# Patient Record
Sex: FEMALE | Race: WHITE | NOT HISPANIC OR LATINO | Employment: OTHER | URBAN - METROPOLITAN AREA
[De-identification: names, ages, dates, MRNs, and addresses within clinical notes are randomized per-mention and may not be internally consistent; named-entity substitution may affect disease eponyms.]

---

## 2017-04-20 ENCOUNTER — HISTORICAL (OUTPATIENT)
Dept: RADIOLOGY | Facility: HOSPITAL | Age: 82
End: 2017-04-20

## 2017-08-10 LAB
ABS NEUT (OLG): 2.67 X10(3)/MCL (ref 2.1–9.2)
ALBUMIN SERPL-MCNC: 4.3 GM/DL (ref 3.4–5)
ALBUMIN/GLOB SERPL: 1.5 {RATIO}
ALP SERPL-CCNC: 92 UNIT/L (ref 38–126)
ALT SERPL-CCNC: 21 UNIT/L (ref 12–78)
AST SERPL-CCNC: 16 UNIT/L (ref 15–37)
BASOPHILS # BLD AUTO: 0 X10(3)/MCL (ref 0–0.2)
BASOPHILS NFR BLD AUTO: 0 %
BILIRUB SERPL-MCNC: 0.5 MG/DL (ref 0.2–1)
BILIRUBIN DIRECT+TOT PNL SERPL-MCNC: 0.1 MG/DL (ref 0–0.2)
BILIRUBIN DIRECT+TOT PNL SERPL-MCNC: 0.4 MG/DL (ref 0–0.8)
BUN SERPL-MCNC: 24 MG/DL (ref 7–18)
CALCIUM SERPL-MCNC: 9.1 MG/DL (ref 8.5–10.1)
CHLORIDE SERPL-SCNC: 103 MMOL/L (ref 98–107)
CHOLEST SERPL-MCNC: 173 MG/DL (ref 0–200)
CHOLEST/HDLC SERPL: 5.4 {RATIO} (ref 0–4)
CO2 SERPL-SCNC: 29 MMOL/L (ref 21–32)
CREAT SERPL-MCNC: 1.02 MG/DL (ref 0.55–1.02)
DEPRECATED CALCIDIOL+CALCIFEROL SERPL-MC: 28.86 NG/ML (ref 30–80)
EOSINOPHIL # BLD AUTO: 0.2 X10(3)/MCL (ref 0–0.9)
EOSINOPHIL NFR BLD AUTO: 3 %
ERYTHROCYTE [DISTWIDTH] IN BLOOD BY AUTOMATED COUNT: 14.3 % (ref 11.5–17)
GLOBULIN SER-MCNC: 2.9 GM/DL (ref 2.4–3.5)
GLUCOSE SERPL-MCNC: 89 MG/DL (ref 74–106)
HCT VFR BLD AUTO: 41 % (ref 37–47)
HDLC SERPL-MCNC: 32 MG/DL (ref 35–60)
HGB BLD-MCNC: 12.8 GM/DL (ref 12–16)
LDLC SERPL CALC-MCNC: 20 MG/DL (ref 0–129)
LYMPHOCYTES # BLD AUTO: 2.6 X10(3)/MCL (ref 0.6–4.6)
LYMPHOCYTES NFR BLD AUTO: 44 %
MCH RBC QN AUTO: 31.4 PG (ref 27–31)
MCHC RBC AUTO-ENTMCNC: 31.2 GM/DL (ref 33–36)
MCV RBC AUTO: 100.5 FL (ref 80–94)
MONOCYTES # BLD AUTO: 0.5 X10(3)/MCL (ref 0.1–1.3)
MONOCYTES NFR BLD AUTO: 8 %
NEUTROPHILS # BLD AUTO: 2.67 X10(3)/MCL (ref 2.1–9.2)
NEUTROPHILS NFR BLD AUTO: 45 %
PLATELET # BLD AUTO: 182 X10(3)/MCL (ref 130–400)
PMV BLD AUTO: 12.8 FL (ref 9.4–12.4)
POTASSIUM SERPL-SCNC: 4.7 MMOL/L (ref 3.5–5.1)
PROT SERPL-MCNC: 7.2 GM/DL (ref 6.4–8.2)
RBC # BLD AUTO: 4.08 X10(6)/MCL (ref 4.2–5.4)
SODIUM SERPL-SCNC: 141 MMOL/L (ref 136–145)
TRIGL SERPL-MCNC: 607 MG/DL (ref 30–150)
TSH SERPL-ACNC: 0.88 MIU/ML (ref 0.36–3.74)
VLDLC SERPL CALC-MCNC: 121 MG/DL
WBC # SPEC AUTO: 6 X10(3)/MCL (ref 4.5–11.5)

## 2017-08-11 ENCOUNTER — HISTORICAL (OUTPATIENT)
Dept: LAB | Facility: HOSPITAL | Age: 82
End: 2017-08-11

## 2017-10-06 ENCOUNTER — HOSPITAL ENCOUNTER (OUTPATIENT)
Dept: ADMINISTRATIVE | Facility: HOSPITAL | Age: 82
End: 2017-10-07
Attending: INTERNAL MEDICINE | Admitting: INTERNAL MEDICINE

## 2017-10-06 LAB
ABS NEUT (OLG): 3.03 X10(3)/MCL (ref 2.1–9.2)
ALBUMIN SERPL-MCNC: 4.1 GM/DL (ref 3.4–5)
ALBUMIN/GLOB SERPL: 1 {RATIO}
ALP SERPL-CCNC: 89 UNIT/L (ref 45–117)
ALT SERPL-CCNC: 17 UNIT/L (ref 13–56)
APTT PPP: 21 SECOND(S) (ref 21–30)
AST SERPL-CCNC: 15 UNIT/L (ref 15–37)
BASOPHILS # BLD AUTO: 0.01 X10(3)/MCL (ref 0–0.2)
BASOPHILS NFR BLD AUTO: 0.2 % (ref 0–1)
BILIRUB SERPL-MCNC: 0.6 MG/DL (ref 0.2–1)
BILIRUBIN DIRECT+TOT PNL SERPL-MCNC: 0.2 MG/DL (ref 0–0.2)
BILIRUBIN DIRECT+TOT PNL SERPL-MCNC: 0.4 MG/DL (ref 0–1)
BNP BLD-MCNC: 45 PG/ML (ref 0–125)
BUN SERPL-MCNC: 26 MG/DL (ref 7–18)
CALCIUM SERPL-MCNC: 8.8 MG/DL (ref 8.5–10.1)
CHLORIDE SERPL-SCNC: 107 MMOL/L (ref 98–107)
CO2 SERPL-SCNC: 28 MMOL/L (ref 21–32)
CREAT SERPL-MCNC: 0.96 MG/DL (ref 0.55–1.02)
EOSINOPHIL # BLD AUTO: 0.15 X10(3)/MCL (ref 0–0.9)
EOSINOPHIL NFR BLD AUTO: 2.3 % (ref 0–6.4)
ERYTHROCYTE [DISTWIDTH] IN BLOOD BY AUTOMATED COUNT: 13.4 % (ref 11.5–17)
GLOBULIN SER-MCNC: 3 GM/DL (ref 2–4)
GLUCOSE SERPL-MCNC: 132 MG/DL (ref 74–106)
HCT VFR BLD AUTO: 34.5 % (ref 37–47)
HGB BLD-MCNC: 11.6 GM/DL (ref 12–16)
IMM GRANULOCYTES # BLD AUTO: 0.02 10*3/UL (ref 0–0.02)
IMM GRANULOCYTES NFR BLD AUTO: 0.3 % (ref 0–0.43)
INR PPP: 0.9
LYMPHOCYTES # BLD AUTO: 2.88 X10(3)/MCL (ref 0.6–4.6)
LYMPHOCYTES NFR BLD AUTO: 43.4 % (ref 16–44)
MCH RBC QN AUTO: 31.9 PG (ref 27–31)
MCHC RBC AUTO-ENTMCNC: 33.6 GM/DL (ref 33–36)
MCV RBC AUTO: 94.8 FL (ref 80–94)
MONOCYTES # BLD AUTO: 0.54 X10(3)/MCL (ref 0.1–1.3)
MONOCYTES NFR BLD AUTO: 8.1 % (ref 4–12.1)
NEUTROPHILS # BLD AUTO: 3.03 X10(3)/MCL (ref 2.1–9.2)
NEUTROPHILS NFR BLD AUTO: 45.7 % (ref 43–73)
NRBC BLD AUTO-RTO: 0 % (ref 0–0.2)
PLATELET # BLD AUTO: 192 X10(3)/MCL (ref 130–400)
PMV BLD AUTO: 12.1 FL (ref 7.4–10.4)
POTASSIUM SERPL-SCNC: 4 MMOL/L (ref 3.5–5.1)
PROT SERPL-MCNC: 7.4 GM/DL (ref 6.4–8.2)
PROTHROMBIN TIME: 10.3 SECOND(S) (ref 9.8–12)
RBC # BLD AUTO: 3.64 X10(6)/MCL (ref 4.2–5.4)
SODIUM SERPL-SCNC: 143 MMOL/L (ref 136–145)
TROPONIN I SERPL-MCNC: <0.015 NG/ML (ref 0–0.04)
TSH SERPL-ACNC: 1.29 MIU/ML (ref 0.36–3.74)
WBC # SPEC AUTO: 6.6 X10(3)/MCL (ref 4.5–11.5)

## 2017-10-07 LAB — TROPONIN I SERPL-MCNC: <0.015 NG/ML (ref 0–0.04)

## 2017-11-28 ENCOUNTER — HISTORICAL (OUTPATIENT)
Dept: ADMINISTRATIVE | Facility: HOSPITAL | Age: 82
End: 2017-11-28

## 2017-11-29 LAB
ABS NEUT (OLG): 3.39 X10(3)/MCL (ref 2.1–9.2)
ALBUMIN SERPL-MCNC: 3.1 GM/DL (ref 3.4–5)
ALBUMIN/GLOB SERPL: 1 {RATIO}
ALP SERPL-CCNC: 74 UNIT/L (ref 45–117)
ALT SERPL-CCNC: 22 UNIT/L (ref 13–56)
AST SERPL-CCNC: 28 UNIT/L (ref 15–37)
BASOPHILS # BLD AUTO: 0.01 X10(3)/MCL (ref 0–0.2)
BASOPHILS NFR BLD AUTO: 0.2 % (ref 0–1)
BILIRUB SERPL-MCNC: 0.4 MG/DL (ref 0.2–1)
BILIRUBIN DIRECT+TOT PNL SERPL-MCNC: 0.1 MG/DL (ref 0–0.2)
BILIRUBIN DIRECT+TOT PNL SERPL-MCNC: 0.3 MG/DL (ref 0–1)
BUN SERPL-MCNC: 17 MG/DL (ref 7–18)
CALCIUM SERPL-MCNC: 7.9 MG/DL (ref 8.5–10.1)
CHLORIDE SERPL-SCNC: 108 MMOL/L (ref 98–107)
CO2 SERPL-SCNC: 26 MMOL/L (ref 21–32)
CREAT SERPL-MCNC: 0.88 MG/DL (ref 0.55–1.02)
EOSINOPHIL # BLD AUTO: 0.14 X10(3)/MCL (ref 0–0.9)
EOSINOPHIL NFR BLD AUTO: 2.5 % (ref 0–6.4)
ERYTHROCYTE [DISTWIDTH] IN BLOOD BY AUTOMATED COUNT: 13.8 % (ref 11.5–17)
GLOBULIN SER-MCNC: 3 GM/DL (ref 2–4)
GLUCOSE SERPL-MCNC: 85 MG/DL (ref 74–106)
HCT VFR BLD AUTO: 28.7 % (ref 37–47)
HGB BLD-MCNC: 9.7 GM/DL (ref 12–16)
IMM GRANULOCYTES # BLD AUTO: 0.02 10*3/UL (ref 0–0.02)
IMM GRANULOCYTES NFR BLD AUTO: 0.4 % (ref 0–0.43)
LYMPHOCYTES # BLD AUTO: 1.57 X10(3)/MCL (ref 0.6–4.6)
LYMPHOCYTES NFR BLD AUTO: 27.5 % (ref 16–44)
MCH RBC QN AUTO: 32.8 PG (ref 27–31)
MCHC RBC AUTO-ENTMCNC: 33.8 GM/DL (ref 33–36)
MCV RBC AUTO: 97 FL (ref 80–94)
MONOCYTES # BLD AUTO: 0.57 X10(3)/MCL (ref 0.1–1.3)
MONOCYTES NFR BLD AUTO: 10 % (ref 4–12.1)
NEUTROPHILS # BLD AUTO: 3.39 X10(3)/MCL (ref 2.1–9.2)
NEUTROPHILS NFR BLD AUTO: 59.4 % (ref 43–73)
NRBC BLD AUTO-RTO: 0 % (ref 0–0.2)
PLATELET # BLD AUTO: 155 X10(3)/MCL (ref 130–400)
PMV BLD AUTO: 12.5 FL (ref 7.4–10.4)
POTASSIUM SERPL-SCNC: 3.9 MMOL/L (ref 3.5–5.1)
PROT SERPL-MCNC: 6.2 GM/DL (ref 6.4–8.2)
RBC # BLD AUTO: 2.96 X10(6)/MCL (ref 4.2–5.4)
SODIUM SERPL-SCNC: 143 MMOL/L (ref 136–145)
WBC # SPEC AUTO: 5.7 X10(3)/MCL (ref 4.5–11.5)

## 2018-06-18 ENCOUNTER — HISTORICAL (OUTPATIENT)
Dept: LAB | Facility: HOSPITAL | Age: 83
End: 2018-06-18

## 2018-06-18 LAB
ABS NEUT (OLG): 2.25 X10(3)/MCL (ref 2.1–9.2)
ALBUMIN SERPL-MCNC: 3.7 GM/DL (ref 3.4–5)
ALBUMIN/GLOB SERPL: 1.3 {RATIO}
ALP SERPL-CCNC: 91 UNIT/L (ref 38–126)
ALT SERPL-CCNC: 22 UNIT/L (ref 12–78)
AST SERPL-CCNC: 10 UNIT/L (ref 15–37)
BASOPHILS # BLD AUTO: 0 X10(3)/MCL (ref 0–0.2)
BASOPHILS NFR BLD AUTO: 0 %
BILIRUB SERPL-MCNC: 0.5 MG/DL (ref 0.2–1)
BILIRUBIN DIRECT+TOT PNL SERPL-MCNC: 0.1 MG/DL (ref 0–0.2)
BILIRUBIN DIRECT+TOT PNL SERPL-MCNC: 0.4 MG/DL (ref 0–0.8)
BUN SERPL-MCNC: 20 MG/DL (ref 7–18)
CALCIUM SERPL-MCNC: 8.4 MG/DL (ref 8.5–10.1)
CHLORIDE SERPL-SCNC: 109 MMOL/L (ref 98–107)
CHOLEST SERPL-MCNC: 148 MG/DL (ref 0–200)
CHOLEST/HDLC SERPL: 3.3 {RATIO} (ref 0–4)
CO2 SERPL-SCNC: 26 MMOL/L (ref 21–32)
CREAT SERPL-MCNC: 0.95 MG/DL (ref 0.55–1.02)
EOSINOPHIL # BLD AUTO: 0.1 X10(3)/MCL (ref 0–0.9)
EOSINOPHIL NFR BLD AUTO: 2 %
ERYTHROCYTE [DISTWIDTH] IN BLOOD BY AUTOMATED COUNT: 13.2 % (ref 11.5–17)
GLOBULIN SER-MCNC: 2.8 GM/DL (ref 2.4–3.5)
GLUCOSE SERPL-MCNC: 143 MG/DL (ref 74–106)
HCT VFR BLD AUTO: 36.9 % (ref 37–47)
HDLC SERPL-MCNC: 45 MG/DL (ref 35–60)
HGB BLD-MCNC: 11.9 GM/DL (ref 12–16)
LDLC SERPL CALC-MCNC: 36 MG/DL (ref 0–129)
LYMPHOCYTES # BLD AUTO: 1.3 X10(3)/MCL (ref 0.6–4.6)
LYMPHOCYTES NFR BLD AUTO: 32 %
MCH RBC QN AUTO: 33.3 PG (ref 27–31)
MCHC RBC AUTO-ENTMCNC: 32.2 GM/DL (ref 33–36)
MCV RBC AUTO: 103.4 FL (ref 80–94)
MONOCYTES # BLD AUTO: 0.4 X10(3)/MCL (ref 0.1–1.3)
MONOCYTES NFR BLD AUTO: 9 %
NEUTROPHILS # BLD AUTO: 2.25 X10(3)/MCL (ref 1.4–7.9)
NEUTROPHILS NFR BLD AUTO: 56 %
PLATELET # BLD AUTO: 152 X10(3)/MCL (ref 130–400)
PMV BLD AUTO: 12.8 FL (ref 9.4–12.4)
POTASSIUM SERPL-SCNC: 4.9 MMOL/L (ref 3.5–5.1)
PROT SERPL-MCNC: 6.5 GM/DL (ref 6.4–8.2)
RBC # BLD AUTO: 3.57 X10(6)/MCL (ref 4.2–5.4)
SODIUM SERPL-SCNC: 145 MMOL/L (ref 136–145)
TRIGL SERPL-MCNC: 336 MG/DL (ref 30–150)
TSH SERPL-ACNC: 0.9 MIU/L (ref 0.36–3.74)
VLDLC SERPL CALC-MCNC: 67 MG/DL
WBC # SPEC AUTO: 4 X10(3)/MCL (ref 4.5–11.5)

## 2019-01-03 ENCOUNTER — HISTORICAL (OUTPATIENT)
Dept: ADMINISTRATIVE | Facility: HOSPITAL | Age: 84
End: 2019-01-03

## 2019-03-27 ENCOUNTER — HISTORICAL (OUTPATIENT)
Dept: RADIOLOGY | Facility: HOSPITAL | Age: 84
End: 2019-03-27

## 2019-03-29 ENCOUNTER — HISTORICAL (OUTPATIENT)
Dept: RADIOLOGY | Facility: HOSPITAL | Age: 84
End: 2019-03-29

## 2019-04-15 ENCOUNTER — HISTORICAL (OUTPATIENT)
Dept: LAB | Facility: HOSPITAL | Age: 84
End: 2019-04-15

## 2019-04-15 LAB
ABS NEUT (OLG): 1.13 X10(3)/MCL (ref 2.1–9.2)
ALBUMIN SERPL-MCNC: 4 GM/DL (ref 3.4–5)
ALBUMIN/GLOB SERPL: 1.5 RATIO (ref 1.1–2)
ALP SERPL-CCNC: 78 UNIT/L (ref 38–126)
ALT SERPL-CCNC: 19 UNIT/L (ref 12–78)
ANISOCYTOSIS BLD QL SMEAR: 1
APPEARANCE, UA: CLEAR
AST SERPL-CCNC: 14 UNIT/L (ref 15–37)
BACTERIA SPEC CULT: ABNORMAL /HPF
BASOPHILS # BLD AUTO: 0 X10(3)/MCL (ref 0–0.2)
BASOPHILS NFR BLD AUTO: 0 %
BILIRUB SERPL-MCNC: 0.4 MG/DL (ref 0.2–1)
BILIRUB UR QL STRIP: NEGATIVE
BILIRUBIN DIRECT+TOT PNL SERPL-MCNC: 0.1 MG/DL (ref 0–0.5)
BILIRUBIN DIRECT+TOT PNL SERPL-MCNC: 0.3 MG/DL (ref 0–0.8)
BUN SERPL-MCNC: 25 MG/DL (ref 7–18)
CALCIUM SERPL-MCNC: 8.9 MG/DL (ref 8.5–10.1)
CHLORIDE SERPL-SCNC: 105 MMOL/L (ref 98–107)
CO2 SERPL-SCNC: 30 MMOL/L (ref 21–32)
COLOR UR: YELLOW
CREAT SERPL-MCNC: 0.85 MG/DL (ref 0.55–1.02)
EOSINOPHIL # BLD AUTO: 0.1 X10(3)/MCL (ref 0–0.9)
EOSINOPHIL NFR BLD AUTO: 3 %
ERYTHROCYTE [DISTWIDTH] IN BLOOD BY AUTOMATED COUNT: 13.7 % (ref 11.5–17)
GLOBULIN SER-MCNC: 2.6 GM/DL (ref 2.4–3.5)
GLUCOSE (UA): NEGATIVE
GLUCOSE SERPL-MCNC: 87 MG/DL (ref 74–106)
HCT VFR BLD AUTO: 33.6 % (ref 37–47)
HGB BLD-MCNC: 10.7 GM/DL (ref 12–16)
HGB UR QL STRIP: NEGATIVE
KETONES UR QL STRIP: NEGATIVE
LEUKOCYTE ESTERASE UR QL STRIP: ABNORMAL
LYMPHOCYTES # BLD AUTO: 2 X10(3)/MCL (ref 0.6–4.6)
LYMPHOCYTES NFR BLD AUTO: 55 % (ref 13–40)
MACROCYTES BLD QL SMEAR: 1 /MCL
MCH RBC QN AUTO: 33.6 PG (ref 27–31)
MCHC RBC AUTO-ENTMCNC: 31.8 GM/DL (ref 33–36)
MCV RBC AUTO: 105.7 FL (ref 80–94)
MONOCYTES # BLD AUTO: 0.4 X10(3)/MCL (ref 0.1–1.3)
MONOCYTES NFR BLD AUTO: 10 %
NEUTROPHILS # BLD AUTO: 1.13 X10(3)/MCL (ref 2.1–9.2)
NEUTROPHILS NFR BLD AUTO: 30 %
NITRITE UR QL STRIP: NEGATIVE
OVALOCYTES BLD QL SMEAR: 1
PH UR STRIP: 5 [PH] (ref 5–9)
PLATELET # BLD AUTO: 187 X10(3)/MCL (ref 130–400)
PLATELET # BLD EST: ABNORMAL 10*3/UL
PMV BLD AUTO: 12.5 FL (ref 7.4–10.4)
POIKILOCYTOSIS BLD QL SMEAR: 1
POTASSIUM SERPL-SCNC: 4.2 MMOL/L (ref 3.5–5.1)
PROT SERPL-MCNC: 6.6 GM/DL (ref 6.4–8.2)
PROT UR QL STRIP: NEGATIVE
RBC # BLD AUTO: 3.18 X10(6)/MCL (ref 4.2–5.4)
RBC #/AREA URNS HPF: ABNORMAL /[HPF]
SODIUM SERPL-SCNC: 139 MMOL/L (ref 136–145)
SP GR UR STRIP: 1.01 (ref 1–1.03)
SQUAMOUS EPITHELIAL, UA: ABNORMAL
TSH SERPL-ACNC: 0.41 MIU/L (ref 0.36–3.74)
UROBILINOGEN UR STRIP-ACNC: 0.2
WBC # SPEC AUTO: 3.7 X10(3)/MCL (ref 4.5–11.5)
WBC #/AREA URNS HPF: ABNORMAL /[HPF]

## 2019-04-23 ENCOUNTER — HISTORICAL (OUTPATIENT)
Dept: RADIOLOGY | Facility: HOSPITAL | Age: 84
End: 2019-04-23

## 2019-04-26 ENCOUNTER — HISTORICAL (OUTPATIENT)
Dept: LAB | Facility: HOSPITAL | Age: 84
End: 2019-04-26

## 2019-04-26 LAB
ABS NEUT (OLG): 1.29 X10(3)/MCL (ref 2.1–9.2)
BASOPHILS # BLD AUTO: 0 X10(3)/MCL (ref 0–0.2)
BASOPHILS NFR BLD AUTO: 0 %
EOSINOPHIL # BLD AUTO: 0.1 X10(3)/MCL (ref 0–0.9)
EOSINOPHIL NFR BLD AUTO: 2 %
ERYTHROCYTE [DISTWIDTH] IN BLOOD BY AUTOMATED COUNT: 13.6 % (ref 11.5–17)
HCT VFR BLD AUTO: 34.9 % (ref 37–47)
HGB BLD-MCNC: 11.1 GM/DL (ref 12–16)
LYMPHOCYTES # BLD AUTO: 1.6 X10(3)/MCL (ref 0.6–4.6)
LYMPHOCYTES NFR BLD AUTO: 49 %
MCH RBC QN AUTO: 34.2 PG (ref 27–31)
MCHC RBC AUTO-ENTMCNC: 31.8 GM/DL (ref 33–36)
MCV RBC AUTO: 107.4 FL (ref 80–94)
MONOCYTES # BLD AUTO: 0.3 X10(3)/MCL (ref 0.1–1.3)
MONOCYTES NFR BLD AUTO: 9 %
NEUTROPHILS # BLD AUTO: 1.29 X10(3)/MCL (ref 2.1–9.2)
NEUTROPHILS NFR BLD AUTO: 40 %
PLATELET # BLD AUTO: 201 X10(3)/MCL (ref 130–400)
PMV BLD AUTO: 12.9 FL (ref 9.4–12.4)
RBC # BLD AUTO: 3.25 X10(6)/MCL (ref 4.2–5.4)
VIT B12 SERPL-MCNC: 476 PG/ML (ref 193–986)
WBC # SPEC AUTO: 3.2 X10(3)/MCL (ref 4.5–11.5)

## 2019-07-01 ENCOUNTER — HISTORICAL (OUTPATIENT)
Dept: LAB | Facility: HOSPITAL | Age: 84
End: 2019-07-01

## 2019-07-01 LAB
ABS NEUT (OLG): 2.06 X10(3)/MCL (ref 2.1–9.2)
ALBUMIN SERPL-MCNC: 3.9 GM/DL (ref 3.4–5)
ALBUMIN/GLOB SERPL: 1.3 RATIO (ref 1–2)
ALP SERPL-CCNC: 105 UNIT/L (ref 45–117)
ALT SERPL-CCNC: 19 UNIT/L (ref 13–56)
AST SERPL-CCNC: 16 UNIT/L (ref 15–37)
BASOPHILS # BLD AUTO: 0.02 X10(3)/MCL (ref 0–0.2)
BASOPHILS NFR BLD AUTO: 0.5 % (ref 0–1)
BILIRUB SERPL-MCNC: 0.4 MG/DL (ref 0.2–1)
BILIRUBIN DIRECT+TOT PNL SERPL-MCNC: <0.1 MG/DL (ref 0–0.2)
BILIRUBIN DIRECT+TOT PNL SERPL-MCNC: >0.3 MG/DL (ref 0–1)
BUN SERPL-MCNC: 22 MG/DL (ref 7–18)
CALCIUM SERPL-MCNC: 8.6 MG/DL (ref 8.5–10.1)
CHLORIDE SERPL-SCNC: 111 MMOL/L (ref 98–107)
CHOLEST SERPL-MCNC: 141 MG/DL (ref 0–199)
CHOLEST/HDLC SERPL: 3 MG/DL (ref 0–8)
CO2 SERPL-SCNC: 27 MMOL/L (ref 21–32)
CREAT SERPL-MCNC: 0.83 MG/DL (ref 0.55–1.02)
DEPRECATED CALCIDIOL+CALCIFEROL SERPL-MC: 57.53 NG/ML (ref 30–80)
EOSINOPHIL # BLD AUTO: 0.14 X10(3)/MCL (ref 0–0.9)
EOSINOPHIL NFR BLD AUTO: 3.4 % (ref 0–6.4)
ERYTHROCYTE [DISTWIDTH] IN BLOOD BY AUTOMATED COUNT: 13.1 % (ref 11.5–17)
EST. AVERAGE GLUCOSE BLD GHB EST-MCNC: 111 MG/DL
GLOBULIN SER-MCNC: 3 GM/DL (ref 2–4)
GLUCOSE SERPL-MCNC: 85 MG/DL (ref 74–106)
HBA1C MFR BLD: 5.5 % (ref 4.2–6.3)
HCT VFR BLD AUTO: 34.1 % (ref 37–47)
HDLC SERPL-MCNC: 47 MG/DL
HGB BLD-MCNC: 11.4 GM/DL (ref 12–16)
IMM GRANULOCYTES # BLD AUTO: 0.01 10*3/UL (ref 0–0.02)
IMM GRANULOCYTES NFR BLD AUTO: 0.2 % (ref 0–0.43)
LDLC SERPL CALC-MCNC: 46 MG/DL (ref 0–129)
LYMPHOCYTES # BLD AUTO: 1.55 X10(3)/MCL (ref 0.6–4.6)
LYMPHOCYTES NFR BLD AUTO: 37.5 % (ref 16–44)
MCH RBC QN AUTO: 34.4 PG (ref 27–31)
MCHC RBC AUTO-ENTMCNC: 33.4 GM/DL (ref 33–36)
MCV RBC AUTO: 103 FL (ref 80–94)
MONOCYTES # BLD AUTO: 0.35 X10(3)/MCL (ref 0.1–1.3)
MONOCYTES NFR BLD AUTO: 8.5 % (ref 4–12.1)
NEUTROPHILS # BLD AUTO: 2.06 X10(3)/MCL (ref 2.1–9.2)
NEUTROPHILS NFR BLD AUTO: 49.9 % (ref 43–73)
NRBC BLD AUTO-RTO: 0 % (ref 0–0.2)
PLATELET # BLD AUTO: 208 X10(3)/MCL (ref 130–400)
PMV BLD AUTO: 12.2 FL (ref 7.4–10.4)
POTASSIUM SERPL-SCNC: 4.1 MMOL/L (ref 3.5–5.1)
PROT SERPL-MCNC: 7.2 GM/DL (ref 6.4–8.2)
RBC # BLD AUTO: 3.31 X10(6)/MCL (ref 4.2–5.4)
SODIUM SERPL-SCNC: 142 MMOL/L (ref 136–145)
T3FREE SERPL-MCNC: 2.78 PG/ML (ref 2.18–3.98)
T4 FREE SERPL-MCNC: 1.08 NG/DL (ref 0.76–1.46)
TRIGL SERPL-MCNC: 238 MG/DL (ref 0–149)
TSH SERPL-ACNC: 0.74 MIU/ML (ref 0.36–3.74)
VIT B12 SERPL-MCNC: 963 PG/ML (ref 193–986)
VLDLC SERPL CALC-MCNC: 48 MG/DL
WBC # SPEC AUTO: 4.1 X10(3)/MCL (ref 4.5–11.5)

## 2020-07-02 ENCOUNTER — HISTORICAL (OUTPATIENT)
Dept: LAB | Facility: HOSPITAL | Age: 85
End: 2020-07-02

## 2020-07-02 LAB
ABS NEUT (OLG): 1.75 X10(3)/MCL (ref 2.1–9.2)
ALBUMIN SERPL-MCNC: 4 GM/DL (ref 3.4–4.8)
ALBUMIN/GLOB SERPL: 1.4 RATIO (ref 1.1–2)
ALP SERPL-CCNC: 80 UNIT/L (ref 40–150)
ALT SERPL-CCNC: 13 UNIT/L (ref 0–55)
APPEARANCE, UA: CLEAR
AST SERPL-CCNC: 15 UNIT/L (ref 5–34)
BASOPHILS # BLD AUTO: 0.02 X10(3)/MCL (ref 0–0.2)
BASOPHILS NFR BLD AUTO: 0.5 % (ref 0–1)
BILIRUB SERPL-MCNC: 0.4 MG/DL (ref 0.2–1.2)
BILIRUB UR QL STRIP: NEGATIVE
BILIRUBIN DIRECT+TOT PNL SERPL-MCNC: 0.2 MG/DL (ref 0–0.5)
BILIRUBIN DIRECT+TOT PNL SERPL-MCNC: 0.2 MG/DL (ref 0–0.8)
BUN SERPL-MCNC: 17.4 MG/DL (ref 9.8–20.1)
CALCIUM SERPL-MCNC: 9.3 MG/DL (ref 8.4–10.2)
CHLORIDE SERPL-SCNC: 111 MMOL/L (ref 98–111)
CHOLEST SERPL-MCNC: 140 MG/DL
CHOLEST/HDLC SERPL: 3 {RATIO} (ref 0–5)
CO2 SERPL-SCNC: 29 MMOL/L (ref 23–31)
COLOR UR: NORMAL
CREAT SERPL-MCNC: 0.82 MG/DL (ref 0.57–1.11)
DEPRECATED CALCIDIOL+CALCIFEROL SERPL-MC: 69 NG/ML (ref 6.6–49.9)
EOSINOPHIL # BLD AUTO: 0.17 X10(3)/MCL (ref 0–0.9)
EOSINOPHIL NFR BLD AUTO: 4.5 % (ref 0–6.4)
ERYTHROCYTE [DISTWIDTH] IN BLOOD BY AUTOMATED COUNT: 14.2 % (ref 11.5–17)
GLOBULIN SER-MCNC: 2.9 GM/DL (ref 2.4–3.5)
GLUCOSE (UA): NEGATIVE
GLUCOSE SERPL-MCNC: 95 MG/DL (ref 75–121)
HCT VFR BLD AUTO: 31.1 % (ref 37–47)
HDLC SERPL-MCNC: 47 MG/DL (ref 40–60)
HGB BLD-MCNC: 10.1 GM/DL (ref 12–16)
HGB UR QL STRIP: NEGATIVE
IMM GRANULOCYTES # BLD AUTO: 0.01 10*3/UL (ref 0–0.02)
IMM GRANULOCYTES NFR BLD AUTO: 0.3 % (ref 0–0.43)
KETONES UR QL STRIP: NEGATIVE
LDLC SERPL CALC-MCNC: 66 MG/DL (ref 50–140)
LEUKOCYTE ESTERASE UR QL STRIP: NEGATIVE
LYMPHOCYTES # BLD AUTO: 1.52 X10(3)/MCL (ref 0.6–4.6)
LYMPHOCYTES NFR BLD AUTO: 40.2 % (ref 16–44)
MACROCYTES BLD QL SMEAR: ABNORMAL
MCH RBC QN AUTO: 34.6 PG (ref 27–31)
MCHC RBC AUTO-ENTMCNC: 32.5 GM/DL (ref 33–36)
MCV RBC AUTO: 106.5 FL (ref 80–94)
MONOCYTES # BLD AUTO: 0.31 X10(3)/MCL (ref 0.1–1.3)
MONOCYTES NFR BLD AUTO: 8.2 % (ref 4–12.1)
NEUTROPHILS # BLD AUTO: 1.75 X10(3)/MCL (ref 2.1–9.2)
NEUTROPHILS NFR BLD AUTO: 46.3 % (ref 43–73)
NITRITE UR QL STRIP: NEGATIVE
NRBC BLD AUTO-RTO: 0 % (ref 0–0.2)
PH UR STRIP: 6 [PH] (ref 5–7)
PLATELET # BLD AUTO: 248 X10(3)/MCL (ref 130–400)
PLATELET # BLD EST: ADEQUATE 10*3/UL
PMV BLD AUTO: 12.3 FL (ref 7.4–10.4)
POTASSIUM SERPL-SCNC: 4.9 MMOL/L (ref 3.5–5.1)
PROT SERPL-MCNC: 6.9 GM/DL (ref 5.8–7.6)
PROT UR QL STRIP: NEGATIVE
RBC # BLD AUTO: 2.92 X10(6)/MCL (ref 4.2–5.4)
RBC MORPH BLD: ABNORMAL
SODIUM SERPL-SCNC: 146 MMOL/L (ref 132–146)
SP GR UR STRIP: 1.01 (ref 1–1.03)
T3FREE SERPL-MCNC: 2.57 PG/ML (ref 1.71–3.71)
T4 FREE SERPL-MCNC: 1.01 NG/DL (ref 0.7–1.48)
TRIGL SERPL-MCNC: 133 MG/DL (ref 0–150)
TSH SERPL-ACNC: 0.94 UIU/ML (ref 0.35–4.94)
UROBILINOGEN UR STRIP-ACNC: NEGATIVE
VIT B12 SERPL-MCNC: 1012 PG/ML (ref 213–816)
VLDLC SERPL CALC-MCNC: 27 MG/DL
WBC # SPEC AUTO: 3.8 X10(3)/MCL (ref 4.5–11.5)

## 2020-08-11 ENCOUNTER — HISTORICAL (OUTPATIENT)
Dept: ADMINISTRATIVE | Facility: HOSPITAL | Age: 85
End: 2020-08-11

## 2020-08-11 LAB
ABS NEUT (OLG): 1.37 X10(3)/MCL (ref 2.1–9.2)
ALBUMIN SERPL-MCNC: 3.9 GM/DL (ref 3.4–5)
ALBUMIN/GLOB SERPL: 1.4 RATIO (ref 1.1–2)
ALP SERPL-CCNC: 86 UNIT/L (ref 40–150)
ALT SERPL-CCNC: 10 UNIT/L (ref 0–55)
AST SERPL-CCNC: 13 UNIT/L (ref 5–34)
BASOPHILS # BLD AUTO: 0.1 X10(3)/MCL (ref 0–0.2)
BASOPHILS NFR BLD AUTO: 2.5 %
BILIRUB SERPL-MCNC: 0.3 MG/DL
BILIRUBIN DIRECT+TOT PNL SERPL-MCNC: 0.1 MG/DL (ref 0–0.5)
BILIRUBIN DIRECT+TOT PNL SERPL-MCNC: 0.2 MG/DL (ref 0–0.8)
BUN SERPL-MCNC: 25.7 MG/DL (ref 9.8–20.1)
CALCIUM SERPL-MCNC: 8.7 MG/DL (ref 8.4–10.2)
CHLORIDE SERPL-SCNC: 105 MMOL/L (ref 98–111)
CO2 SERPL-SCNC: 27 MMOL/L (ref 23–31)
CREAT SERPL-MCNC: 0.8 MG/DL (ref 0.55–1.02)
EOSINOPHIL # BLD AUTO: 0.1 X10(3)/MCL (ref 0–0.9)
EOSINOPHIL NFR BLD AUTO: 3.6 %
ERYTHROCYTE [DISTWIDTH] IN BLOOD BY AUTOMATED COUNT: 14.2 % (ref 11.5–17)
FOLATE SERPL-MCNC: 13.3 NG/ML (ref 7–31.4)
GLOBULIN SER-MCNC: 2.7 GM/DL (ref 2.4–3.5)
GLUCOSE SERPL-MCNC: 80 MG/DL (ref 75–121)
HCT VFR BLD AUTO: 30.1 % (ref 37–47)
HGB BLD-MCNC: 9.6 GM/DL (ref 12–16)
LYMPHOCYTES # BLD AUTO: 1.8 X10(3)/MCL (ref 0.6–4.6)
LYMPHOCYTES NFR BLD AUTO: 48.6 %
MCH RBC QN AUTO: 35 PG (ref 27–31)
MCHC RBC AUTO-ENTMCNC: 31.9 GM/DL (ref 33–36)
MCV RBC AUTO: 109.9 FL (ref 80–94)
MONOCYTES # BLD AUTO: 0.3 X10(3)/MCL (ref 0.1–1.3)
MONOCYTES NFR BLD AUTO: 7.4 %
NEUTROPHILS # BLD AUTO: 1.4 X10(3)/MCL (ref 2.1–9.2)
NEUTROPHILS NFR BLD AUTO: 37.4 %
PLATELET # BLD AUTO: 286 X10(3)/MCL (ref 130–400)
PMV BLD AUTO: 11.8 FL (ref 9.4–12.4)
POTASSIUM SERPL-SCNC: 4.2 MMOL/L (ref 3.5–5.1)
PROT SERPL-MCNC: 6.6 GM/DL (ref 5.8–7.6)
RBC # BLD AUTO: 2.74 X10(6)/MCL (ref 4.2–5.4)
SODIUM SERPL-SCNC: 140 MMOL/L (ref 132–146)
WBC # SPEC AUTO: 3.7 X10(3)/MCL (ref 4.5–11.5)

## 2020-08-18 ENCOUNTER — HISTORICAL (OUTPATIENT)
Dept: URGENT CARE | Facility: CLINIC | Age: 85
End: 2020-08-18

## 2020-08-18 LAB
BILIRUB SERPL-MCNC: NEGATIVE MG/DL
BLOOD URINE, POC: NEGATIVE
CLARITY, POC UA: CLEAR
COLOR, POC UA: NORMAL
GLUCOSE UR QL STRIP: NEGATIVE
KETONES UR QL STRIP: NEGATIVE
LEUKOCYTE EST, POC UA: NORMAL
NITRITE, POC UA: NEGATIVE
PH, POC UA: 5
PROTEIN, POC: NEGATIVE
SPECIFIC GRAVITY, POC UA: 1.01
UROBILINOGEN, POC UA: NORMAL

## 2020-08-24 ENCOUNTER — HISTORICAL (OUTPATIENT)
Dept: RADIOLOGY | Facility: HOSPITAL | Age: 85
End: 2020-08-24

## 2021-01-25 ENCOUNTER — HISTORICAL (OUTPATIENT)
Dept: ADMINISTRATIVE | Facility: HOSPITAL | Age: 86
End: 2021-01-25

## 2021-01-25 LAB
APPEARANCE, UA: CLEAR
BACTERIA SPEC CULT: NORMAL /HPF
BILIRUB UR QL STRIP: NEGATIVE
COLOR UR: YELLOW
GLUCOSE (UA): NEGATIVE
HGB UR QL STRIP: NEGATIVE
KETONES UR QL STRIP: NEGATIVE
LEUKOCYTE ESTERASE UR QL STRIP: NEGATIVE
NITRITE UR QL STRIP: NEGATIVE
PH UR STRIP: 7.5 [PH] (ref 5–9)
PROT UR QL STRIP: NEGATIVE
RBC #/AREA URNS HPF: NORMAL /[HPF]
SP GR UR STRIP: 1.01 (ref 1–1.03)
SQUAMOUS EPITHELIAL, UA: NORMAL
UA WBC MAN: NORMAL
UROBILINOGEN UR STRIP-ACNC: 0.2

## 2021-01-27 LAB — FINAL CULTURE: NORMAL

## 2021-05-21 LAB
BUN SERPL-MCNC: 24 MG/DL (ref 7–18)
CALCIUM SERPL-MCNC: 8.9 MG/DL (ref 8.5–10.1)
CHLORIDE SERPL-SCNC: 108 MMOL/L (ref 98–107)
CHOLEST SERPL-MCNC: 149 MG/DL
CO2 SERPL-SCNC: 26 MMOL/L (ref 21–32)
GLUCOSE SERPL-MCNC: 86 MG/DL (ref 74–106)
HDLC SERPL-MCNC: 42 MG/DL (ref 35–60)
LDLC SERPL CALC-MCNC: 70 MG/DL
POTASSIUM SERPL-SCNC: 4.7 MMOL/L (ref 3.5–5.1)
SODIUM SERPL-SCNC: 142 MEQ/L (ref 131–145)
TRIGL SERPL-MCNC: 216 MG/DL (ref 30–150)

## 2021-06-16 ENCOUNTER — HISTORICAL (OUTPATIENT)
Dept: RADIOLOGY | Facility: HOSPITAL | Age: 86
End: 2021-06-16

## 2021-08-06 ENCOUNTER — HISTORICAL (OUTPATIENT)
Dept: LAB | Facility: HOSPITAL | Age: 86
End: 2021-08-06

## 2021-08-06 LAB
ABS NEUT (OLG): 1 X10(3)/MCL (ref 2.1–9.2)
ALBUMIN SERPL-MCNC: 4 GM/DL (ref 3.4–4.8)
ALBUMIN/GLOB SERPL: 1.4 RATIO (ref 1.1–2)
ALP SERPL-CCNC: 67 UNIT/L (ref 40–150)
ALT SERPL-CCNC: 11 UNIT/L (ref 0–55)
APPEARANCE, UA: CLEAR
AST SERPL-CCNC: 14 UNIT/L (ref 5–34)
BACTERIA SPEC CULT: NORMAL
BASOPHILS NFR BLD MANUAL: 0 % (ref 0–2)
BILIRUB SERPL-MCNC: 0.6 MG/DL (ref 0.2–1.2)
BILIRUB UR QL STRIP: NEGATIVE
BILIRUBIN DIRECT+TOT PNL SERPL-MCNC: 0.2 MG/DL (ref 0–0.5)
BILIRUBIN DIRECT+TOT PNL SERPL-MCNC: 0.4 MG/DL (ref 0–0.8)
BUN SERPL-MCNC: 15.7 MG/DL (ref 9.8–20.1)
CALCIUM SERPL-MCNC: 9.3 MG/DL (ref 8.4–10.2)
CHLORIDE SERPL-SCNC: 109 MMOL/L (ref 98–111)
CHOLEST SERPL-MCNC: 124 MG/DL
CHOLEST/HDLC SERPL: 3 {RATIO} (ref 0–5)
CO2 SERPL-SCNC: 26 MMOL/L (ref 23–31)
COLOR UR: ABNORMAL
CREAT SERPL-MCNC: 0.91 MG/DL (ref 0.57–1.11)
EOSINOPHIL NFR BLD MANUAL: 4 % (ref 0–8)
ERYTHROCYTE [DISTWIDTH] IN BLOOD BY AUTOMATED COUNT: 15 % (ref 11.5–17)
GLOBULIN SER-MCNC: 2.9 GM/DL (ref 2.4–3.5)
GLUCOSE (UA): NEGATIVE
GLUCOSE SERPL-MCNC: 91 MG/DL (ref 75–121)
HCT VFR BLD AUTO: 27.2 % (ref 37–47)
HDLC SERPL-MCNC: 41 MG/DL (ref 40–60)
HGB BLD-MCNC: 8.5 GM/DL (ref 12–16)
HGB UR QL STRIP: ABNORMAL
KETONES UR QL STRIP: NEGATIVE
LDLC SERPL CALC-MCNC: 55 MG/DL (ref 50–140)
LEUKOCYTE ESTERASE UR QL STRIP: NEGATIVE
LYMPHOCYTES NFR BLD MANUAL: 46 % (ref 13–40)
MCH RBC QN AUTO: 35.1 PG (ref 27–31)
MCHC RBC AUTO-ENTMCNC: 31.3 GM/DL (ref 33–36)
MCV RBC AUTO: 112.4 FL (ref 80–94)
MONOCYTES NFR BLD MANUAL: 4 % (ref 2–11)
NEUTROPHILS NFR BLD MANUAL: 46 % (ref 47–80)
NITRITE UR QL STRIP: NEGATIVE
PH UR STRIP: 6.5 [PH] (ref 5–7)
PLATELET # BLD AUTO: 282 X10(3)/MCL (ref 130–400)
PMV BLD AUTO: 12.5 FL (ref 7.4–10.4)
POTASSIUM SERPL-SCNC: 4.5 MMOL/L (ref 3.5–5.1)
PROT SERPL-MCNC: 6.9 GM/DL (ref 5.8–7.6)
PROT UR QL STRIP: NEGATIVE
RBC # BLD AUTO: 2.42 X10(6)/MCL (ref 4.2–5.4)
RBC #/AREA URNS HPF: 0 /[HPF]
SODIUM SERPL-SCNC: 144 MMOL/L (ref 132–146)
SP GR UR STRIP: 1.01 (ref 1–1.03)
SQUAMOUS EPITHELIAL, UA: NORMAL /LPF
TRIGL SERPL-MCNC: 142 MG/DL (ref 0–150)
TSH SERPL-ACNC: 1.44 UIU/ML (ref 0.35–4.94)
UROBILINOGEN UR STRIP-ACNC: NEGATIVE
VIT B12 SERPL-MCNC: 646 PG/ML (ref 213–816)
VLDLC SERPL CALC-MCNC: 28 MG/DL
WBC # SPEC AUTO: 3 X10(3)/MCL (ref 4.5–11.5)
WBC #/AREA URNS HPF: 0 /[HPF]

## 2021-08-23 ENCOUNTER — HISTORICAL (OUTPATIENT)
Dept: LAB | Facility: HOSPITAL | Age: 86
End: 2021-08-23

## 2021-08-23 LAB
ABS NEUT (OLG): 3.07 X10(3)/MCL (ref 2.1–9.2)
ERYTHROCYTE [DISTWIDTH] IN BLOOD BY AUTOMATED COUNT: 18.3 % (ref 11.5–17)
HCT VFR BLD AUTO: 32.7 % (ref 37–47)
HGB BLD-MCNC: 10.5 GM/DL (ref 12–16)
MCH RBC QN AUTO: 32.6 PG (ref 27–31)
MCHC RBC AUTO-ENTMCNC: 32.1 GM/DL (ref 33–36)
MCV RBC AUTO: 101.6 FL (ref 80–94)
NRBC BLD AUTO-RTO: 0 % (ref 0–0.2)
PLATELET # BLD AUTO: 217 X10(3)/MCL (ref 130–400)
PMV BLD AUTO: 13.1 FL (ref 7.4–10.4)
RBC # BLD AUTO: 3.22 X10(6)/MCL (ref 4.2–5.4)
WBC # SPEC AUTO: 5.7 X10(3)/MCL (ref 4.5–11.5)

## 2021-09-14 ENCOUNTER — HISTORICAL (OUTPATIENT)
Dept: ADMINISTRATIVE | Facility: HOSPITAL | Age: 86
End: 2021-09-14

## 2021-09-14 LAB
ABS NEUT (OLG): 1.35 X10(3)/MCL (ref 2.1–9.2)
ALBUMIN SERPL-MCNC: 4.1 GM/DL (ref 3.4–4.8)
ALBUMIN/GLOB SERPL: 1.6 RATIO (ref 1.1–2)
ALP SERPL-CCNC: 74 UNIT/L (ref 40–150)
ALT SERPL-CCNC: 13 UNIT/L (ref 0–55)
AST SERPL-CCNC: 15 UNIT/L (ref 5–34)
BASOPHILS # BLD AUTO: 0.1 X10(3)/MCL (ref 0–0.2)
BASOPHILS NFR BLD AUTO: 2.5 %
BILIRUB SERPL-MCNC: 0.5 MG/DL
BILIRUBIN DIRECT+TOT PNL SERPL-MCNC: 0.2 MG/DL (ref 0–0.5)
BILIRUBIN DIRECT+TOT PNL SERPL-MCNC: 0.3 MG/DL (ref 0–0.8)
BUN SERPL-MCNC: 20 MG/DL (ref 9.8–20.1)
CALCIUM SERPL-MCNC: 9.3 MG/DL (ref 8.4–10.2)
CHLORIDE SERPL-SCNC: 106 MMOL/L (ref 98–111)
CO2 SERPL-SCNC: 27 MMOL/L (ref 23–31)
CREAT SERPL-MCNC: 0.86 MG/DL (ref 0.55–1.02)
EOSINOPHIL # BLD AUTO: 0.1 X10(3)/MCL (ref 0–0.9)
EOSINOPHIL NFR BLD AUTO: 3.9 %
ERYTHROCYTE [DISTWIDTH] IN BLOOD BY AUTOMATED COUNT: 18.7 % (ref 11.5–17)
FERRITIN SERPL-MCNC: 339.89 NG/ML (ref 4.63–204)
GLOBULIN SER-MCNC: 2.5 GM/DL (ref 2.4–3.5)
GLUCOSE SERPL-MCNC: 95 MG/DL (ref 75–121)
HCT VFR BLD AUTO: 30.3 % (ref 37–47)
HGB BLD-MCNC: 9.6 GM/DL (ref 12–16)
IRON SATN MFR SERPL: 57 % (ref 20–50)
IRON SERPL-MCNC: 131 UG/DL (ref 50–170)
LYMPHOCYTES # BLD AUTO: 1.8 X10(3)/MCL (ref 0.6–4.6)
LYMPHOCYTES NFR BLD AUTO: 48.3 %
MCH RBC QN AUTO: 33.7 PG (ref 27–31)
MCHC RBC AUTO-ENTMCNC: 31.7 GM/DL (ref 33–36)
MCV RBC AUTO: 106.3 FL (ref 80–94)
MONOCYTES # BLD AUTO: 0.3 X10(3)/MCL (ref 0.1–1.3)
MONOCYTES NFR BLD AUTO: 8 %
NEUTROPHILS # BLD AUTO: 1.4 X10(3)/MCL (ref 2.1–9.2)
NEUTROPHILS NFR BLD AUTO: 37.3 %
PLATELET # BLD AUTO: 288 X10(3)/MCL (ref 130–400)
PMV BLD AUTO: 12.7 FL (ref 9.4–12.4)
POTASSIUM SERPL-SCNC: 4.6 MMOL/L (ref 3.5–5.1)
PROT SERPL-MCNC: 6.6 GM/DL (ref 5.8–7.6)
RBC # BLD AUTO: 2.85 X10(6)/MCL (ref 4.2–5.4)
SODIUM SERPL-SCNC: 141 MMOL/L (ref 132–146)
TIBC SERPL-MCNC: 230 UG/DL (ref 250–450)
TIBC SERPL-MCNC: 99 UG/DL (ref 70–310)
TRANSFERRIN SERPL-MCNC: 202 MG/DL
WBC # SPEC AUTO: 3.6 X10(3)/MCL (ref 4.5–11.5)

## 2021-11-09 ENCOUNTER — HISTORICAL (OUTPATIENT)
Dept: INFUSION THERAPY | Facility: HOSPITAL | Age: 86
End: 2021-11-09

## 2021-11-09 LAB
ABS NEUT (OLG): 1.66 X10(3)/MCL (ref 2.1–9.2)
BASOPHILS # BLD AUTO: 0.1 X10(3)/MCL (ref 0–0.2)
BASOPHILS NFR BLD AUTO: 2.9 %
CROSSMATCH INTERPRETATION: NORMAL
EOSINOPHIL # BLD AUTO: 0.4 X10(3)/MCL (ref 0–0.9)
EOSINOPHIL NFR BLD AUTO: 8.7 %
ERYTHROCYTE [DISTWIDTH] IN BLOOD BY AUTOMATED COUNT: 18.5 % (ref 11.5–17)
GROUP & RH: NORMAL
HCT VFR BLD AUTO: 22.7 % (ref 37–47)
HGB BLD-MCNC: 7.2 GM/DL (ref 12–16)
LYMPHOCYTES # BLD AUTO: 1.7 X10(3)/MCL (ref 0.6–4.6)
LYMPHOCYTES NFR BLD AUTO: 40.5 %
MCH RBC QN AUTO: 36.9 PG (ref 27–31)
MCHC RBC AUTO-ENTMCNC: 31.7 GM/DL (ref 33–36)
MCV RBC AUTO: 116.4 FL (ref 80–94)
MONOCYTES # BLD AUTO: 0.3 X10(3)/MCL (ref 0.1–1.3)
MONOCYTES NFR BLD AUTO: 7.5 %
NEUTROPHILS # BLD AUTO: 1.7 X10(3)/MCL (ref 2.1–9.2)
NEUTROPHILS NFR BLD AUTO: 39.9 %
PLATELET # BLD AUTO: 316 X10(3)/MCL (ref 130–400)
PMV BLD AUTO: 11.6 FL (ref 9.4–12.4)
PRODUCT READY: NORMAL
RBC # BLD AUTO: 1.95 X10(6)/MCL (ref 4.2–5.4)
TRANSFUSION ORDER: NORMAL
WBC # SPEC AUTO: 4.2 X10(3)/MCL (ref 4.5–11.5)

## 2022-01-01 ENCOUNTER — OFFICE VISIT (OUTPATIENT)
Dept: FAMILY MEDICINE | Facility: CLINIC | Age: 87
End: 2022-01-01
Payer: MEDICARE

## 2022-01-01 ENCOUNTER — TELEPHONE (OUTPATIENT)
Dept: HEMATOLOGY/ONCOLOGY | Facility: CLINIC | Age: 87
End: 2022-01-01
Payer: MEDICARE

## 2022-01-01 ENCOUNTER — LAB VISIT (OUTPATIENT)
Dept: LAB | Facility: HOSPITAL | Age: 87
End: 2022-01-01
Attending: INTERNAL MEDICINE
Payer: MEDICARE

## 2022-01-01 ENCOUNTER — PATIENT MESSAGE (OUTPATIENT)
Dept: HEMATOLOGY/ONCOLOGY | Facility: CLINIC | Age: 87
End: 2022-01-01
Payer: MEDICARE

## 2022-01-01 ENCOUNTER — OFFICE VISIT (OUTPATIENT)
Dept: HEMATOLOGY/ONCOLOGY | Facility: CLINIC | Age: 87
End: 2022-01-01
Payer: MEDICARE

## 2022-01-01 ENCOUNTER — TELEPHONE (OUTPATIENT)
Dept: FAMILY MEDICINE | Facility: CLINIC | Age: 87
End: 2022-01-01
Payer: MEDICARE

## 2022-01-01 ENCOUNTER — HOSPITAL ENCOUNTER (INPATIENT)
Facility: HOSPITAL | Age: 87
LOS: 3 days | Discharge: HOME HEALTH | DRG: 149 | End: 2022-09-03
Attending: EMERGENCY MEDICINE | Admitting: INTERNAL MEDICINE
Payer: MEDICARE

## 2022-01-01 ENCOUNTER — HOSPITAL ENCOUNTER (EMERGENCY)
Facility: HOSPITAL | Age: 87
Discharge: HOME | End: 2022-11-11
Attending: FAMILY MEDICINE
Payer: MEDICARE

## 2022-01-01 ENCOUNTER — INFUSION (OUTPATIENT)
Dept: INFUSION THERAPY | Facility: HOSPITAL | Age: 87
End: 2022-01-01
Attending: INTERNAL MEDICINE
Payer: MEDICARE

## 2022-01-01 ENCOUNTER — APPOINTMENT (OUTPATIENT)
Dept: LAB | Facility: HOSPITAL | Age: 87
End: 2022-01-01
Attending: NURSE PRACTITIONER
Payer: MEDICARE

## 2022-01-01 ENCOUNTER — HISTORICAL (OUTPATIENT)
Dept: ADMINISTRATIVE | Facility: HOSPITAL | Age: 87
End: 2022-01-01

## 2022-01-01 ENCOUNTER — HOSPITAL ENCOUNTER (OUTPATIENT)
Dept: ORTHOPEDICS | Facility: HOSPITAL | Age: 87
End: 2022-04-16
Attending: INTERNAL MEDICINE | Admitting: INTERNAL MEDICINE

## 2022-01-01 ENCOUNTER — PATIENT OUTREACH (OUTPATIENT)
Dept: HOME HEALTH SERVICES | Facility: HOSPITAL | Age: 87
End: 2022-01-01
Payer: MEDICARE

## 2022-01-01 ENCOUNTER — HISTORICAL (OUTPATIENT)
Dept: INFUSION THERAPY | Facility: HOSPITAL | Age: 87
End: 2022-01-01

## 2022-01-01 ENCOUNTER — SPECIALTY PHARMACY (OUTPATIENT)
Dept: PHARMACY | Facility: CLINIC | Age: 87
End: 2022-01-01
Payer: MEDICARE

## 2022-01-01 ENCOUNTER — HISTORICAL (OUTPATIENT)
Dept: HEMATOLOGY/ONCOLOGY | Facility: CLINIC | Age: 87
End: 2022-01-01

## 2022-01-01 ENCOUNTER — HISTORICAL (OUTPATIENT)
Dept: ADMINISTRATIVE | Facility: HOSPITAL | Age: 87
End: 2022-01-01
Payer: MEDICARE

## 2022-01-01 ENCOUNTER — EXTERNAL HOME HEALTH (OUTPATIENT)
Dept: HOME HEALTH SERVICES | Facility: HOSPITAL | Age: 87
End: 2022-01-01
Payer: MEDICARE

## 2022-01-01 ENCOUNTER — EXTERNAL HOSPITAL ADMISSION (OUTPATIENT)
Dept: ADMINISTRATIVE | Facility: CLINIC | Age: 87
End: 2022-01-01
Payer: MEDICARE

## 2022-01-01 ENCOUNTER — DOCUMENTATION ONLY (OUTPATIENT)
Dept: FAMILY MEDICINE | Facility: CLINIC | Age: 87
End: 2022-01-01
Payer: MEDICARE

## 2022-01-01 ENCOUNTER — HISTORICAL (OUTPATIENT)
Dept: HEMATOLOGY/ONCOLOGY | Facility: CLINIC | Age: 87
End: 2022-01-01
Payer: MEDICARE

## 2022-01-01 ENCOUNTER — INFUSION (OUTPATIENT)
Dept: INFUSION THERAPY | Facility: HOSPITAL | Age: 87
End: 2022-01-01
Attending: NURSE PRACTITIONER
Payer: MEDICARE

## 2022-01-01 ENCOUNTER — OFFICE VISIT (OUTPATIENT)
Dept: ORTHOPEDICS | Facility: CLINIC | Age: 87
End: 2022-01-01
Payer: MEDICARE

## 2022-01-01 ENCOUNTER — TELEPHONE (OUTPATIENT)
Dept: FAMILY MEDICINE | Facility: CLINIC | Age: 87
End: 2022-01-01

## 2022-01-01 ENCOUNTER — PATIENT MESSAGE (OUTPATIENT)
Dept: PHARMACY | Facility: CLINIC | Age: 87
End: 2022-01-01
Payer: MEDICARE

## 2022-01-01 ENCOUNTER — HOSPITAL ENCOUNTER (OUTPATIENT)
Facility: HOSPITAL | Age: 87
Discharge: HOME | End: 2022-07-05
Attending: PATHOLOGY | Admitting: PATHOLOGY
Payer: MEDICARE

## 2022-01-01 ENCOUNTER — PATIENT OUTREACH (OUTPATIENT)
Dept: ADMINISTRATIVE | Facility: CLINIC | Age: 87
End: 2022-01-01
Payer: MEDICARE

## 2022-01-01 ENCOUNTER — HOSPITAL ENCOUNTER (INPATIENT)
Facility: HOSPITAL | Age: 87
LOS: 1 days | Discharge: HOME | DRG: 563 | End: 2022-11-15
Attending: STUDENT IN AN ORGANIZED HEALTH CARE EDUCATION/TRAINING PROGRAM | Admitting: INTERNAL MEDICINE
Payer: MEDICARE

## 2022-01-01 VITALS
DIASTOLIC BLOOD PRESSURE: 54 MMHG | RESPIRATION RATE: 18 BRPM | HEART RATE: 73 BPM | WEIGHT: 130.5 LBS | HEIGHT: 62 IN | BODY MASS INDEX: 24.01 KG/M2 | HEART RATE: 79 BPM | OXYGEN SATURATION: 98 % | HEIGHT: 62 IN | TEMPERATURE: 98 F | SYSTOLIC BLOOD PRESSURE: 128 MMHG | RESPIRATION RATE: 18 BRPM | SYSTOLIC BLOOD PRESSURE: 115 MMHG | TEMPERATURE: 98 F | OXYGEN SATURATION: 99 % | DIASTOLIC BLOOD PRESSURE: 60 MMHG | WEIGHT: 130.5 LBS | BODY MASS INDEX: 24.01 KG/M2

## 2022-01-01 VITALS
OXYGEN SATURATION: 98 % | HEIGHT: 62 IN | SYSTOLIC BLOOD PRESSURE: 157 MMHG | WEIGHT: 134 LBS | RESPIRATION RATE: 18 BRPM | BODY MASS INDEX: 24.66 KG/M2 | DIASTOLIC BLOOD PRESSURE: 63 MMHG | TEMPERATURE: 98 F | HEART RATE: 67 BPM

## 2022-01-01 VITALS
BODY MASS INDEX: 24.46 KG/M2 | HEART RATE: 64 BPM | TEMPERATURE: 98 F | OXYGEN SATURATION: 98 % | DIASTOLIC BLOOD PRESSURE: 55 MMHG | WEIGHT: 132.94 LBS | RESPIRATION RATE: 18 BRPM | HEIGHT: 62 IN | SYSTOLIC BLOOD PRESSURE: 115 MMHG

## 2022-01-01 VITALS
DIASTOLIC BLOOD PRESSURE: 50 MMHG | WEIGHT: 126.13 LBS | TEMPERATURE: 99 F | OXYGEN SATURATION: 97 % | HEART RATE: 80 BPM | HEIGHT: 62 IN | SYSTOLIC BLOOD PRESSURE: 96 MMHG | RESPIRATION RATE: 18 BRPM | BODY MASS INDEX: 23.21 KG/M2

## 2022-01-01 VITALS
WEIGHT: 128.31 LBS | TEMPERATURE: 98 F | BODY MASS INDEX: 23.61 KG/M2 | RESPIRATION RATE: 18 BRPM | HEART RATE: 92 BPM | HEIGHT: 62 IN | OXYGEN SATURATION: 99 % | SYSTOLIC BLOOD PRESSURE: 101 MMHG | DIASTOLIC BLOOD PRESSURE: 60 MMHG

## 2022-01-01 VITALS
RESPIRATION RATE: 18 BRPM | SYSTOLIC BLOOD PRESSURE: 98 MMHG | HEART RATE: 84 BPM | WEIGHT: 125 LBS | HEIGHT: 62 IN | OXYGEN SATURATION: 95 % | BODY MASS INDEX: 23 KG/M2 | TEMPERATURE: 98 F | DIASTOLIC BLOOD PRESSURE: 60 MMHG

## 2022-01-01 VITALS
HEIGHT: 62 IN | WEIGHT: 127.63 LBS | WEIGHT: 127 LBS | BODY MASS INDEX: 23.49 KG/M2 | SYSTOLIC BLOOD PRESSURE: 91 MMHG | OXYGEN SATURATION: 97 % | HEIGHT: 63 IN | TEMPERATURE: 98 F | HEART RATE: 78 BPM | BODY MASS INDEX: 22.5 KG/M2 | RESPIRATION RATE: 20 BRPM | DIASTOLIC BLOOD PRESSURE: 54 MMHG

## 2022-01-01 VITALS
HEIGHT: 61 IN | BODY MASS INDEX: 25.14 KG/M2 | OXYGEN SATURATION: 99 % | WEIGHT: 133.19 LBS | SYSTOLIC BLOOD PRESSURE: 131 MMHG | DIASTOLIC BLOOD PRESSURE: 58 MMHG

## 2022-01-01 VITALS
OXYGEN SATURATION: 100 % | DIASTOLIC BLOOD PRESSURE: 58 MMHG | BODY MASS INDEX: 23.85 KG/M2 | HEART RATE: 83 BPM | TEMPERATURE: 98 F | HEART RATE: 118 BPM | TEMPERATURE: 99 F | WEIGHT: 129.63 LBS | RESPIRATION RATE: 16 BRPM | SYSTOLIC BLOOD PRESSURE: 111 MMHG | DIASTOLIC BLOOD PRESSURE: 55 MMHG | HEIGHT: 62 IN | SYSTOLIC BLOOD PRESSURE: 118 MMHG | RESPIRATION RATE: 20 BRPM

## 2022-01-01 VITALS
DIASTOLIC BLOOD PRESSURE: 60 MMHG | OXYGEN SATURATION: 98 % | HEIGHT: 62 IN | RESPIRATION RATE: 16 BRPM | HEART RATE: 78 BPM | WEIGHT: 129.63 LBS | TEMPERATURE: 98 F | BODY MASS INDEX: 23.53 KG/M2 | SYSTOLIC BLOOD PRESSURE: 118 MMHG | DIASTOLIC BLOOD PRESSURE: 74 MMHG | WEIGHT: 128.69 LBS | TEMPERATURE: 99 F | RESPIRATION RATE: 18 BRPM | BODY MASS INDEX: 23.85 KG/M2 | SYSTOLIC BLOOD PRESSURE: 118 MMHG

## 2022-01-01 VITALS
HEIGHT: 62 IN | SYSTOLIC BLOOD PRESSURE: 137 MMHG | RESPIRATION RATE: 18 BRPM | DIASTOLIC BLOOD PRESSURE: 59 MMHG | TEMPERATURE: 99 F | WEIGHT: 132.94 LBS | BODY MASS INDEX: 24.46 KG/M2 | HEART RATE: 73 BPM | OXYGEN SATURATION: 100 %

## 2022-01-01 VITALS
TEMPERATURE: 98 F | RESPIRATION RATE: 16 BRPM | DIASTOLIC BLOOD PRESSURE: 61 MMHG | OXYGEN SATURATION: 100 % | SYSTOLIC BLOOD PRESSURE: 125 MMHG | HEART RATE: 89 BPM

## 2022-01-01 VITALS
OXYGEN SATURATION: 100 % | HEART RATE: 63 BPM | DIASTOLIC BLOOD PRESSURE: 72 MMHG | TEMPERATURE: 98 F | RESPIRATION RATE: 20 BRPM | SYSTOLIC BLOOD PRESSURE: 145 MMHG

## 2022-01-01 VITALS
TEMPERATURE: 98 F | DIASTOLIC BLOOD PRESSURE: 59 MMHG | RESPIRATION RATE: 20 BRPM | OXYGEN SATURATION: 98 % | BODY MASS INDEX: 23.61 KG/M2 | SYSTOLIC BLOOD PRESSURE: 115 MMHG | HEART RATE: 77 BPM | WEIGHT: 128.31 LBS

## 2022-01-01 VITALS
TEMPERATURE: 98 F | HEIGHT: 62 IN | OXYGEN SATURATION: 100 % | BODY MASS INDEX: 23.61 KG/M2 | WEIGHT: 128.31 LBS | DIASTOLIC BLOOD PRESSURE: 55 MMHG | RESPIRATION RATE: 18 BRPM | HEART RATE: 69 BPM | SYSTOLIC BLOOD PRESSURE: 122 MMHG

## 2022-01-01 VITALS
OXYGEN SATURATION: 100 % | HEIGHT: 62 IN | BODY MASS INDEX: 24.51 KG/M2 | SYSTOLIC BLOOD PRESSURE: 129 MMHG | TEMPERATURE: 98 F | RESPIRATION RATE: 18 BRPM | HEART RATE: 69 BPM | WEIGHT: 133.19 LBS | DIASTOLIC BLOOD PRESSURE: 58 MMHG

## 2022-01-01 VITALS
HEART RATE: 67 BPM | TEMPERATURE: 98 F | WEIGHT: 132 LBS | DIASTOLIC BLOOD PRESSURE: 67 MMHG | OXYGEN SATURATION: 93 % | SYSTOLIC BLOOD PRESSURE: 154 MMHG | HEIGHT: 62 IN | BODY MASS INDEX: 24.29 KG/M2

## 2022-01-01 VITALS
SYSTOLIC BLOOD PRESSURE: 118 MMHG | DIASTOLIC BLOOD PRESSURE: 59 MMHG | HEART RATE: 63 BPM | WEIGHT: 132.06 LBS | OXYGEN SATURATION: 98 % | BODY MASS INDEX: 23.99 KG/M2

## 2022-01-01 VITALS
RESPIRATION RATE: 18 BRPM | HEART RATE: 72 BPM | RESPIRATION RATE: 16 BRPM | SYSTOLIC BLOOD PRESSURE: 129 MMHG | TEMPERATURE: 98 F | HEART RATE: 65 BPM | TEMPERATURE: 98 F | DIASTOLIC BLOOD PRESSURE: 54 MMHG | DIASTOLIC BLOOD PRESSURE: 52 MMHG | SYSTOLIC BLOOD PRESSURE: 114 MMHG | OXYGEN SATURATION: 100 %

## 2022-01-01 VITALS
SYSTOLIC BLOOD PRESSURE: 107 MMHG | DIASTOLIC BLOOD PRESSURE: 55 MMHG | RESPIRATION RATE: 16 BRPM | TEMPERATURE: 98 F | HEART RATE: 69 BPM

## 2022-01-01 VITALS
SYSTOLIC BLOOD PRESSURE: 108 MMHG | OXYGEN SATURATION: 98 % | HEART RATE: 66 BPM | HEIGHT: 62 IN | WEIGHT: 132.69 LBS | TEMPERATURE: 98 F | RESPIRATION RATE: 18 BRPM | BODY MASS INDEX: 24.42 KG/M2 | DIASTOLIC BLOOD PRESSURE: 58 MMHG

## 2022-01-01 VITALS — HEART RATE: 78 BPM | TEMPERATURE: 98 F | DIASTOLIC BLOOD PRESSURE: 51 MMHG | SYSTOLIC BLOOD PRESSURE: 132 MMHG

## 2022-01-01 VITALS
WEIGHT: 132.19 LBS | HEART RATE: 64 BPM | DIASTOLIC BLOOD PRESSURE: 68 MMHG | RESPIRATION RATE: 16 BRPM | BODY MASS INDEX: 24.02 KG/M2 | TEMPERATURE: 98 F | OXYGEN SATURATION: 98 % | SYSTOLIC BLOOD PRESSURE: 108 MMHG

## 2022-01-01 VITALS
RESPIRATION RATE: 20 BRPM | SYSTOLIC BLOOD PRESSURE: 137 MMHG | OXYGEN SATURATION: 100 % | WEIGHT: 128 LBS | TEMPERATURE: 98 F | DIASTOLIC BLOOD PRESSURE: 69 MMHG | HEIGHT: 62 IN | HEART RATE: 75 BPM | BODY MASS INDEX: 23.55 KG/M2

## 2022-01-01 DIAGNOSIS — D46.C MDS (MYELODYSPLASTIC SYNDROME) WITH 5Q DELETION: Primary | ICD-10-CM

## 2022-01-01 DIAGNOSIS — Z23 NEEDS FLU SHOT: ICD-10-CM

## 2022-01-01 DIAGNOSIS — M25.522 LEFT ELBOW PAIN: Primary | ICD-10-CM

## 2022-01-01 DIAGNOSIS — D50.0 ANEMIA DUE TO CHRONIC BLOOD LOSS: ICD-10-CM

## 2022-01-01 DIAGNOSIS — M25.522 LEFT ELBOW PAIN: ICD-10-CM

## 2022-01-01 DIAGNOSIS — K59.00 CONSTIPATION, UNSPECIFIED CONSTIPATION TYPE: ICD-10-CM

## 2022-01-01 DIAGNOSIS — D46.C MDS (MYELODYSPLASTIC SYNDROME) WITH 5Q DELETION: ICD-10-CM

## 2022-01-01 DIAGNOSIS — D53.9 MACROCYTIC ANEMIA: ICD-10-CM

## 2022-01-01 DIAGNOSIS — D53.9 MACROCYTIC ANEMIA: Primary | ICD-10-CM

## 2022-01-01 DIAGNOSIS — D72.819 LEUKOPENIA, UNSPECIFIED TYPE: ICD-10-CM

## 2022-01-01 DIAGNOSIS — N30.90 CYSTITIS: ICD-10-CM

## 2022-01-01 DIAGNOSIS — D70.9 NEUTROPENIA, UNSPECIFIED TYPE: ICD-10-CM

## 2022-01-01 DIAGNOSIS — D70.8 OTHER NEUTROPENIA: ICD-10-CM

## 2022-01-01 DIAGNOSIS — R42 DIZZINESS: ICD-10-CM

## 2022-01-01 DIAGNOSIS — I63.9 CVA (CEREBRAL VASCULAR ACCIDENT): ICD-10-CM

## 2022-01-01 DIAGNOSIS — Z71.89 ADVANCED CARE PLANNING/COUNSELING DISCUSSION: ICD-10-CM

## 2022-01-01 DIAGNOSIS — S42.402A CLOSED FRACTURE OF LEFT ELBOW, INITIAL ENCOUNTER: ICD-10-CM

## 2022-01-01 DIAGNOSIS — Z74.09 MOBILITY IMPAIRED: ICD-10-CM

## 2022-01-01 DIAGNOSIS — I10 HYPERTENSION, UNSPECIFIED TYPE: ICD-10-CM

## 2022-01-01 DIAGNOSIS — D46.9 MDS (MYELODYSPLASTIC SYNDROME): ICD-10-CM

## 2022-01-01 DIAGNOSIS — D64.9 ANEMIA, UNSPECIFIED TYPE: ICD-10-CM

## 2022-01-01 DIAGNOSIS — Z78.0 POSTMENOPAUSAL: ICD-10-CM

## 2022-01-01 DIAGNOSIS — R27.0 ATAXIA: Primary | ICD-10-CM

## 2022-01-01 DIAGNOSIS — S42.492A OTHER CLOSED DISPLACED FRACTURE OF DISTAL END OF LEFT HUMERUS, INITIAL ENCOUNTER: Primary | ICD-10-CM

## 2022-01-01 DIAGNOSIS — S50.02XA CONTUSION OF LEFT ELBOW, INITIAL ENCOUNTER: Primary | ICD-10-CM

## 2022-01-01 DIAGNOSIS — S51.802A AVULSION OF SKIN OF FOREARM, LEFT, INITIAL ENCOUNTER: ICD-10-CM

## 2022-01-01 DIAGNOSIS — E78.5 HYPERLIPIDEMIA, UNSPECIFIED HYPERLIPIDEMIA TYPE: ICD-10-CM

## 2022-01-01 DIAGNOSIS — R32 URINARY INCONTINENCE, UNSPECIFIED TYPE: Primary | ICD-10-CM

## 2022-01-01 DIAGNOSIS — L30.9 ECZEMA, UNSPECIFIED TYPE: ICD-10-CM

## 2022-01-01 DIAGNOSIS — D64.9 ANEMIA, UNSPECIFIED TYPE: Primary | ICD-10-CM

## 2022-01-01 DIAGNOSIS — M89.9 SKULL LESION: ICD-10-CM

## 2022-01-01 DIAGNOSIS — N39.0 URINARY TRACT INFECTION WITHOUT HEMATURIA, SITE UNSPECIFIED: ICD-10-CM

## 2022-01-01 DIAGNOSIS — R30.0 DYSURIA: ICD-10-CM

## 2022-01-01 DIAGNOSIS — S42.412A CLOSED SUPRACONDYLAR FRACTURE OF LEFT HUMERUS, INITIAL ENCOUNTER: ICD-10-CM

## 2022-01-01 DIAGNOSIS — E03.9 HYPOTHYROIDISM, UNSPECIFIED TYPE: ICD-10-CM

## 2022-01-01 DIAGNOSIS — D50.0 ANEMIA DUE TO CHRONIC BLOOD LOSS: Primary | ICD-10-CM

## 2022-01-01 DIAGNOSIS — I25.10 CORONARY ARTERY DISEASE, UNSPECIFIED VESSEL OR LESION TYPE, UNSPECIFIED WHETHER ANGINA PRESENT, UNSPECIFIED WHETHER NATIVE OR TRANSPLANTED HEART: ICD-10-CM

## 2022-01-01 DIAGNOSIS — Z00.00 ENCOUNTER FOR SUBSEQUENT ANNUAL WELLNESS VISIT (AWV) IN MEDICARE PATIENT: ICD-10-CM
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
## 2022-01-01 DIAGNOSIS — D46.9 MDS (MYELODYSPLASTIC SYNDROME): Primary | ICD-10-CM

## 2022-01-01 DIAGNOSIS — I49.9 ARRHYTHMIA: ICD-10-CM

## 2022-01-01 LAB
ABO + RH BLD: NORMAL
ABORH RETYPE: NORMAL
ABS NEUT (OLG): 0.99 (ref 2.1–9.2)
ABS NEUT (OLG): 0.99 X10(3)/MCL (ref 2.1–9.2)
ABS NEUT (OLG): 1.02 (ref 2.1–9.2)
ABS NEUT (OLG): 1.03 (ref 2.1–9.2)
ABS NEUT (OLG): 1.15 (ref 2.1–9.2)
ABS NEUT (OLG): 1.24 (ref 2.1–9.2)
ABS NEUT (OLG): 1.48 X10(3)/MCL (ref 2.1–9.2)
ABS NEUT (OLG): 1.52 (ref 2.1–9.2)
ABS NEUT (OLG): 1.97 X10(3)/MCL (ref 2.1–9.2)
ABS NEUT (OLG): 2.03 (ref 2.1–9.2)
ABS NEUT (OLG): 2.24 X10(3)/MCL (ref 2.1–9.2)
ABS NEUT CALC (OHS): 0.76 X10(3)/MCL (ref 2.1–9.2)
ABS NEUT CALC (OHS): 1.1 X10(3)/MCL (ref 2.1–9.2)
ABS NEUT CALC (OHS): 1.11 X10(3)/MCL (ref 2.1–9.2)
ABS NEUT CALC (OHS): 1.75 X10(3)/MCL (ref 2.1–9.2)
ABS NEUT CALC (OHS): ABNORMAL
ABS NEUT CALC (OHS): ABNORMAL
ALBUMIN SERPL-MCNC: 3 GM/DL (ref 3.4–4.8)
ALBUMIN SERPL-MCNC: 3.1 GM/DL (ref 3.4–4.8)
ALBUMIN SERPL-MCNC: 3.4 GM/DL (ref 3.4–4.8)
ALBUMIN SERPL-MCNC: 3.4 GM/DL (ref 3.4–4.8)
ALBUMIN SERPL-MCNC: 3.7 GM/DL (ref 3.4–4.8)
ALBUMIN SERPL-MCNC: 3.8 G/DL (ref 3.4–4.8)
ALBUMIN SERPL-MCNC: 3.8 G/DL (ref 3.4–4.8)
ALBUMIN SERPL-MCNC: 3.8 GM/DL (ref 3.4–4.8)
ALBUMIN SERPL-MCNC: 3.9 G/DL (ref 3.4–4.8)
ALBUMIN SERPL-MCNC: 3.9 G/DL (ref 3.4–4.8)
ALBUMIN SERPL-MCNC: 3.9 GM/DL (ref 3.4–4.8)
ALBUMIN SERPL-MCNC: 3.9 GM/DL (ref 3.4–4.8)
ALBUMIN SERPL-MCNC: 4 G/DL (ref 3.4–4.8)
ALBUMIN SERPL-MCNC: 4 GM/DL (ref 3.4–4.8)
ALBUMIN/GLOB SERPL: 1 RATIO (ref 1.1–2)
ALBUMIN/GLOB SERPL: 1 RATIO (ref 1.1–2)
ALBUMIN/GLOB SERPL: 1.2 RATIO (ref 1.1–2)
ALBUMIN/GLOB SERPL: 1.3 RATIO (ref 1.1–2)
ALBUMIN/GLOB SERPL: 1.3 RATIO (ref 1.1–2)
ALBUMIN/GLOB SERPL: 1.5 RATIO (ref 1.1–2)
ALBUMIN/GLOB SERPL: 1.5 {RATIO} (ref 1.1–2)
ALBUMIN/GLOB SERPL: 1.6 RATIO (ref 1.1–2)
ALBUMIN/GLOB SERPL: 1.6 {RATIO} (ref 1.1–2)
ALBUMIN/GLOB SERPL: 1.7 RATIO (ref 1.1–2)
ALP SERPL-CCNC: 105 UNIT/L (ref 40–150)
ALP SERPL-CCNC: 118 UNIT/L (ref 40–150)
ALP SERPL-CCNC: 76 UNIT/L (ref 40–150)
ALP SERPL-CCNC: 76 UNIT/L (ref 40–150)
ALP SERPL-CCNC: 78 U/L (ref 40–150)
ALP SERPL-CCNC: 81 UNIT/L (ref 40–150)
ALP SERPL-CCNC: 82 UNIT/L (ref 40–150)
ALP SERPL-CCNC: 84 U/L (ref 40–150)
ALP SERPL-CCNC: 85 UNIT/L (ref 40–150)
ALP SERPL-CCNC: 88 UNIT/L (ref 40–150)
ALP SERPL-CCNC: 90 UNIT/L (ref 40–150)
ALP SERPL-CCNC: 92 U/L (ref 40–150)
ALP SERPL-CCNC: 96 UNIT/L (ref 40–150)
ALT SERPL-CCNC: 11 UNIT/L (ref 0–55)
ALT SERPL-CCNC: 12 U/L (ref 0–55)
ALT SERPL-CCNC: 12 U/L (ref 0–55)
ALT SERPL-CCNC: 12 UNIT/L (ref 0–55)
ALT SERPL-CCNC: 13 U/L (ref 0–55)
ALT SERPL-CCNC: 13 U/L (ref 0–55)
ALT SERPL-CCNC: 13 UNIT/L (ref 0–55)
ALT SERPL-CCNC: 14 U/L (ref 0–55)
ALT SERPL-CCNC: 14 UNIT/L (ref 0–55)
ALT SERPL-CCNC: 15 UNIT/L (ref 0–55)
ALT SERPL-CCNC: 21 UNIT/L (ref 0–55)
ALT SERPL-CCNC: 40 UNIT/L (ref 0–55)
ANION GAP SERPL CALC-SCNC: 5 MEQ/L
ANION GAP SERPL CALC-SCNC: 7 MEQ/L
ANION GAP SERPL CALC-SCNC: 8 MEQ/L
ANISOCYTOSIS BLD QL SMEAR: ABNORMAL
ANISOCYTOSIS BLD QL SMEAR: NORMAL
APPEARANCE UR: ABNORMAL
APPEARANCE UR: CLEAR
APPEARANCE, UA: NORMAL
APTT PPP: 31.8 SECONDS (ref 23.4–33.9)
AST SERPL-CCNC: 12 U/L (ref 5–34)
AST SERPL-CCNC: 12 U/L (ref 5–34)
AST SERPL-CCNC: 12 UNIT/L (ref 5–34)
AST SERPL-CCNC: 13 U/L (ref 5–34)
AST SERPL-CCNC: 14 UNIT/L (ref 5–34)
AST SERPL-CCNC: 15 U/L (ref 5–34)
AST SERPL-CCNC: 15 U/L (ref 5–34)
AST SERPL-CCNC: 15 UNIT/L (ref 5–34)
AST SERPL-CCNC: 15 UNIT/L (ref 5–34)
AST SERPL-CCNC: 16 UNIT/L (ref 5–34)
AST SERPL-CCNC: 16 UNIT/L (ref 5–34)
AST SERPL-CCNC: 17 UNIT/L (ref 5–34)
AST SERPL-CCNC: 28 UNIT/L (ref 5–34)
AV INDEX (PROSTH): 0.27
AV MEAN GRADIENT: 28 MMHG
AV PEAK GRADIENT: 51 MMHG
AV REGURGITATION PRESSURE HALF TIME: 343 MS
AV VALVE AREA: 0.84 CM2
AV VELOCITY RATIO: 0.26
BACTERIA #/AREA URNS AUTO: ABNORMAL /HPF
BACTERIA #/AREA URNS AUTO: ABNORMAL /HPF
BACTERIA SPEC CULT: NORMAL
BACTERIA UR CULT: ABNORMAL
BASOPHILS # BLD AUTO: 0 10*3/UL (ref 0–0.2)
BASOPHILS # BLD AUTO: 0.06 10*3/UL (ref 0–0.2)
BASOPHILS # BLD AUTO: 0.08 X10(3)/MCL (ref 0–0.2)
BASOPHILS # BLD AUTO: 0.09 X10(3)/MCL (ref 0–0.2)
BASOPHILS # BLD AUTO: 0.1 10*3/UL (ref 0–0.2)
BASOPHILS # BLD AUTO: 0.11 X10(3)/MCL (ref 0–0.2)
BASOPHILS # BLD AUTO: 0.14 X10(3)/MCL (ref 0–0.2)
BASOPHILS # BLD AUTO: 0.2 10*3/UL (ref 0–0.2)
BASOPHILS # BLD AUTO: 0.21 X10(3)/MCL (ref 0–0.2)
BASOPHILS NFR BLD AUTO: 1.8 % (ref 0–1)
BASOPHILS NFR BLD AUTO: 2 %
BASOPHILS NFR BLD AUTO: 3.1 %
BASOPHILS NFR BLD AUTO: 3.1 %
BASOPHILS NFR BLD AUTO: 3.2 %
BASOPHILS NFR BLD AUTO: 3.3 %
BASOPHILS NFR BLD AUTO: 3.4 %
BASOPHILS NFR BLD AUTO: 3.5 %
BASOPHILS NFR BLD AUTO: 3.6 %
BASOPHILS NFR BLD AUTO: 3.6 %
BASOPHILS NFR BLD AUTO: 4 %
BASOPHILS NFR BLD AUTO: 4 %
BASOPHILS NFR BLD AUTO: 4.1 %
BASOPHILS NFR BLD AUTO: 4.1 %
BASOPHILS NFR BLD AUTO: 4.2 %
BASOPHILS NFR BLD AUTO: 5.6 %
BASOPHILS NFR BLD MANUAL: 0.03 X10(3)/MCL (ref 0–0.2)
BASOPHILS NFR BLD MANUAL: 0.07 X10(3)/MCL (ref 0–0.2)
BASOPHILS NFR BLD MANUAL: 0.08 X10(3)/MCL (ref 0–0.2)
BASOPHILS NFR BLD MANUAL: 0.08 X10(3)/MCL (ref 0–0.2)
BASOPHILS NFR BLD MANUAL: 0.16 X10(3)/MCL (ref 0–0.2)
BASOPHILS NFR BLD MANUAL: 1 % (ref 0–2)
BASOPHILS NFR BLD MANUAL: 3 %
BASOPHILS NFR BLD MANUAL: 3 %
BASOPHILS NFR BLD MANUAL: 3 % (ref 0–2)
BASOPHILS NFR BLD MANUAL: 4 %
BILIRUB SERPL-MCNC: 0.3 MG/DL
BILIRUB SERPL-MCNC: 0.4 MG/DL
BILIRUB SERPL-MCNC: 0.4 MG/DL (ref 0.2–1.2)
BILIRUB SERPL-MCNC: 0.5 MG/DL
BILIRUB SERPL-MCNC: 0.5 MG/DL
BILIRUB UR QL STRIP.AUTO: NEGATIVE
BILIRUB UR QL STRIP.AUTO: NEGATIVE MG/DL
BILIRUB UR QL STRIP.AUTO: NEGATIVE MG/DL
BILIRUB UR QL STRIP: NEGATIVE
BILIRUBIN DIRECT+TOT PNL SERPL-MCNC: 0.1 (ref 0–0.5)
BILIRUBIN DIRECT+TOT PNL SERPL-MCNC: 0.1 (ref 0–0.5)
BILIRUBIN DIRECT+TOT PNL SERPL-MCNC: 0.2 (ref 0–0.5)
BILIRUBIN DIRECT+TOT PNL SERPL-MCNC: 0.2 (ref 0–0.8)
BILIRUBIN DIRECT+TOT PNL SERPL-MCNC: 0.2 (ref 0–0.8)
BILIRUBIN DIRECT+TOT PNL SERPL-MCNC: 0.3 (ref 0–0.8)
BILIRUBIN DIRECT+TOT PNL SERPL-MCNC: 0.3 MG/DL
BILIRUBIN DIRECT+TOT PNL SERPL-MCNC: 0.4 MG/DL
BILIRUBIN DIRECT+TOT PNL SERPL-MCNC: 0.5 MG/DL
BILIRUBIN DIRECT+TOT PNL SERPL-MCNC: 0.7 MG/DL
BILIRUBIN DIRECT+TOT PNL SERPL-MCNC: 1.1 MG/DL
BLD PROD TYP BPU: NORMAL
BLOOD UNIT EXPIRATION DATE: NORMAL
BLOOD UNIT TYPE CODE: 600
BLOOD UNIT TYPE CODE: 6200
BSA FOR ECHO PROCEDURE: 1.63 M2
BUN SERPL-MCNC: 16.2 MG/DL (ref 9.8–20.1)
BUN SERPL-MCNC: 17.6 MG/DL (ref 9.8–20.1)
BUN SERPL-MCNC: 18.8 MG/DL (ref 9.8–20.1)
BUN SERPL-MCNC: 19.3 MG/DL (ref 9.8–20.1)
BUN SERPL-MCNC: 19.8 MG/DL (ref 9.8–20.1)
BUN SERPL-MCNC: 19.8 MG/DL (ref 9.8–20.1)
BUN SERPL-MCNC: 20 MG/DL (ref 9.8–20.1)
BUN SERPL-MCNC: 20.4 MG/DL (ref 9.8–20.1)
BUN SERPL-MCNC: 20.7 MG/DL (ref 9.8–20.1)
BUN SERPL-MCNC: 21.6 MG/DL (ref 9.8–20.1)
BUN SERPL-MCNC: 21.8 MG/DL (ref 9.8–20.1)
BUN SERPL-MCNC: 22 MG/DL (ref 9.8–20.1)
BUN SERPL-MCNC: 22.6 MG/DL (ref 9.8–20.1)
BUN SERPL-MCNC: 22.7 MG/DL (ref 9.8–20.1)
BUN SERPL-MCNC: 23.2 MG/DL (ref 9.8–20.1)
BUN SERPL-MCNC: 23.9 MG/DL (ref 9.8–20.1)
BUN SERPL-MCNC: 24.2 MG/DL (ref 9.8–20.1)
BUN SERPL-MCNC: 24.3 MG/DL (ref 9.8–20.1)
BUN SERPL-MCNC: 24.6 MG/DL (ref 9.8–20.1)
BUN SERPL-MCNC: 37.4 MG/DL (ref 9.8–20.1)
BURR CELLS (OLG): ABNORMAL
BURR CELLS (OLG): ABNORMAL
BURR CELLS (OLG): SLIGHT
BURR CELLS (OLG): SLIGHT
CALCIUM SERPL-MCNC: 8.5 MG/DL (ref 8.4–10.2)
CALCIUM SERPL-MCNC: 8.7 MG/DL (ref 8.4–10.2)
CALCIUM SERPL-MCNC: 8.8 MG/DL (ref 8.4–10.2)
CALCIUM SERPL-MCNC: 8.9 MG/DL (ref 8.4–10.2)
CALCIUM SERPL-MCNC: 8.9 MG/DL (ref 8.7–10.5)
CALCIUM SERPL-MCNC: 8.9 MG/DL (ref 8.7–10.5)
CALCIUM SERPL-MCNC: 9 MG/DL (ref 8.4–10.2)
CALCIUM SERPL-MCNC: 9 MG/DL (ref 8.4–10.2)
CALCIUM SERPL-MCNC: 9.1 MG/DL (ref 8.7–10.5)
CALCIUM SERPL-MCNC: 9.2 MG/DL (ref 8.4–10.2)
CALCIUM SERPL-MCNC: 9.2 MG/DL (ref 8.7–10.5)
CALCIUM SERPL-MCNC: 9.3 MG/DL (ref 8.4–10.2)
CALCIUM SERPL-MCNC: 9.3 MG/DL (ref 8.4–10.2)
CAOX CRY URNS QL MICRO: ABNORMAL /HPF
CHLORIDE SERPL-SCNC: 105 MMOL/L (ref 98–111)
CHLORIDE SERPL-SCNC: 105 MMOL/L (ref 98–111)
CHLORIDE SERPL-SCNC: 106 MMOL/L (ref 98–111)
CHLORIDE SERPL-SCNC: 107 MMOL/L (ref 98–111)
CHLORIDE SERPL-SCNC: 108 MMOL/L (ref 98–111)
CHLORIDE SERPL-SCNC: 109 MMOL/L (ref 98–111)
CHLORIDE SERPL-SCNC: 110 MMOL/L (ref 98–111)
CHLORIDE SERPL-SCNC: 111 MMOL/L (ref 98–111)
CHLORIDE SERPL-SCNC: 112 MMOL/L (ref 98–111)
CO2 SERPL-SCNC: 21 MMOL/L (ref 23–31)
CO2 SERPL-SCNC: 21 MMOL/L (ref 23–31)
CO2 SERPL-SCNC: 23 MMOL/L (ref 23–31)
CO2 SERPL-SCNC: 24 MMOL/L (ref 23–31)
CO2 SERPL-SCNC: 25 MMOL/L (ref 23–31)
CO2 SERPL-SCNC: 26 MMOL/L (ref 23–31)
CO2 SERPL-SCNC: 26 MMOL/L (ref 23–31)
CO2 SERPL-SCNC: 27 MMOL/L (ref 23–31)
CO2 SERPL-SCNC: 28 MMOL/L (ref 23–31)
CO2 SERPL-SCNC: 28 MMOL/L (ref 23–31)
COLOR UR AUTO: ABNORMAL
COLOR UR AUTO: YELLOW
COLOR UR: YELLOW
CREAT SERPL-MCNC: 0.68 MG/DL (ref 0.55–1.02)
CREAT SERPL-MCNC: 0.69 MG/DL (ref 0.55–1.02)
CREAT SERPL-MCNC: 0.72 MG/DL (ref 0.55–1.02)
CREAT SERPL-MCNC: 0.72 MG/DL (ref 0.55–1.02)
CREAT SERPL-MCNC: 0.74 MG/DL (ref 0.55–1.02)
CREAT SERPL-MCNC: 0.77 MG/DL (ref 0.57–1.11)
CREAT SERPL-MCNC: 0.78 MG/DL (ref 0.55–1.02)
CREAT SERPL-MCNC: 0.79 MG/DL (ref 0.55–1.02)
CREAT SERPL-MCNC: 0.79 MG/DL (ref 0.55–1.02)
CREAT SERPL-MCNC: 0.8 MG/DL (ref 0.55–1.02)
CREAT SERPL-MCNC: 0.8 MG/DL (ref 0.55–1.02)
CREAT SERPL-MCNC: 0.81 MG/DL (ref 0.55–1.02)
CREAT SERPL-MCNC: 0.81 MG/DL (ref 0.55–1.02)
CREAT SERPL-MCNC: 0.82 MG/DL (ref 0.55–1.02)
CREAT SERPL-MCNC: 0.83 MG/DL (ref 0.55–1.02)
CREAT SERPL-MCNC: 0.84 MG/DL (ref 0.55–1.02)
CREAT SERPL-MCNC: 0.85 MG/DL (ref 0.55–1.02)
CREAT SERPL-MCNC: 0.87 MG/DL (ref 0.55–1.02)
CREAT SERPL-MCNC: 0.96 MG/DL (ref 0.55–1.02)
CREAT SERPL-MCNC: 1.37 MG/DL (ref 0.55–1.02)
CREAT/UREA NIT SERPL: 29
CREAT/UREA NIT SERPL: 30
CREAT/UREA NIT SERPL: 31
CROSSMATCH INTERPRETATION: NORMAL
CV ECHO LV RWT: 0.86 CM
DHEA SERPL-MCNC: NORMAL
DISPENSE STATUS: NORMAL
DO MICRO?: YES
DOP CALC AO PEAK VEL: 3.57 M/S
DOP CALC AO VTI: 76 CM
DOP CALC LVOT AREA: 3.1 CM2
DOP CALC LVOT DIAMETER: 2 CM
DOP CALC LVOT PEAK VEL: 0.92 M/S
DOP CALC LVOT STROKE VOLUME: 64.06 CM3
DOP CALCLVOT PEAK VEL VTI: 20.4 CM
E WAVE DECELERATION TIME: 185 MSEC
E/A RATIO: 0.96
E/E' RATIO: 15 M/S
ECHO LV POSTERIOR WALL: 1.36 CM (ref 0.6–1.1)
EJECTION FRACTION: 56 %
ELLIPTOCYTOSIS (OHS): ABNORMAL
EOSINOPHIL # BLD AUTO: 0.1 10*3/UL (ref 0–0.9)
EOSINOPHIL # BLD AUTO: 0.1 10*3/UL (ref 0–0.9)
EOSINOPHIL # BLD AUTO: 0.1 X10(3)/MCL (ref 0–0.9)
EOSINOPHIL # BLD AUTO: 0.1 X10(3)/MCL (ref 0–0.9)
EOSINOPHIL # BLD AUTO: 0.11 X10(3)/MCL (ref 0–0.9)
EOSINOPHIL # BLD AUTO: 0.12 X10(3)/MCL (ref 0–0.9)
EOSINOPHIL # BLD AUTO: 0.12 X10(3)/MCL (ref 0–0.9)
EOSINOPHIL # BLD AUTO: 0.14 X10(3)/MCL (ref 0–0.9)
EOSINOPHIL # BLD AUTO: 0.15 10*3/UL (ref 0–0.9)
EOSINOPHIL # BLD AUTO: 0.15 X10(3)/MCL (ref 0–0.9)
EOSINOPHIL # BLD AUTO: 0.18 X10(3)/MCL (ref 0–0.9)
EOSINOPHIL # BLD AUTO: 0.19 X10(3)/MCL (ref 0–0.9)
EOSINOPHIL # BLD AUTO: 0.2 10*3/UL (ref 0–0.9)
EOSINOPHIL # BLD AUTO: 0.4 10*3/UL (ref 0–0.9)
EOSINOPHIL NFR BLD AUTO: 10.6 %
EOSINOPHIL NFR BLD AUTO: 3 %
EOSINOPHIL NFR BLD AUTO: 3.2 %
EOSINOPHIL NFR BLD AUTO: 3.7 %
EOSINOPHIL NFR BLD AUTO: 3.8 %
EOSINOPHIL NFR BLD AUTO: 4 %
EOSINOPHIL NFR BLD AUTO: 4 %
EOSINOPHIL NFR BLD AUTO: 4.1 %
EOSINOPHIL NFR BLD AUTO: 4.2 %
EOSINOPHIL NFR BLD AUTO: 4.4 %
EOSINOPHIL NFR BLD AUTO: 4.4 % (ref 0–6.4)
EOSINOPHIL NFR BLD AUTO: 5.4 %
EOSINOPHIL NFR BLD AUTO: 5.7 %
EOSINOPHIL NFR BLD AUTO: 6.3 %
EOSINOPHIL NFR BLD AUTO: 6.6 %
EOSINOPHIL NFR BLD AUTO: 7.2 %
EOSINOPHIL NFR BLD MANUAL: 0.03 X10(3)/MCL (ref 0–0.9)
EOSINOPHIL NFR BLD MANUAL: 0.05 X10(3)/MCL (ref 0–0.9)
EOSINOPHIL NFR BLD MANUAL: 0.08 X10(3)/MCL (ref 0–0.9)
EOSINOPHIL NFR BLD MANUAL: 0.08 X10(3)/MCL (ref 0–0.9)
EOSINOPHIL NFR BLD MANUAL: 0.09 X10(3)/MCL (ref 0–0.9)
EOSINOPHIL NFR BLD MANUAL: 0.12 X10(3)/MCL (ref 0–0.9)
EOSINOPHIL NFR BLD MANUAL: 0.2 X10(3)/MCL (ref 0–0.9)
EOSINOPHIL NFR BLD MANUAL: 1 %
EOSINOPHIL NFR BLD MANUAL: 2 % (ref 0–8)
EOSINOPHIL NFR BLD MANUAL: 3 %
EOSINOPHIL NFR BLD MANUAL: 3 % (ref 0–8)
EOSINOPHIL NFR BLD MANUAL: 3 % (ref 0–8)
EOSINOPHIL NFR BLD MANUAL: 5 %
EOSINOPHIL NFR BLD MANUAL: 5 %
EOSINOPHIL NFR BLD MANUAL: 6 % (ref 0–8)
EPO SERPL-ACNC: 181 MIU/ML (ref 2.6–18.5)
ERYTHROCYTE [DISTWIDTH] IN BLOOD BY AUTOMATED COUNT: 18 % (ref 11.5–17)
ERYTHROCYTE [DISTWIDTH] IN BLOOD BY AUTOMATED COUNT: 18.2 % (ref 11.5–17)
ERYTHROCYTE [DISTWIDTH] IN BLOOD BY AUTOMATED COUNT: 18.5 % (ref 11.5–17)
ERYTHROCYTE [DISTWIDTH] IN BLOOD BY AUTOMATED COUNT: 18.6 % (ref 11.5–17)
ERYTHROCYTE [DISTWIDTH] IN BLOOD BY AUTOMATED COUNT: 18.7 % (ref 11.5–17)
ERYTHROCYTE [DISTWIDTH] IN BLOOD BY AUTOMATED COUNT: 18.9 % (ref 11.5–17)
ERYTHROCYTE [DISTWIDTH] IN BLOOD BY AUTOMATED COUNT: 19.2 % (ref 11.5–17)
ERYTHROCYTE [DISTWIDTH] IN BLOOD BY AUTOMATED COUNT: 19.2 % (ref 11.5–17)
ERYTHROCYTE [DISTWIDTH] IN BLOOD BY AUTOMATED COUNT: 19.5 % (ref 11.5–17)
ERYTHROCYTE [DISTWIDTH] IN BLOOD BY AUTOMATED COUNT: 19.6 % (ref 11.5–17)
ERYTHROCYTE [DISTWIDTH] IN BLOOD BY AUTOMATED COUNT: 19.9 % (ref 11.5–17)
ERYTHROCYTE [DISTWIDTH] IN BLOOD BY AUTOMATED COUNT: 20 % (ref 11.5–17)
ERYTHROCYTE [DISTWIDTH] IN BLOOD BY AUTOMATED COUNT: 20.4 % (ref 11.5–17)
ERYTHROCYTE [DISTWIDTH] IN BLOOD BY AUTOMATED COUNT: 20.5 % (ref 11.5–17)
ERYTHROCYTE [DISTWIDTH] IN BLOOD BY AUTOMATED COUNT: 20.8 % (ref 11.5–17)
ERYTHROCYTE [DISTWIDTH] IN BLOOD BY AUTOMATED COUNT: 20.9 % (ref 11.5–17)
ERYTHROCYTE [DISTWIDTH] IN BLOOD BY AUTOMATED COUNT: 20.9 % (ref 11.5–17)
ERYTHROCYTE [DISTWIDTH] IN BLOOD BY AUTOMATED COUNT: 21.5 % (ref 11.5–17)
ERYTHROCYTE [DISTWIDTH] IN BLOOD BY AUTOMATED COUNT: 22 % (ref 11.5–17)
ERYTHROCYTE [DISTWIDTH] IN BLOOD BY AUTOMATED COUNT: 22.1 % (ref 11.5–17)
ERYTHROCYTE [DISTWIDTH] IN BLOOD BY AUTOMATED COUNT: 22.2 % (ref 11.5–17)
ERYTHROCYTE [DISTWIDTH] IN BLOOD BY AUTOMATED COUNT: 23 % (ref 11.5–17)
ERYTHROCYTE [DISTWIDTH] IN BLOOD BY AUTOMATED COUNT: 23.5 % (ref 11.5–17)
ERYTHROCYTE [DISTWIDTH] IN BLOOD BY AUTOMATED COUNT: 23.6 % (ref 11.5–17)
ERYTHROCYTE [DISTWIDTH] IN BLOOD BY AUTOMATED COUNT: 23.9 % (ref 11.5–17)
ERYTHROCYTE [DISTWIDTH] IN BLOOD BY AUTOMATED COUNT: ABNORMAL %
ESTRIOL SERPL-MCNC: NORMAL NG/ML
ESTROGEN SERPL-MCNC: NORMAL PG/ML
FERRITIN SERPL-MCNC: 679.64 NG/ML (ref 4.63–204)
FERRITIN SERPL-MCNC: 723.46 NG/ML (ref 4.63–204)
FERRITIN SERPL-MCNC: 876.52 NG/ML (ref 4.63–204)
FOLATE SERPL-MCNC: 14.9 NG/ML (ref 7–31.4)
FOLATE SERPL-MCNC: 28.7 NG/ML (ref 7–31.4)
FRACTIONAL SHORTENING: 33 % (ref 28–44)
GFR SERPLBLD CREATININE-BSD FMLA CKD-EPI: 35 MLS/MIN/1.73/M2
GFR SERPLBLD CREATININE-BSD FMLA CKD-EPI: >60 MLS/MIN/1.73/M2
GIANT PLATELETS # (OHS): 6 MCL
GLOBULIN SER-MCNC: 2.2 GM/DL (ref 2.4–3.5)
GLOBULIN SER-MCNC: 2.3 GM/DL (ref 2.4–3.5)
GLOBULIN SER-MCNC: 2.4 G/DL (ref 2.4–3.5)
GLOBULIN SER-MCNC: 2.4 GM/DL (ref 2.4–3.5)
GLOBULIN SER-MCNC: 2.5 G/DL (ref 2.4–3.5)
GLOBULIN SER-MCNC: 2.5 G/DL (ref 2.4–3.5)
GLOBULIN SER-MCNC: 2.6 G/DL (ref 2.4–3.5)
GLOBULIN SER-MCNC: 2.6 GM/DL (ref 2.4–3.5)
GLOBULIN SER-MCNC: 2.6 GM/DL (ref 2.4–3.5)
GLOBULIN SER-MCNC: 2.7 G/DL (ref 2.4–3.5)
GLOBULIN SER-MCNC: 2.8 GM/DL (ref 2.4–3.5)
GLOBULIN SER-MCNC: 2.8 GM/DL (ref 2.4–3.5)
GLOBULIN SER-MCNC: 3 GM/DL (ref 2.4–3.5)
GLOBULIN SER-MCNC: 3.1 GM/DL (ref 2.4–3.5)
GLUCOSE (UA): NEGATIVE
GLUCOSE SERPL-MCNC: 101 MG/DL (ref 75–121)
GLUCOSE SERPL-MCNC: 101 MG/DL (ref 75–121)
GLUCOSE SERPL-MCNC: 102 MG/DL (ref 75–121)
GLUCOSE SERPL-MCNC: 104 MG/DL (ref 75–121)
GLUCOSE SERPL-MCNC: 108 MG/DL (ref 75–121)
GLUCOSE SERPL-MCNC: 108 MG/DL (ref 75–121)
GLUCOSE SERPL-MCNC: 109 MG/DL (ref 75–121)
GLUCOSE SERPL-MCNC: 111 MG/DL (ref 75–121)
GLUCOSE SERPL-MCNC: 114 MG/DL (ref 75–121)
GLUCOSE SERPL-MCNC: 121 MG/DL (ref 75–121)
GLUCOSE SERPL-MCNC: 123 MG/DL (ref 75–121)
GLUCOSE SERPL-MCNC: 124 MG/DL (ref 75–121)
GLUCOSE SERPL-MCNC: 82 MG/DL (ref 75–121)
GLUCOSE SERPL-MCNC: 85 MG/DL (ref 75–121)
GLUCOSE SERPL-MCNC: 90 MG/DL (ref 75–121)
GLUCOSE SERPL-MCNC: 91 MG/DL (ref 75–121)
GLUCOSE SERPL-MCNC: 92 MG/DL (ref 75–121)
GLUCOSE SERPL-MCNC: 97 MG/DL (ref 75–121)
GLUCOSE SERPL-MCNC: 98 MG/DL (ref 75–121)
GLUCOSE SERPL-MCNC: 98 MG/DL (ref 75–121)
GLUCOSE UR QL STRIP.AUTO: NEGATIVE
GLUCOSE UR QL STRIP.AUTO: NEGATIVE MG/DL
GLUCOSE UR QL STRIP.AUTO: NEGATIVE MG/DL
GRANULOCYTES NFR BLD MANUAL: 28 % (ref 47–80)
GROUP & RH: NORMAL
HCT VFR BLD AUTO: 20.3 % (ref 37–47)
HCT VFR BLD AUTO: 21.4 % (ref 37–47)
HCT VFR BLD AUTO: 21.6 % (ref 37–47)
HCT VFR BLD AUTO: 21.6 % (ref 37–47)
HCT VFR BLD AUTO: 21.7 % (ref 37–47)
HCT VFR BLD AUTO: 21.7 % (ref 37–47)
HCT VFR BLD AUTO: 22 % (ref 37–47)
HCT VFR BLD AUTO: 22.1 % (ref 37–47)
HCT VFR BLD AUTO: 22.3 % (ref 37–47)
HCT VFR BLD AUTO: 22.4 % (ref 37–47)
HCT VFR BLD AUTO: 22.5 % (ref 37–47)
HCT VFR BLD AUTO: 22.5 % (ref 37–47)
HCT VFR BLD AUTO: 23.1 % (ref 37–47)
HCT VFR BLD AUTO: 23.3 % (ref 37–47)
HCT VFR BLD AUTO: 24.6 % (ref 37–47)
HCT VFR BLD AUTO: 24.7 % (ref 37–47)
HCT VFR BLD AUTO: 25.2 % (ref 37–47)
HCT VFR BLD AUTO: 25.8 % (ref 37–47)
HCT VFR BLD AUTO: 26.1 % (ref 37–47)
HCT VFR BLD AUTO: 28.8 % (ref 37–47)
HCT VFR BLD AUTO: 29.9 % (ref 37–47)
HCT VFR BLD AUTO: 30.1 % (ref 37–47)
HCT VFR BLD AUTO: 32 % (ref 37–47)
HCT VFR BLD AUTO: 32.3 % (ref 37–47)
HCT VFR BLD AUTO: 34.8 % (ref 37–47)
HCT VFR BLD AUTO: 36.4 % (ref 37–47)
HEMATOLOGIST REVIEW: NORMAL
HEMOLYSIS INTERF INDEX SERPL-ACNC: 0
HEMOLYSIS INTERF INDEX SERPL-ACNC: 1
HEMOLYSIS INTERF INDEX SERPL-ACNC: 2
HGB BLD-MCNC: 10.1 G/DL (ref 12–16)
HGB BLD-MCNC: 10.1 GM/DL (ref 12–16)
HGB BLD-MCNC: 10.4 G/DL (ref 12–16)
HGB BLD-MCNC: 11 GM/DL (ref 12–16)
HGB BLD-MCNC: 12 GM/DL (ref 12–16)
HGB BLD-MCNC: 6.3 GM/DL (ref 12–16)
HGB BLD-MCNC: 6.7 GM/DL (ref 12–16)
HGB BLD-MCNC: 6.7 GM/DL (ref 12–16)
HGB BLD-MCNC: 6.9 GM/DL (ref 12–16)
HGB BLD-MCNC: 7 G/DL (ref 12–16)
HGB BLD-MCNC: 7 GM/DL (ref 12–16)
HGB BLD-MCNC: 7.1 G/DL (ref 12–16)
HGB BLD-MCNC: 7.1 GM/DL (ref 12–16)
HGB BLD-MCNC: 7.2 GM/DL (ref 12–16)
HGB BLD-MCNC: 7.3 GM/DL (ref 12–16)
HGB BLD-MCNC: 7.5 GM/DL (ref 12–16)
HGB BLD-MCNC: 7.5 GM/DL (ref 12–16)
HGB BLD-MCNC: 7.9 G/DL (ref 12–16)
HGB BLD-MCNC: 7.9 GM/DL (ref 12–16)
HGB BLD-MCNC: 8.1 GM/DL (ref 12–16)
HGB BLD-MCNC: 8.3 G/DL (ref 12–16)
HGB BLD-MCNC: 8.5 G/DL (ref 12–16)
HGB BLD-MCNC: 9.3 GM/DL (ref 12–16)
HGB BLD-MCNC: 9.8 GM/DL (ref 12–16)
HGB UR QL STRIP: NEGATIVE
ICTERIC INTERF INDEX SERPL-ACNC: 0
ICTERIC INTERF INDEX SERPL-ACNC: 1
IMM GRANULOCYTES # BLD AUTO: 0 X10(3)/MCL (ref 0–0.04)
IMM GRANULOCYTES # BLD AUTO: 0.01 X10(3)/MCL (ref 0–0.02)
IMM GRANULOCYTES # BLD AUTO: 0.01 X10(3)/MCL (ref 0–0.02)
IMM GRANULOCYTES # BLD AUTO: 0.03 X10(3)/MCL (ref 0–0.04)
IMM GRANULOCYTES # BLD AUTO: 0.04 X10(3)/MCL (ref 0–0.02)
IMM GRANULOCYTES # BLD AUTO: 0.04 X10(3)/MCL (ref 0–0.04)
IMM GRANULOCYTES # BLD AUTO: 0.05 X10(3)/MCL (ref 0–0.04)
IMM GRANULOCYTES # BLD AUTO: 0.05 X10(3)/MCL (ref 0–0.04)
IMM GRANULOCYTES # BLD AUTO: 0.06 X10(3)/MCL (ref 0–0.04)
IMM GRANULOCYTES # BLD AUTO: 0.09 10*3/UL (ref 0–0.02)
IMM GRANULOCYTES # BLD AUTO: 0.1 X10(3)/MCL (ref 0–0.04)
IMM GRANULOCYTES # BLD AUTO: 0.11 X10(3)/MCL (ref 0–0.04)
IMM GRANULOCYTES # BLD AUTO: 0.17 X10(3)/MCL (ref 0–0.04)
IMM GRANULOCYTES # BLD AUTO: 0.18 X10(3)/MCL (ref 0–0.04)
IMM GRANULOCYTES # BLD AUTO: 0.31 X10(3)/MCL (ref 0–0.04)
IMM GRANULOCYTES NFR BLD AUTO: 0 %
IMM GRANULOCYTES NFR BLD AUTO: 0.3 % (ref 0–0.43)
IMM GRANULOCYTES NFR BLD AUTO: 0.3 % (ref 0–0.43)
IMM GRANULOCYTES NFR BLD AUTO: 0.8 %
IMM GRANULOCYTES NFR BLD AUTO: 1.2 %
IMM GRANULOCYTES NFR BLD AUTO: 1.2 % (ref 0–0.43)
IMM GRANULOCYTES NFR BLD AUTO: 1.5 %
IMM GRANULOCYTES NFR BLD AUTO: 1.8 %
IMM GRANULOCYTES NFR BLD AUTO: 2.1 %
IMM GRANULOCYTES NFR BLD AUTO: 2.2 %
IMM GRANULOCYTES NFR BLD AUTO: 2.7 %
IMM GRANULOCYTES NFR BLD AUTO: 2.7 % (ref 0–0.43)
IMM GRANULOCYTES NFR BLD AUTO: 2.9 %
IMM GRANULOCYTES NFR BLD AUTO: 5 %
IMM GRANULOCYTES NFR BLD AUTO: 6.1 %
IMM GRANULOCYTES NFR BLD AUTO: 8.3 %
INDIRECT COOMBS GEL: NORMAL
INR BLD: 1.13 (ref 2–3)
INSTRUMENT WBC (OLG): 2.3 X10(3)/MCL
INSTRUMENT WBC (OLG): 2.8 X10(3)/MCL
INSTRUMENT WBC (OLG): 3.4 X10(3)/MCL
INSTRUMENT WBC (OLG): 4 X10(3)/MCL
INSULIN SERPL-ACNC: NORMAL U[IU]/ML
INTERVENTRICULAR SEPTUM: 1.29 CM (ref 0.6–1.1)
IRON SATN MFR SERPL: 51 % (ref 20–50)
IRON SATN MFR SERPL: 55 % (ref 20–50)
IRON SATN MFR SERPL: 64 % (ref 20–50)
IRON SATN MFR SERPL: 65 % (ref 20–50)
IRON SERPL-MCNC: 120 UG/DL (ref 50–170)
IRON SERPL-MCNC: 127 UG/DL (ref 50–170)
IRON SERPL-MCNC: 145 UG/DL (ref 50–170)
IRON SERPL-MCNC: 70 UG/DL (ref 50–170)
KETONES UR QL STRIP.AUTO: NEGATIVE
KETONES UR QL STRIP.AUTO: NEGATIVE MG/DL
KETONES UR QL STRIP.AUTO: NEGATIVE MG/DL
KETONES UR QL STRIP: NEGATIVE
LAB AP BM CBC: NORMAL
LAB AP BM PERIPHERAL SMEAR: NORMAL
LAB AP CLINICAL INFORMATION: NORMAL
LAB AP GROSS DESCRIPTION: NORMAL
LEFT ATRIUM SIZE: 3.6 CM
LEFT ATRIUM VOLUME INDEX MOD: 42 ML/M2
LEFT ATRIUM VOLUME MOD: 67.7 CM3
LEFT CCA DIST DIAS: 0 CM/S
LEFT CCA DIST SYS: 86 CM/S
LEFT CCA PROX DIAS: 0 CM/S
LEFT CCA PROX SYS: 85 CM/S
LEFT ECA DIAS: 0 CM/S
LEFT ECA SYS: 82 CM/S
LEFT ICA DIST DIAS: 20 CM/S
LEFT ICA DIST SYS: 115 CM/S
LEFT ICA MID DIAS: 16 CM/S
LEFT ICA MID SYS: 73 CM/S
LEFT ICA PROX DIAS: 21 CM/S
LEFT ICA PROX SYS: 92 CM/S
LEFT INTERNAL DIMENSION IN SYSTOLE: 2.13 CM (ref 2.1–4)
LEFT VENTRICLE DIASTOLIC VOLUME INDEX: 25.03 ML/M2
LEFT VENTRICLE DIASTOLIC VOLUME: 40.3 ML
LEFT VENTRICLE MASS INDEX: 86 G/M2
LEFT VENTRICLE SYSTOLIC VOLUME INDEX: 9.3 ML/M2
LEFT VENTRICLE SYSTOLIC VOLUME: 14.9 ML
LEFT VENTRICULAR INTERNAL DIMENSION IN DIASTOLE: 3.18 CM (ref 3.5–6)
LEFT VENTRICULAR MASS: 138.71 G
LEFT VERTEBRAL DIAS: 21 CM/S
LEFT VERTEBRAL SYS: 130 CM/S
LEUKOCYTE ESTERASE UR QL STRIP.AUTO: ABNORMAL UNIT/L
LEUKOCYTE ESTERASE UR QL STRIP.AUTO: ABNORMAL UNIT/L
LEUKOCYTE ESTERASE UR QL STRIP.AUTO: NORMAL
LEUKOCYTE ESTERASE UR QL STRIP: NORMAL
LIPASE SERPL-CCNC: 34 U/L
LIPEMIC INTERF INDEX SERPL-ACNC: 4
LIPEMIC INTERF INDEX SERPL-ACNC: 7
LIPEMIC INTERF INDEX SERPL-ACNC: <0
LV LATERAL E/E' RATIO: 11.25 M/S
LV SEPTAL E/E' RATIO: 22.5 M/S
LVOT MG: 2 MMHG
LVOT MV: 0.63 CM/S
LYMPH ABN # BLD MANUAL: 1 %
LYMPH ABN # BLD MANUAL: 2 %
LYMPHOCYTES # BLD AUTO: 1.3 10*3/UL (ref 0.6–4.6)
LYMPHOCYTES # BLD AUTO: 1.4 10*3/UL (ref 0.6–4.6)
LYMPHOCYTES # BLD AUTO: 1.4 X10(3)/MCL (ref 0.6–4.6)
LYMPHOCYTES # BLD AUTO: 1.4 X10(3)/MCL (ref 0.6–4.6)
LYMPHOCYTES # BLD AUTO: 1.47 X10(3)/MCL (ref 0.6–4.6)
LYMPHOCYTES # BLD AUTO: 1.52 X10(3)/MCL (ref 0.6–4.6)
LYMPHOCYTES # BLD AUTO: 1.6 10*3/UL (ref 0.6–4.6)
LYMPHOCYTES # BLD AUTO: 1.6 X10(3)/MCL (ref 0.6–4.6)
LYMPHOCYTES # BLD AUTO: 1.78 X10(3)/MCL (ref 0.6–4.6)
LYMPHOCYTES # BLD AUTO: 1.78 X10(3)/MCL (ref 0.6–4.6)
LYMPHOCYTES # BLD AUTO: 1.8 10*3/UL (ref 0.6–4.6)
LYMPHOCYTES # BLD AUTO: 1.8 10*3/UL (ref 0.6–4.6)
LYMPHOCYTES # BLD AUTO: 1.86 X10(3)/MCL (ref 0.6–4.6)
LYMPHOCYTES # BLD AUTO: 2.08 X10(3)/MCL (ref 0.6–4.6)
LYMPHOCYTES NFR BLD AUTO: 37.4 %
LYMPHOCYTES NFR BLD AUTO: 41 %
LYMPHOCYTES NFR BLD AUTO: 42 %
LYMPHOCYTES NFR BLD AUTO: 46.3 %
LYMPHOCYTES NFR BLD AUTO: 47.2 %
LYMPHOCYTES NFR BLD AUTO: 47.3 %
LYMPHOCYTES NFR BLD AUTO: 47.3 % (ref 16–44)
LYMPHOCYTES NFR BLD AUTO: 47.8 %
LYMPHOCYTES NFR BLD AUTO: 51 %
LYMPHOCYTES NFR BLD AUTO: 52.4 %
LYMPHOCYTES NFR BLD AUTO: 53.8 %
LYMPHOCYTES NFR BLD AUTO: 53.8 %
LYMPHOCYTES NFR BLD AUTO: 58.3 %
LYMPHOCYTES NFR BLD AUTO: 58.9 %
LYMPHOCYTES NFR BLD AUTO: 58.9 %
LYMPHOCYTES NFR BLD AUTO: 60.8 %
LYMPHOCYTES NFR BLD MANUAL: 1.08 X10(3)/MCL
LYMPHOCYTES NFR BLD MANUAL: 1.09 X10(3)/MCL
LYMPHOCYTES NFR BLD MANUAL: 1.13 X10(3)/MCL
LYMPHOCYTES NFR BLD MANUAL: 1.2 X10(3)/MCL
LYMPHOCYTES NFR BLD MANUAL: 1.29 X10(3)/MCL
LYMPHOCYTES NFR BLD MANUAL: 1.5 X10(3)/MCL
LYMPHOCYTES NFR BLD MANUAL: 1.71 X10(3)/MCL
LYMPHOCYTES NFR BLD MANUAL: 2.11 X10(3)/MCL
LYMPHOCYTES NFR BLD MANUAL: 23 % (ref 13–40)
LYMPHOCYTES NFR BLD MANUAL: 24 % (ref 13–40)
LYMPHOCYTES NFR BLD MANUAL: 27 %
LYMPHOCYTES NFR BLD MANUAL: 38 %
LYMPHOCYTES NFR BLD MANUAL: 39 %
LYMPHOCYTES NFR BLD MANUAL: 40 % (ref 13–40)
LYMPHOCYTES NFR BLD MANUAL: 48 % (ref 13–40)
LYMPHOCYTES NFR BLD MANUAL: 49 %
LYMPHOCYTES NFR BLD MANUAL: 50 % (ref 13–40)
LYMPHOCYTES NFR BLD MANUAL: 54 % (ref 13–40)
LYMPHOCYTES NFR BLD MANUAL: 61 % (ref 13–40)
MACROCYTES BLD QL SMEAR: ABNORMAL
MACROCYTES BLD QL SMEAR: SLIGHT
MAGNESIUM SERPL-MCNC: 2.1 MG/DL (ref 1.6–2.6)
MAGNESIUM SERPL-MCNC: 2.2 MG/DL (ref 1.6–2.6)
MANUAL DIFF? (OHS): NO
MANUAL DIFF? (OHS): YES
MCH RBC QN AUTO: 32.8 PG (ref 27–31)
MCH RBC QN AUTO: 33 PG (ref 27–31)
MCH RBC QN AUTO: 33.2 PG (ref 27–31)
MCH RBC QN AUTO: 33.3 PG (ref 27–31)
MCH RBC QN AUTO: 33.4 PG (ref 27–31)
MCH RBC QN AUTO: 33.7 PG (ref 27–31)
MCH RBC QN AUTO: 34.4 PG (ref 27–31)
MCH RBC QN AUTO: 34.5 PG (ref 27–31)
MCH RBC QN AUTO: 34.6 PG (ref 27–31)
MCH RBC QN AUTO: 34.8 PG (ref 27–31)
MCH RBC QN AUTO: 35 PG (ref 27–31)
MCH RBC QN AUTO: 35.3 PG (ref 27–31)
MCH RBC QN AUTO: 35.7 PG (ref 27–31)
MCH RBC QN AUTO: 35.8 PG (ref 27–31)
MCH RBC QN AUTO: 35.8 PG (ref 27–31)
MCH RBC QN AUTO: 36.1 PG (ref 27–31)
MCH RBC QN AUTO: 36.3 PG (ref 27–31)
MCH RBC QN AUTO: 36.4 PG (ref 27–31)
MCH RBC QN AUTO: 37.2 PG (ref 27–31)
MCH RBC QN AUTO: 37.3 PG (ref 27–31)
MCH RBC QN AUTO: 38.4 PG (ref 27–31)
MCHC RBC AUTO-ENTMCNC: 30.5 MG/DL (ref 33–36)
MCHC RBC AUTO-ENTMCNC: 30.9 MG/DL (ref 33–36)
MCHC RBC AUTO-ENTMCNC: 31 MG/DL (ref 33–36)
MCHC RBC AUTO-ENTMCNC: 31.1 MG/DL (ref 33–36)
MCHC RBC AUTO-ENTMCNC: 31.6 MG/DL (ref 33–36)
MCHC RBC AUTO-ENTMCNC: 31.6 MG/DL (ref 33–36)
MCHC RBC AUTO-ENTMCNC: 31.7 MG/DL (ref 33–36)
MCHC RBC AUTO-ENTMCNC: 31.9 MG/DL (ref 33–36)
MCHC RBC AUTO-ENTMCNC: 32 G/DL (ref 33–36)
MCHC RBC AUTO-ENTMCNC: 32.1 G/DL (ref 33–36)
MCHC RBC AUTO-ENTMCNC: 32.1 MG/DL (ref 33–36)
MCHC RBC AUTO-ENTMCNC: 32.1 MG/DL (ref 33–36)
MCHC RBC AUTO-ENTMCNC: 32.2 G/DL (ref 33–36)
MCHC RBC AUTO-ENTMCNC: 32.2 G/DL (ref 33–36)
MCHC RBC AUTO-ENTMCNC: 32.2 MG/DL (ref 33–36)
MCHC RBC AUTO-ENTMCNC: 32.3 G/DL (ref 33–36)
MCHC RBC AUTO-ENTMCNC: 32.3 MG/DL (ref 33–36)
MCHC RBC AUTO-ENTMCNC: 32.3 MG/DL (ref 33–36)
MCHC RBC AUTO-ENTMCNC: 32.4 MG/DL (ref 33–36)
MCHC RBC AUTO-ENTMCNC: 32.4 MG/DL (ref 33–36)
MCHC RBC AUTO-ENTMCNC: 32.5 MG/DL (ref 33–36)
MCHC RBC AUTO-ENTMCNC: 32.6 G/DL (ref 33–36)
MCHC RBC AUTO-ENTMCNC: 32.6 MG/DL (ref 33–36)
MCHC RBC AUTO-ENTMCNC: 32.7 MG/DL (ref 33–36)
MCHC RBC AUTO-ENTMCNC: 33 MG/DL (ref 33–36)
MCHC RBC AUTO-ENTMCNC: 33.8 G/DL (ref 33–36)
MCV RBC AUTO: 101.4 FL (ref 80–94)
MCV RBC AUTO: 102.3 FL (ref 80–94)
MCV RBC AUTO: 102.4 FL (ref 80–94)
MCV RBC AUTO: 102.7 FL (ref 80–94)
MCV RBC AUTO: 103.9 FL (ref 80–94)
MCV RBC AUTO: 106.6 FL (ref 80–94)
MCV RBC AUTO: 106.7 FL (ref 80–94)
MCV RBC AUTO: 106.7 FL (ref 80–94)
MCV RBC AUTO: 107.5 FL (ref 80–94)
MCV RBC AUTO: 108 FL (ref 80–94)
MCV RBC AUTO: 108 FL (ref 80–94)
MCV RBC AUTO: 108.2 FL (ref 80–94)
MCV RBC AUTO: 108.7 FL (ref 80–94)
MCV RBC AUTO: 108.8 FL (ref 80–94)
MCV RBC AUTO: 109.6 FL (ref 80–94)
MCV RBC AUTO: 110.3 FL (ref 80–94)
MCV RBC AUTO: 110.3 FL (ref 80–94)
MCV RBC AUTO: 111 FL (ref 80–94)
MCV RBC AUTO: 111.5 FL (ref 80–94)
MCV RBC AUTO: 112.2 FL (ref 80–94)
MCV RBC AUTO: 113.2 FL (ref 80–94)
MCV RBC AUTO: 114.2 FL (ref 80–94)
MCV RBC AUTO: 115.4 FL (ref 80–94)
MCV RBC AUTO: 116.5 FL (ref 80–94)
MCV RBC AUTO: 117.6 FL (ref 80–94)
MCV RBC AUTO: 119.5 FL (ref 80–94)
METAMYELOCYTES NFR BLD MANUAL: 1 %
METAMYELOCYTES NFR BLD MANUAL: 2 %
METAMYELOCYTES NFR BLD MANUAL: 3 %
MICROCYTES BLD QL SMEAR: ABNORMAL
MICROCYTES BLD QL SMEAR: ABNORMAL
MICROCYTES BLD QL SMEAR: SLIGHT
MONOCYTES # BLD AUTO: 0.15 X10(3)/MCL (ref 0.1–1.3)
MONOCYTES # BLD AUTO: 0.15 X10(3)/MCL (ref 0.1–1.3)
MONOCYTES # BLD AUTO: 0.2 10*3/UL (ref 0.1–1.3)
MONOCYTES # BLD AUTO: 0.2 X10(3)/MCL (ref 0.1–1.3)
MONOCYTES # BLD AUTO: 0.21 X10(3)/MCL (ref 0.1–1.3)
MONOCYTES # BLD AUTO: 0.21 X10(3)/MCL (ref 0.1–1.3)
MONOCYTES # BLD AUTO: 0.22 X10(3)/MCL (ref 0.1–1.3)
MONOCYTES # BLD AUTO: 0.23 X10(3)/MCL (ref 0.1–1.3)
MONOCYTES # BLD AUTO: 0.24 X10(3)/MCL (ref 0.1–1.3)
MONOCYTES # BLD AUTO: 0.27 X10(3)/MCL (ref 0.1–1.3)
MONOCYTES # BLD AUTO: 0.28 10*3/UL (ref 0.1–1.3)
MONOCYTES # BLD AUTO: 0.3 10*3/UL (ref 0.1–1.3)
MONOCYTES # BLD AUTO: 0.4 10*3/UL (ref 0.1–1.3)
MONOCYTES NFR BLD AUTO: 11.5 %
MONOCYTES NFR BLD AUTO: 5.6 %
MONOCYTES NFR BLD AUTO: 5.7 %
MONOCYTES NFR BLD AUTO: 6 %
MONOCYTES NFR BLD AUTO: 6 %
MONOCYTES NFR BLD AUTO: 6.3 %
MONOCYTES NFR BLD AUTO: 6.8 %
MONOCYTES NFR BLD AUTO: 6.8 %
MONOCYTES NFR BLD AUTO: 7 %
MONOCYTES NFR BLD AUTO: 7.3 %
MONOCYTES NFR BLD AUTO: 7.5 %
MONOCYTES NFR BLD AUTO: 7.6 %
MONOCYTES NFR BLD AUTO: 7.7 %
MONOCYTES NFR BLD AUTO: 8 %
MONOCYTES NFR BLD AUTO: 8.2 %
MONOCYTES NFR BLD AUTO: 8.3 % (ref 4–12.1)
MONOCYTES NFR BLD MANUAL: 0.02 X10(3)/MCL (ref 0.1–1.3)
MONOCYTES NFR BLD MANUAL: 0.04 X10(3)/MCL (ref 0.1–1.3)
MONOCYTES NFR BLD MANUAL: 0.08 X10(3)/MCL (ref 0.1–1.3)
MONOCYTES NFR BLD MANUAL: 0.1 X10(3)/MCL (ref 0.1–1.3)
MONOCYTES NFR BLD MANUAL: 0.14 X10(3)/MCL (ref 0.1–1.3)
MONOCYTES NFR BLD MANUAL: 0.15 X10(3)/MCL (ref 0.1–1.3)
MONOCYTES NFR BLD MANUAL: 0.21 X10(3)/MCL (ref 0.1–1.3)
MONOCYTES NFR BLD MANUAL: 0.36 X10(3)/MCL (ref 0.1–1.3)
MONOCYTES NFR BLD MANUAL: 1 %
MONOCYTES NFR BLD MANUAL: 1 % (ref 2–11)
MONOCYTES NFR BLD MANUAL: 3 %
MONOCYTES NFR BLD MANUAL: 3 %
MONOCYTES NFR BLD MANUAL: 4 % (ref 2–11)
MONOCYTES NFR BLD MANUAL: 4 % (ref 2–11)
MONOCYTES NFR BLD MANUAL: 5 % (ref 2–11)
MONOCYTES NFR BLD MANUAL: 6 % (ref 2–11)
MONOCYTES NFR BLD MANUAL: 7 % (ref 2–11)
MONOCYTES NFR BLD MANUAL: 8 % (ref 2–11)
MONOCYTES NFR BLD MANUAL: 9 %
MV PEAK A VEL: 0.94 M/S
MV PEAK E VEL: 0.9 M/S
MYELOCYTES NFR BLD MANUAL: 1 %
MYELOCYTES NFR BLD MANUAL: 1 %
MYELOCYTES NFR BLD MANUAL: 2 %
NEUTROPHILS # BLD AUTO: 0.5 X10(3)/MCL (ref 2.1–9.2)
NEUTROPHILS # BLD AUTO: 0.7 X10(3)/MCL (ref 2.1–9.2)
NEUTROPHILS # BLD AUTO: 0.8 X10(3)/MCL (ref 2.1–9.2)
NEUTROPHILS # BLD AUTO: 0.8 X10(3)/MCL (ref 2.1–9.2)
NEUTROPHILS # BLD AUTO: 0.9 X10(3)/MCL (ref 2.1–9.2)
NEUTROPHILS # BLD AUTO: 1 10*3/UL (ref 2.1–9.2)
NEUTROPHILS # BLD AUTO: 1 X10(3)/MCL (ref 2.1–9.2)
NEUTROPHILS # BLD AUTO: 1 X10(3)/MCL (ref 2.1–9.2)
NEUTROPHILS # BLD AUTO: 1.1 X10(3)/MCL (ref 2.1–9.2)
NEUTROPHILS # BLD AUTO: 1.2 10*3/UL (ref 2.1–9.2)
NEUTROPHILS # BLD AUTO: 1.52 10*3/UL (ref 2.1–9.2)
NEUTROPHILS # BLD AUTO: 1.7 X10(3)/MCL (ref 2.1–9.2)
NEUTROPHILS NFR BLD AUTO: 21.6 %
NEUTROPHILS NFR BLD AUTO: 24.6 %
NEUTROPHILS NFR BLD AUTO: 26.5 %
NEUTROPHILS NFR BLD AUTO: 26.9 %
NEUTROPHILS NFR BLD AUTO: 29.7 %
NEUTROPHILS NFR BLD AUTO: 30.2 %
NEUTROPHILS NFR BLD AUTO: 30.3 %
NEUTROPHILS NFR BLD AUTO: 30.8 %
NEUTROPHILS NFR BLD AUTO: 32.4 %
NEUTROPHILS NFR BLD AUTO: 32.8 %
NEUTROPHILS NFR BLD AUTO: 33.1 %
NEUTROPHILS NFR BLD AUTO: 34.6 %
NEUTROPHILS NFR BLD AUTO: 34.7 %
NEUTROPHILS NFR BLD AUTO: 35.5 % (ref 43–73)
NEUTROPHILS NFR BLD AUTO: 45 %
NEUTROPHILS NFR BLD AUTO: 45.3 %
NEUTROPHILS NFR BLD MANUAL: 24 % (ref 47–80)
NEUTROPHILS NFR BLD MANUAL: 37 % (ref 47–80)
NEUTROPHILS NFR BLD MANUAL: 42 % (ref 47–80)
NEUTROPHILS NFR BLD MANUAL: 43 %
NEUTROPHILS NFR BLD MANUAL: 43 % (ref 47–80)
NEUTROPHILS NFR BLD MANUAL: 53 %
NEUTROPHILS NFR BLD MANUAL: 55 %
NEUTROPHILS NFR BLD MANUAL: 56 % (ref 47–80)
NEUTROPHILS NFR BLD MANUAL: 57 % (ref 47–80)
NEUTROPHILS NFR BLD MANUAL: 58 %
NEUTS BAND NFR BLD MANUAL: 1 % (ref 0–11)
NEUTS BAND NFR BLD MANUAL: 28 % (ref 6–10)
NEUTS BAND NFR BLD MANUAL: 5 % (ref 0–11)
NEUTS BAND NFR BLD MANUAL: 6 % (ref 0–11)
NITRITE UR QL STRIP.AUTO: NEGATIVE
NITRITE UR QL STRIP.AUTO: POSITIVE
NITRITE UR QL STRIP: NEGATIVE
NRBC BLD AUTO-RTO: 0 %
NRBC BLD AUTO-RTO: 0 % (ref 0–0.2)
NRBC BLD MANUAL-RTO: 1 %
OHS CV CAROTID RIGHT ICA EDV HIGHEST: 23
OHS CV CAROTID ULTRASOUND LEFT ICA/CCA RATIO: 1.34
OHS CV CAROTID ULTRASOUND RIGHT ICA/CCA RATIO: 1.36
OHS CV PV CAROTID LEFT HIGHEST CCA: 86
OHS CV PV CAROTID LEFT HIGHEST ICA: 115
OHS CV PV CAROTID RIGHT HIGHEST CCA: 84
OHS CV PV CAROTID RIGHT HIGHEST ICA: 114
OHS CV US CAROTID LEFT HIGHEST EDV: 21
OVALOCYTES (OLG): ABNORMAL
OVALOCYTES (OLG): ABNORMAL
PH UR STRIP.AUTO: 6 [PH]
PH UR STRIP.AUTO: 7 [PH]
PH UR STRIP: 7 [PH] (ref 5–7)
PHOSPHATE SERPL-MCNC: 2.8 MG/DL (ref 2.3–4.7)
PHOSPHATE SERPL-MCNC: 3.8 MG/DL (ref 2.3–4.7)
PISA AR MAX VEL: 3.42 M/S
PISA TR MAX VEL: 2.52 M/S
PLATELET # BLD AUTO: 152 X10(3)/MCL (ref 130–400)
PLATELET # BLD AUTO: 180 X10(3)/MCL (ref 130–400)
PLATELET # BLD AUTO: 184 X10(3)/MCL (ref 130–400)
PLATELET # BLD AUTO: 187 X10(3)/MCL (ref 130–400)
PLATELET # BLD AUTO: 188 X10(3)/MCL (ref 130–400)
PLATELET # BLD AUTO: 191 X10(3)/MCL (ref 130–400)
PLATELET # BLD AUTO: 193 X10(3)/MCL (ref 130–400)
PLATELET # BLD AUTO: 202 X10(3)/MCL (ref 130–400)
PLATELET # BLD AUTO: 203 X10(3)/MCL (ref 130–400)
PLATELET # BLD AUTO: 208 X10(3)/MCL (ref 130–400)
PLATELET # BLD AUTO: 209 10*3/UL (ref 130–400)
PLATELET # BLD AUTO: 217 X10(3)/MCL (ref 130–400)
PLATELET # BLD AUTO: 231 X10(3)/MCL (ref 130–400)
PLATELET # BLD AUTO: 234 X10(3)/MCL (ref 130–400)
PLATELET # BLD AUTO: 237 X10(3)/MCL (ref 130–400)
PLATELET # BLD AUTO: 238 10*3/UL (ref 130–400)
PLATELET # BLD AUTO: 238 X10(3)/MCL (ref 130–400)
PLATELET # BLD AUTO: 241 10*3/UL (ref 130–400)
PLATELET # BLD AUTO: 241 X10(3)/MCL (ref 130–400)
PLATELET # BLD AUTO: 245 10*3/UL (ref 130–400)
PLATELET # BLD AUTO: 253 10*3/UL (ref 130–400)
PLATELET # BLD AUTO: 254 X10(3)/MCL (ref 130–400)
PLATELET # BLD AUTO: 260 X10(3)/MCL (ref 130–400)
PLATELET # BLD AUTO: 265 X10(3)/MCL (ref 130–400)
PLATELET # BLD AUTO: 292 10*3/UL (ref 130–400)
PLATELET # BLD AUTO: 325 10*3/UL (ref 130–400)
PLATELET # BLD EST: ADEQUATE 10*3/UL
PLATELET # BLD EST: NORMAL 10*3/UL
PLATELETS.RETICULATED NFR BLD AUTO: 11.2 % (ref 0.7–8.1)
PLATELETS.RETICULATED NFR BLD AUTO: 13.5 % (ref 0.9–11.2)
PLATELETS.RETICULATED NFR BLD AUTO: 14.3 % (ref 0.9–11.2)
PLATELETS.RETICULATED NFR BLD AUTO: 14.5 % (ref 0.9–11.2)
PMV BLD AUTO: 11.8 FL (ref 9.4–12.4)
PMV BLD AUTO: 11.9 FL (ref 7.4–10.4)
PMV BLD AUTO: 12 FL (ref 9.4–12.4)
PMV BLD AUTO: 12.1 FL (ref 7.4–10.4)
PMV BLD AUTO: 12.1 FL (ref 7.4–10.4)
PMV BLD AUTO: 12.2 FL (ref 9.4–12.4)
PMV BLD AUTO: 12.5 FL (ref 9.4–12.4)
PMV BLD AUTO: 12.6 FL (ref 7.4–10.4)
PMV BLD AUTO: 12.6 FL (ref 9.4–12.4)
PMV BLD AUTO: 12.7 FL (ref 9.4–12.4)
PMV BLD AUTO: 12.7 FL (ref 9.4–12.4)
PMV BLD AUTO: 12.8 FL (ref 7.4–10.4)
PMV BLD AUTO: 12.8 FL (ref 9.4–12.4)
PMV BLD AUTO: 12.9 FL (ref 7.4–10.4)
PMV BLD AUTO: 12.9 FL (ref 7.4–10.4)
PMV BLD AUTO: 13 FL (ref 7.4–10.4)
PMV BLD AUTO: 13 FL (ref 9.4–12.4)
PMV BLD AUTO: 13.2 FL (ref 7.4–10.4)
PMV BLD AUTO: 13.3 FL (ref 7.4–10.4)
PMV BLD AUTO: 13.4 FL (ref 7.4–10.4)
PMV BLD AUTO: 13.5 FL (ref 7.4–10.4)
PMV BLD AUTO: 13.6 FL (ref 7.4–10.4)
PMV BLD AUTO: 13.7 FL (ref 7.4–10.4)
POIKILOCYTOSIS BLD QL SMEAR: ABNORMAL
POLYCHROMASIA BLD QL SMEAR: ABNORMAL
POLYCHROMASIA BLD QL SMEAR: ABNORMAL
POLYCHROMASIA BLD QL SMEAR: SLIGHT
POLYCHROMASIA BLD QL SMEAR: SLIGHT
POTASSIUM SERPL-SCNC: 3.8 MMOL/L (ref 3.5–5.1)
POTASSIUM SERPL-SCNC: 4 MMOL/L (ref 3.5–5.1)
POTASSIUM SERPL-SCNC: 4 MMOL/L (ref 3.5–5.1)
POTASSIUM SERPL-SCNC: 4.1 MMOL/L (ref 3.5–5.1)
POTASSIUM SERPL-SCNC: 4.2 MMOL/L (ref 3.5–5.1)
POTASSIUM SERPL-SCNC: 4.2 MMOL/L (ref 3.5–5.1)
POTASSIUM SERPL-SCNC: 4.3 MMOL/L (ref 3.5–5.1)
POTASSIUM SERPL-SCNC: 4.3 MMOL/L (ref 3.5–5.1)
POTASSIUM SERPL-SCNC: 4.4 MMOL/L (ref 3.5–5.1)
POTASSIUM SERPL-SCNC: 4.5 MMOL/L (ref 3.5–5.1)
POTASSIUM SERPL-SCNC: 4.6 MMOL/L (ref 3.5–5.1)
POTASSIUM SERPL-SCNC: 4.6 MMOL/L (ref 3.5–5.1)
POTASSIUM SERPL-SCNC: 4.7 MMOL/L (ref 3.5–5.1)
PROLYMPHOCYTES # BLD MANUAL: 1 %
PROMYELOCYTES # BLD MANUAL: 1 %
PROT SERPL-MCNC: 5.9 GM/DL (ref 5.8–7.6)
PROT SERPL-MCNC: 6 GM/DL (ref 5.8–7.6)
PROT SERPL-MCNC: 6.1 GM/DL (ref 5.8–7.6)
PROT SERPL-MCNC: 6.1 GM/DL (ref 5.8–7.6)
PROT SERPL-MCNC: 6.2 GM/DL (ref 5.8–7.6)
PROT SERPL-MCNC: 6.3 G/DL (ref 5.8–7.6)
PROT SERPL-MCNC: 6.5 G/DL (ref 5.8–7.6)
PROT SERPL-MCNC: 6.5 GM/DL (ref 5.8–7.6)
PROT SERPL-MCNC: 6.6 GM/DL (ref 5.8–7.6)
PROT SERPL-MCNC: 6.7 G/DL (ref 5.8–7.6)
PROT UR QL STRIP.AUTO: NEGATIVE
PROT UR QL STRIP.AUTO: NEGATIVE MG/DL
PROT UR QL STRIP.AUTO: NEGATIVE MG/DL
PROT UR QL STRIP: NEGATIVE
PROTHROMBIN TIME: 14.2 SECONDS (ref 11.7–14.5)
PV PEAK VELOCITY: 1.01 CM/S
RA PRESSURE: 8 MMHG
RBC # BLD AUTO: 1.81 X10(6)/MCL (ref 4.2–5.4)
RBC # BLD AUTO: 1.85 10*6/UL (ref 4.2–5.4)
RBC # BLD AUTO: 1.87 X10(6)/MCL (ref 4.2–5.4)
RBC # BLD AUTO: 1.88 10*6/UL (ref 4.2–5.4)
RBC # BLD AUTO: 1.9 X10(6)/MCL (ref 4.2–5.4)
RBC # BLD AUTO: 1.94 X10(6)/MCL (ref 4.2–5.4)
RBC # BLD AUTO: 1.97 X10(6)/MCL (ref 4.2–5.4)
RBC # BLD AUTO: 2 X10(6)/MCL (ref 4.2–5.4)
RBC # BLD AUTO: 2.04 X10(6)/MCL (ref 4.2–5.4)
RBC # BLD AUTO: 2.06 X10(6)/MCL (ref 4.2–5.4)
RBC # BLD AUTO: 2.1 X10(6)/MCL (ref 4.2–5.4)
RBC # BLD AUTO: 2.11 X10(6)/MCL (ref 4.2–5.4)
RBC # BLD AUTO: 2.12 10*6/UL (ref 4.2–5.4)
RBC # BLD AUTO: 2.18 X10(6)/MCL (ref 4.2–5.4)
RBC # BLD AUTO: 2.25 X10(6)/MCL (ref 4.2–5.4)
RBC # BLD AUTO: 2.26 X10(6)/MCL (ref 4.2–5.4)
RBC # BLD AUTO: 2.28 10*6/UL (ref 4.2–5.4)
RBC # BLD AUTO: 2.33 X10(6)/MCL (ref 4.2–5.4)
RBC # BLD AUTO: 2.34 10*6/UL (ref 4.2–5.4)
RBC # BLD AUTO: 2.7 X10(6)/MCL (ref 4.2–5.4)
RBC # BLD AUTO: 2.8 X10(6)/MCL (ref 4.2–5.4)
RBC # BLD AUTO: 2.92 10*6/UL (ref 4.2–5.4)
RBC # BLD AUTO: 2.99 10*6/UL (ref 4.2–5.4)
RBC # BLD AUTO: 3 X10(6)/MCL (ref 4.2–5.4)
RBC # BLD AUTO: 3.35 X10(6)/MCL (ref 4.2–5.4)
RBC # BLD AUTO: 3.59 X10(6)/MCL (ref 4.2–5.4)
RBC #/AREA URNS AUTO: <5 /HPF
RBC #/AREA URNS AUTO: ABNORMAL /HPF
RBC #/AREA URNS HPF: 0 /[HPF] (ref 2–5)
RBC MORPH BLD: ABNORMAL
RBC UR QL AUTO: NEGATIVE
RBC UR QL AUTO: NEGATIVE UNIT/L
RBC UR QL AUTO: NEGATIVE UNIT/L
RBCS: NORMAL
RBCS: NORMAL
RIGHT CCA DIST DIAS: 16 CM/S
RIGHT CCA DIST SYS: 84 CM/S
RIGHT CCA PROX DIAS: 14 CM/S
RIGHT CCA PROX SYS: 71 CM/S
RIGHT ECA DIAS: 0 CM/S
RIGHT ECA SYS: 343 CM/S
RIGHT ICA DIST DIAS: 20 CM/S
RIGHT ICA DIST SYS: 95 CM/S
RIGHT ICA MID DIAS: 23 CM/S
RIGHT ICA MID SYS: 102 CM/S
RIGHT ICA PROX DIAS: 18 CM/S
RIGHT ICA PROX SYS: 114 CM/S
RIGHT VERTEBRAL DIAS: 0 CM/S
RIGHT VERTEBRAL SYS: 61 CM/S
SARS-COV-2 AG RESP QL IA.RAPID: NEGATIVE
SARS-COV-2 RDRP RESP QL NAA+PROBE: NEGATIVE
SODIUM SERPL-SCNC: 134 MMOL/L (ref 132–146)
SODIUM SERPL-SCNC: 137 MMOL/L (ref 132–146)
SODIUM SERPL-SCNC: 138 MMOL/L (ref 132–146)
SODIUM SERPL-SCNC: 139 MMOL/L (ref 132–146)
SODIUM SERPL-SCNC: 140 MMOL/L (ref 132–146)
SODIUM SERPL-SCNC: 141 MMOL/L (ref 132–146)
SODIUM SERPL-SCNC: 141 MMOL/L (ref 132–146)
SODIUM SERPL-SCNC: 142 MMOL/L (ref 132–146)
SODIUM SERPL-SCNC: 143 MMOL/L (ref 132–146)
SODIUM SERPL-SCNC: 143 MMOL/L (ref 132–146)
SODIUM SERPL-SCNC: 144 MMOL/L (ref 132–146)
SP GR UR STRIP.AUTO: 1.02 (ref 1–1.03)
SP GR UR STRIP.AUTO: <=1.005
SP GR UR STRIP: 1.01 (ref 1–1.03)
SQUAMOUS #/AREA URNS AUTO: <5 /HPF
SQUAMOUS #/AREA URNS AUTO: ABNORMAL /HPF
SQUAMOUS EPITHELIAL, UA: NORMAL
T3RU NFR SERPL: NORMAL %
TDI LATERAL: 0.08 M/S
TDI SEPTAL: 0.04 M/S
TDI: 0.06 M/S
TEAR DROP CELL (OLG): ABNORMAL
TEAR DROP CELL (OLG): ABNORMAL
TIBC SERPL-MCNC: 136 UG/DL (ref 250–450)
TIBC SERPL-MCNC: 199 UG/DL (ref 250–450)
TIBC SERPL-MCNC: 218 UG/DL (ref 250–450)
TIBC SERPL-MCNC: 222 UG/DL (ref 250–450)
TIBC SERPL-MCNC: 66 UG/DL (ref 70–310)
TIBC SERPL-MCNC: 72 UG/DL (ref 70–310)
TIBC SERPL-MCNC: 77 UG/DL (ref 70–310)
TIBC SERPL-MCNC: 98 UG/DL (ref 70–310)
TR MAX PG: 25 MMHG
TRANSFERRIN SERPL-MCNC: 111 MG/DL
TRANSFERRIN SERPL-MCNC: 164 MG/DL
TRANSFERRIN SERPL-MCNC: 175 MG/DL
TRANSFERRIN SERPL-MCNC: 192 MG/DL
TRICUSPID ANNULAR PLANE SYSTOLIC EXCURSION: 2.21 CM
TROPONIN I SERPL-MCNC: <0.01 NG/ML (ref 0–0.03)
TROPONIN I SERPL-MCNC: <0.01 NG/ML (ref 0–0.04)
TROPONIN I SERPL-MCNC: <0.01 NG/ML (ref 0–0.04)
TSH SERPL-ACNC: 2.32 UIU/ML (ref 0.35–4.94)
TV REST PULMONARY ARTERY PRESSURE: 33 MMHG
UNIT NUMBER: NORMAL
UROBILINOGEN UR STRIP-ACNC: 0.2
UROBILINOGEN UR STRIP-ACNC: 0.2 MG/DL
UROBILINOGEN UR STRIP-ACNC: 0.2 MG/DL
UROBILINOGEN UR STRIP-ACNC: NEGATIVE
VIT B12 SERPL-MCNC: 644 NG/L (ref 180–914)
VIT B12 SERPL-MCNC: 857 PG/ML (ref 213–816)
WBC # SPEC AUTO: 2.3 X10(3)/MCL (ref 4.5–11.5)
WBC # SPEC AUTO: 2.5 X10(3)/MCL (ref 4.5–11.5)
WBC # SPEC AUTO: 2.7 X10(3)/MCL (ref 4.5–11.5)
WBC # SPEC AUTO: 2.8 X10(3)/MCL (ref 4.5–11.5)
WBC # SPEC AUTO: 2.8 X10(3)/MCL (ref 4.5–11.5)
WBC # SPEC AUTO: 2.9 10*3/UL (ref 4.5–11.5)
WBC # SPEC AUTO: 3 10*3/UL (ref 4.5–11.5)
WBC # SPEC AUTO: 3 X10(3)/MCL (ref 4.5–11.5)
WBC # SPEC AUTO: 3.2 X10(3)/MCL (ref 4.5–11.5)
WBC # SPEC AUTO: 3.3 10*3/UL (ref 4.5–11.5)
WBC # SPEC AUTO: 3.3 X10(3)/MCL (ref 4.5–11.5)
WBC # SPEC AUTO: 3.3 X10(3)/MCL (ref 4.5–11.5)
WBC # SPEC AUTO: 3.4 10*3/UL (ref 4.5–11.5)
WBC # SPEC AUTO: 3.4 X10(3)/MCL (ref 4.5–11.5)
WBC # SPEC AUTO: 3.4 X10(3)/MCL (ref 4.5–11.5)
WBC # SPEC AUTO: 3.5 10*3/UL (ref 4.5–11.5)
WBC # SPEC AUTO: 3.5 X10(3)/MCL (ref 4.5–11.5)
WBC # SPEC AUTO: 3.7 X10(3)/MCL (ref 4.5–11.5)
WBC # SPEC AUTO: 3.7 X10(3)/MCL (ref 4.5–11.5)
WBC # SPEC AUTO: 3.8 10*3/UL (ref 4.5–11.5)
WBC # SPEC AUTO: 3.8 X10(3)/MCL (ref 4.5–11.5)
WBC # SPEC AUTO: 3.8 X10(3)/MCL (ref 4.5–11.5)
WBC # SPEC AUTO: 3.9 X10(3)/MCL (ref 4.5–11.5)
WBC # SPEC AUTO: 4.1 10*3/UL (ref 4.5–11.5)
WBC #/AREA URNS AUTO: <5 /HPF
WBC #/AREA URNS AUTO: ABNORMAL /HPF
WBC #/AREA URNS HPF: NORMAL /[HPF] (ref 2–5)
WBC VACUOLES (OHS): ABNORMAL

## 2022-01-01 PROCEDURE — 36415 COLL VENOUS BLD VENIPUNCTURE: CPT | Performed by: INTERNAL MEDICINE

## 2022-01-01 PROCEDURE — 63600175 PHARM REV CODE 636 W HCPCS: Mod: JG | Performed by: INTERNAL MEDICINE

## 2022-01-01 PROCEDURE — 87635 SARS-COV-2 COVID-19 AMP PRB: CPT | Performed by: EMERGENCY MEDICINE

## 2022-01-01 PROCEDURE — 85025 COMPLETE CBC W/AUTO DIFF WBC: CPT

## 2022-01-01 PROCEDURE — 36415 COLL VENOUS BLD VENIPUNCTURE: CPT

## 2022-01-01 PROCEDURE — 93010 ELECTROCARDIOGRAM REPORT: CPT | Mod: ,,, | Performed by: INTERNAL MEDICINE

## 2022-01-01 PROCEDURE — 25000003 PHARM REV CODE 250: Performed by: INTERNAL MEDICINE

## 2022-01-01 PROCEDURE — 99999 PR PBB SHADOW E&M-EST. PATIENT-LVL IV: ICD-10-PCS | Mod: PBBFAC,,, | Performed by: NURSE PRACTITIONER

## 2022-01-01 PROCEDURE — 63600175 PHARM REV CODE 636 W HCPCS: Performed by: EMERGENCY MEDICINE

## 2022-01-01 PROCEDURE — 36430 TRANSFUSION BLD/BLD COMPNT: CPT

## 2022-01-01 PROCEDURE — 83735 ASSAY OF MAGNESIUM: CPT | Performed by: STUDENT IN AN ORGANIZED HEALTH CARE EDUCATION/TRAINING PROGRAM

## 2022-01-01 PROCEDURE — 99215 OFFICE O/P EST HI 40 MIN: CPT | Mod: S$PBB,,, | Performed by: NURSE PRACTITIONER

## 2022-01-01 PROCEDURE — 85025 COMPLETE CBC W/AUTO DIFF WBC: CPT | Performed by: PATHOLOGY

## 2022-01-01 PROCEDURE — 81001 URINALYSIS AUTO W/SCOPE: CPT | Performed by: FAMILY MEDICINE

## 2022-01-01 PROCEDURE — 24530 CLTX SPRCNDYLR HUMERAL FX WO: CPT | Mod: LT,,, | Performed by: STUDENT IN AN ORGANIZED HEALTH CARE EDUCATION/TRAINING PROGRAM

## 2022-01-01 PROCEDURE — 80053 COMPREHEN METABOLIC PANEL: CPT

## 2022-01-01 PROCEDURE — 85060 BLOOD SMEAR INTERPRETATION: CPT | Performed by: PATHOLOGY

## 2022-01-01 PROCEDURE — 93005 ELECTROCARDIOGRAM TRACING: CPT

## 2022-01-01 PROCEDURE — 63600175 PHARM REV CODE 636 W HCPCS: Performed by: PATHOLOGY

## 2022-01-01 PROCEDURE — 99999 PR PBB SHADOW E&M-EST. PATIENT-LVL IV: CPT | Mod: PBBFAC,,, | Performed by: NURSE PRACTITIONER

## 2022-01-01 PROCEDURE — 99214 OFFICE O/P EST MOD 30 MIN: CPT | Mod: PBBFAC | Performed by: NURSE PRACTITIONER

## 2022-01-01 PROCEDURE — 36415 COLL VENOUS BLD VENIPUNCTURE: CPT | Performed by: STUDENT IN AN ORGANIZED HEALTH CARE EDUCATION/TRAINING PROGRAM

## 2022-01-01 PROCEDURE — 11000001 HC ACUTE MED/SURG PRIVATE ROOM

## 2022-01-01 PROCEDURE — 99285 EMERGENCY DEPT VISIT HI MDM: CPT | Mod: 25

## 2022-01-01 PROCEDURE — 63600175 PHARM REV CODE 636 W HCPCS: Performed by: INTERNAL MEDICINE

## 2022-01-01 PROCEDURE — 36415 COLL VENOUS BLD VENIPUNCTURE: CPT | Performed by: EMERGENCY MEDICINE

## 2022-01-01 PROCEDURE — 99213 PR OFFICE/OUTPT VISIT, EST, LEVL III, 20-29 MIN: ICD-10-PCS | Mod: S$PBB,,, | Performed by: INTERNAL MEDICINE

## 2022-01-01 PROCEDURE — G0439 PR MEDICARE ANNUAL WELLNESS SUBSEQUENT VISIT: ICD-10-PCS | Mod: ,,, | Performed by: FAMILY MEDICINE

## 2022-01-01 PROCEDURE — 25000003 PHARM REV CODE 250: Performed by: EMERGENCY MEDICINE

## 2022-01-01 PROCEDURE — 99213 PR OFFICE/OUTPT VISIT, EST, LEVL III, 20-29 MIN: ICD-10-PCS | Mod: 25,,, | Performed by: FAMILY MEDICINE

## 2022-01-01 PROCEDURE — 99499 NO LOS: ICD-10-PCS | Mod: ,,, | Performed by: STUDENT IN AN ORGANIZED HEALTH CARE EDUCATION/TRAINING PROGRAM

## 2022-01-01 PROCEDURE — 99497 PR ADVNCD CARE PLAN 30 MIN: ICD-10-PCS | Mod: 33,,, | Performed by: FAMILY MEDICINE

## 2022-01-01 PROCEDURE — 86920 COMPATIBILITY TEST SPIN: CPT | Performed by: INTERNAL MEDICINE

## 2022-01-01 PROCEDURE — 85025 COMPLETE CBC W/AUTO DIFF WBC: CPT | Performed by: STUDENT IN AN ORGANIZED HEALTH CARE EDUCATION/TRAINING PROGRAM

## 2022-01-01 PROCEDURE — 25000003 PHARM REV CODE 250: Performed by: STUDENT IN AN ORGANIZED HEALTH CARE EDUCATION/TRAINING PROGRAM

## 2022-01-01 PROCEDURE — 90694 FLU VACCINE - QUADRIVALENT - ADJUVANTED: ICD-10-PCS | Mod: ,,, | Performed by: FAMILY MEDICINE

## 2022-01-01 PROCEDURE — 83540 ASSAY OF IRON: CPT | Performed by: INTERNAL MEDICINE

## 2022-01-01 PROCEDURE — 96372 THER/PROPH/DIAG INJ SC/IM: CPT

## 2022-01-01 PROCEDURE — 24530 PR CLOSED RX HUMERAL SUPRACONDYLAR FX: ICD-10-PCS | Mod: LT,,, | Performed by: STUDENT IN AN ORGANIZED HEALTH CARE EDUCATION/TRAINING PROGRAM

## 2022-01-01 PROCEDURE — P9016 RBC LEUKOCYTES REDUCED: HCPCS | Performed by: INTERNAL MEDICINE

## 2022-01-01 PROCEDURE — 99999 PR PBB SHADOW E&M-EST. PATIENT-LVL IV: CPT | Mod: PBBFAC,,, | Performed by: INTERNAL MEDICINE

## 2022-01-01 PROCEDURE — 85007 BL SMEAR W/DIFF WBC COUNT: CPT | Performed by: STUDENT IN AN ORGANIZED HEALTH CARE EDUCATION/TRAINING PROGRAM

## 2022-01-01 PROCEDURE — 25500020 PHARM REV CODE 255: Performed by: INTERNAL MEDICINE

## 2022-01-01 PROCEDURE — 99223 1ST HOSP IP/OBS HIGH 75: CPT | Mod: 57,,, | Performed by: STUDENT IN AN ORGANIZED HEALTH CARE EDUCATION/TRAINING PROGRAM

## 2022-01-01 PROCEDURE — 84484 ASSAY OF TROPONIN QUANT: CPT | Performed by: EMERGENCY MEDICINE

## 2022-01-01 PROCEDURE — 84484 ASSAY OF TROPONIN QUANT: CPT | Performed by: STUDENT IN AN ORGANIZED HEALTH CARE EDUCATION/TRAINING PROGRAM

## 2022-01-01 PROCEDURE — 90471 IMMUNIZATION ADMIN: CPT | Performed by: INTERNAL MEDICINE

## 2022-01-01 PROCEDURE — 82746 ASSAY OF FOLIC ACID SERUM: CPT | Performed by: STUDENT IN AN ORGANIZED HEALTH CARE EDUCATION/TRAINING PROGRAM

## 2022-01-01 PROCEDURE — 86923 COMPATIBILITY TEST ELECTRIC: CPT | Performed by: NURSE PRACTITIONER

## 2022-01-01 PROCEDURE — 99214 OFFICE O/P EST MOD 30 MIN: CPT | Mod: PBBFAC | Performed by: INTERNAL MEDICINE

## 2022-01-01 PROCEDURE — 83540 ASSAY OF IRON: CPT

## 2022-01-01 PROCEDURE — 99999 PR PBB SHADOW E&M-EST. PATIENT-LVL IV: ICD-10-PCS | Mod: PBBFAC,,, | Performed by: INTERNAL MEDICINE

## 2022-01-01 PROCEDURE — 38220 DX BONE MARROW ASPIRATIONS: CPT | Performed by: PATHOLOGY

## 2022-01-01 PROCEDURE — 80053 COMPREHEN METABOLIC PANEL: CPT | Performed by: STUDENT IN AN ORGANIZED HEALTH CARE EDUCATION/TRAINING PROGRAM

## 2022-01-01 PROCEDURE — 99213 PR OFFICE/OUTPT VISIT, EST, LEVL III, 20-29 MIN: ICD-10-PCS | Mod: S$PBB,,, | Performed by: NURSE PRACTITIONER

## 2022-01-01 PROCEDURE — 86850 RBC ANTIBODY SCREEN: CPT | Performed by: NURSE PRACTITIONER

## 2022-01-01 PROCEDURE — 99214 OFFICE O/P EST MOD 30 MIN: CPT | Mod: S$PBB,,, | Performed by: INTERNAL MEDICINE

## 2022-01-01 PROCEDURE — 85027 COMPLETE CBC AUTOMATED: CPT

## 2022-01-01 PROCEDURE — 93010 EKG 12-LEAD: ICD-10-PCS | Mod: ,,, | Performed by: INTERNAL MEDICINE

## 2022-01-01 PROCEDURE — 99214 OFFICE O/P EST MOD 30 MIN: CPT | Mod: ,,, | Performed by: NURSE PRACTITIONER

## 2022-01-01 PROCEDURE — 86901 BLOOD TYPING SEROLOGIC RH(D): CPT | Performed by: INTERNAL MEDICINE

## 2022-01-01 PROCEDURE — 86901 BLOOD TYPING SEROLOGIC RH(D): CPT | Performed by: STUDENT IN AN ORGANIZED HEALTH CARE EDUCATION/TRAINING PROGRAM

## 2022-01-01 PROCEDURE — 84100 ASSAY OF PHOSPHORUS: CPT | Performed by: STUDENT IN AN ORGANIZED HEALTH CARE EDUCATION/TRAINING PROGRAM

## 2022-01-01 PROCEDURE — 82607 VITAMIN B-12: CPT | Performed by: INTERNAL MEDICINE

## 2022-01-01 PROCEDURE — 99213 OFFICE O/P EST LOW 20 MIN: CPT | Mod: S$PBB,,, | Performed by: INTERNAL MEDICINE

## 2022-01-01 PROCEDURE — G0439 PPPS, SUBSEQ VISIT: HCPCS | Mod: ,,, | Performed by: FAMILY MEDICINE

## 2022-01-01 PROCEDURE — 80048 BASIC METABOLIC PNL TOTAL CA: CPT | Performed by: INTERNAL MEDICINE

## 2022-01-01 PROCEDURE — 80053 COMPREHEN METABOLIC PANEL: CPT | Performed by: EMERGENCY MEDICINE

## 2022-01-01 PROCEDURE — 86901 BLOOD TYPING SEROLOGIC RH(D): CPT | Performed by: NURSE PRACTITIONER

## 2022-01-01 PROCEDURE — G0008 FLU VACCINE - QUADRIVALENT - ADJUVANTED: ICD-10-PCS | Mod: ,,, | Performed by: FAMILY MEDICINE

## 2022-01-01 PROCEDURE — 99497 ADVNCD CARE PLAN 30 MIN: CPT | Mod: 33,,, | Performed by: FAMILY MEDICINE

## 2022-01-01 PROCEDURE — 99203 OFFICE O/P NEW LOW 30 MIN: CPT | Mod: ,,, | Performed by: STUDENT IN AN ORGANIZED HEALTH CARE EDUCATION/TRAINING PROGRAM

## 2022-01-01 PROCEDURE — 82728 ASSAY OF FERRITIN: CPT

## 2022-01-01 PROCEDURE — 86850 RBC ANTIBODY SCREEN: CPT | Performed by: INTERNAL MEDICINE

## 2022-01-01 PROCEDURE — 99213 OFFICE O/P EST LOW 20 MIN: CPT | Mod: 25,,, | Performed by: FAMILY MEDICINE

## 2022-01-01 PROCEDURE — G0179 PR HOME HEALTH MD RECERTIFICATION: ICD-10-PCS | Mod: ,,, | Performed by: FAMILY MEDICINE

## 2022-01-01 PROCEDURE — 99999 PR PBB SHADOW E&M-EST. PATIENT-LVL III: ICD-10-PCS | Mod: PBBFAC,,, | Performed by: NURSE PRACTITIONER

## 2022-01-01 PROCEDURE — 63600175 PHARM REV CODE 636 W HCPCS: Mod: JG,EC | Performed by: INTERNAL MEDICINE

## 2022-01-01 PROCEDURE — 86920 COMPATIBILITY TEST SPIN: CPT | Performed by: NURSE PRACTITIONER

## 2022-01-01 PROCEDURE — 80048 BASIC METABOLIC PNL TOTAL CA: CPT | Performed by: STUDENT IN AN ORGANIZED HEALTH CARE EDUCATION/TRAINING PROGRAM

## 2022-01-01 PROCEDURE — 86923 COMPATIBILITY TEST ELECTRIC: CPT | Mod: 91 | Performed by: INTERNAL MEDICINE

## 2022-01-01 PROCEDURE — 99499 UNLISTED E&M SERVICE: CPT | Mod: ,,, | Performed by: STUDENT IN AN ORGANIZED HEALTH CARE EDUCATION/TRAINING PROGRAM

## 2022-01-01 PROCEDURE — 99214 PR OFFICE/OUTPT VISIT, EST, LEVL IV, 30-39 MIN: ICD-10-PCS | Mod: S$PBB,,, | Performed by: INTERNAL MEDICINE

## 2022-01-01 PROCEDURE — 99284 EMERGENCY DEPT VISIT MOD MDM: CPT | Mod: 25

## 2022-01-01 PROCEDURE — 96374 THER/PROPH/DIAG INJ IV PUSH: CPT

## 2022-01-01 PROCEDURE — 99213 OFFICE O/P EST LOW 20 MIN: CPT | Mod: PBBFAC | Performed by: NURSE PRACTITIONER

## 2022-01-01 PROCEDURE — 90715 TDAP VACCINE 7 YRS/> IM: CPT | Performed by: INTERNAL MEDICINE

## 2022-01-01 PROCEDURE — 25000003 PHARM REV CODE 250: Performed by: NURSE PRACTITIONER

## 2022-01-01 PROCEDURE — 99214 PR OFFICE/OUTPT VISIT, EST, LEVL IV, 30-39 MIN: ICD-10-PCS | Mod: ,,, | Performed by: NURSE PRACTITIONER

## 2022-01-01 PROCEDURE — 99223 PR INITIAL HOSPITAL CARE,LEVL III: ICD-10-PCS | Mod: 57,,, | Performed by: STUDENT IN AN ORGANIZED HEALTH CARE EDUCATION/TRAINING PROGRAM

## 2022-01-01 PROCEDURE — P9016 RBC LEUKOCYTES REDUCED: HCPCS | Performed by: STUDENT IN AN ORGANIZED HEALTH CARE EDUCATION/TRAINING PROGRAM

## 2022-01-01 PROCEDURE — 36415 COLL VENOUS BLD VENIPUNCTURE: CPT | Performed by: NURSE PRACTITIONER

## 2022-01-01 PROCEDURE — 99213 OFFICE O/P EST LOW 20 MIN: CPT | Mod: S$PBB,,, | Performed by: NURSE PRACTITIONER

## 2022-01-01 PROCEDURE — 99215 PR OFFICE/OUTPT VISIT, EST, LEVL V, 40-54 MIN: ICD-10-PCS | Mod: S$PBB,,, | Performed by: NURSE PRACTITIONER

## 2022-01-01 PROCEDURE — G0180 MD CERTIFICATION HHA PATIENT: HCPCS | Mod: ,,, | Performed by: FAMILY MEDICINE

## 2022-01-01 PROCEDURE — 99999 PR PBB SHADOW E&M-EST. PATIENT-LVL III: CPT | Mod: PBBFAC,,, | Performed by: NURSE PRACTITIONER

## 2022-01-01 PROCEDURE — 85610 PROTHROMBIN TIME: CPT | Performed by: STUDENT IN AN ORGANIZED HEALTH CARE EDUCATION/TRAINING PROGRAM

## 2022-01-01 PROCEDURE — 29505 APPLICATION LONG LEG SPLINT: CPT

## 2022-01-01 PROCEDURE — 36415 COLL VENOUS BLD VENIPUNCTURE: CPT | Performed by: PATHOLOGY

## 2022-01-01 PROCEDURE — G0179 MD RECERTIFICATION HHA PT: HCPCS | Mod: ,,, | Performed by: FAMILY MEDICINE

## 2022-01-01 PROCEDURE — G0180 PR HOME HEALTH MD CERTIFICATION: ICD-10-PCS | Mod: ,,, | Performed by: FAMILY MEDICINE

## 2022-01-01 PROCEDURE — 85730 THROMBOPLASTIN TIME PARTIAL: CPT | Performed by: STUDENT IN AN ORGANIZED HEALTH CARE EDUCATION/TRAINING PROGRAM

## 2022-01-01 PROCEDURE — 83921 ORGANIC ACID SINGLE QUANT: CPT | Performed by: STUDENT IN AN ORGANIZED HEALTH CARE EDUCATION/TRAINING PROGRAM

## 2022-01-01 PROCEDURE — 85027 COMPLETE CBC AUTOMATED: CPT | Performed by: EMERGENCY MEDICINE

## 2022-01-01 PROCEDURE — 25500020 PHARM REV CODE 255: Performed by: EMERGENCY MEDICINE

## 2022-01-01 PROCEDURE — 90694 VACC AIIV4 NO PRSRV 0.5ML IM: CPT | Mod: ,,, | Performed by: FAMILY MEDICINE

## 2022-01-01 PROCEDURE — 38222 DX BONE MARROW BX & ASPIR: CPT | Performed by: PATHOLOGY

## 2022-01-01 PROCEDURE — 86920 COMPATIBILITY TEST SPIN: CPT | Performed by: STUDENT IN AN ORGANIZED HEALTH CARE EDUCATION/TRAINING PROGRAM

## 2022-01-01 PROCEDURE — 84443 ASSAY THYROID STIM HORMONE: CPT | Performed by: EMERGENCY MEDICINE

## 2022-01-01 PROCEDURE — 85025 COMPLETE CBC W/AUTO DIFF WBC: CPT | Performed by: INTERNAL MEDICINE

## 2022-01-01 PROCEDURE — A9577 INJ MULTIHANCE: HCPCS | Performed by: INTERNAL MEDICINE

## 2022-01-01 PROCEDURE — 86923 COMPATIBILITY TEST ELECTRIC: CPT | Performed by: INTERNAL MEDICINE

## 2022-01-01 PROCEDURE — 82746 ASSAY OF FOLIC ACID SERUM: CPT | Performed by: INTERNAL MEDICINE

## 2022-01-01 PROCEDURE — 99203 PR OFFICE/OUTPT VISIT, NEW, LEVL III, 30-44 MIN: ICD-10-PCS | Mod: ,,, | Performed by: STUDENT IN AN ORGANIZED HEALTH CARE EDUCATION/TRAINING PROGRAM

## 2022-01-01 PROCEDURE — 97166 OT EVAL MOD COMPLEX 45 MIN: CPT

## 2022-01-01 PROCEDURE — 99214 OFFICE O/P EST MOD 30 MIN: CPT | Mod: S$PBB,,, | Performed by: NURSE PRACTITIONER

## 2022-01-01 PROCEDURE — 25000003 PHARM REV CODE 250: Performed by: FAMILY MEDICINE

## 2022-01-01 PROCEDURE — G0008 ADMIN INFLUENZA VIRUS VAC: HCPCS | Mod: ,,, | Performed by: FAMILY MEDICINE

## 2022-01-01 PROCEDURE — 85027 COMPLETE CBC AUTOMATED: CPT | Performed by: STUDENT IN AN ORGANIZED HEALTH CARE EDUCATION/TRAINING PROGRAM

## 2022-01-01 PROCEDURE — P9040 RBC LEUKOREDUCED IRRADIATED: HCPCS | Performed by: NURSE PRACTITIONER

## 2022-01-01 PROCEDURE — 81001 URINALYSIS AUTO W/SCOPE: CPT | Performed by: EMERGENCY MEDICINE

## 2022-01-01 PROCEDURE — 83690 ASSAY OF LIPASE: CPT | Performed by: EMERGENCY MEDICINE

## 2022-01-01 PROCEDURE — 99214 PR OFFICE/OUTPT VISIT, EST, LEVL IV, 30-39 MIN: ICD-10-PCS | Mod: S$PBB,,, | Performed by: NURSE PRACTITIONER

## 2022-01-01 PROCEDURE — G0378 HOSPITAL OBSERVATION PER HR: HCPCS

## 2022-01-01 RX ORDER — CEPHALEXIN 500 MG/1
500 CAPSULE ORAL 2 TIMES DAILY
Qty: 10 CAPSULE | Refills: 0 | Status: SHIPPED | OUTPATIENT
Start: 2022-01-01 | End: 2022-01-01

## 2022-01-01 RX ORDER — HYDROCODONE BITARTRATE AND ACETAMINOPHEN 500; 5 MG/1; MG/1
TABLET ORAL ONCE
Status: COMPLETED | OUTPATIENT
Start: 2022-01-01 | End: 2022-01-01

## 2022-01-01 RX ORDER — FLUMAZENIL 0.1 MG/ML
0.2 INJECTION INTRAVENOUS ONCE
Status: DISCONTINUED | OUTPATIENT
Start: 2022-01-01 | End: 2022-01-01 | Stop reason: HOSPADM

## 2022-01-01 RX ORDER — ASPIRIN 81 MG/1
81 TABLET ORAL DAILY
COMMUNITY

## 2022-01-01 RX ORDER — CEPHALEXIN 500 MG/1
500 CAPSULE ORAL 2 TIMES DAILY
Status: DISCONTINUED | OUTPATIENT
Start: 2022-01-01 | End: 2022-01-01 | Stop reason: HOSPADM

## 2022-01-01 RX ORDER — MIDAZOLAM HYDROCHLORIDE 1 MG/ML
.5-4 INJECTION INTRAMUSCULAR; INTRAVENOUS
Status: CANCELLED | OUTPATIENT
Start: 2022-01-01

## 2022-01-01 RX ORDER — HYDROCODONE BITARTRATE AND ACETAMINOPHEN 500; 5 MG/1; MG/1
TABLET ORAL ONCE
Status: CANCELLED | OUTPATIENT
Start: 2022-01-01 | End: 2022-01-01

## 2022-01-01 RX ORDER — SODIUM CHLORIDE, SODIUM GLUCONATE, SODIUM ACETATE, POTASSIUM CHLORIDE AND MAGNESIUM CHLORIDE 30; 37; 368; 526; 502 MG/100ML; MG/100ML; MG/100ML; MG/100ML; MG/100ML
INJECTION, SOLUTION INTRAVENOUS CONTINUOUS
Status: CANCELLED | OUTPATIENT
Start: 2022-01-01 | End: 2022-01-01

## 2022-01-01 RX ORDER — ONDANSETRON 2 MG/ML
4 INJECTION INTRAMUSCULAR; INTRAVENOUS EVERY 8 HOURS PRN
Status: DISCONTINUED | OUTPATIENT
Start: 2022-01-01 | End: 2022-01-01 | Stop reason: HOSPADM

## 2022-01-01 RX ORDER — CALCIUM CARBONATE 200(500)MG
1 TABLET,CHEWABLE ORAL DAILY
COMMUNITY

## 2022-01-01 RX ORDER — MUPIROCIN 20 MG/G
OINTMENT TOPICAL 3 TIMES DAILY
Qty: 60 G | Refills: 1 | Status: SHIPPED | OUTPATIENT
Start: 2022-01-01

## 2022-01-01 RX ORDER — ONDANSETRON 2 MG/ML
4 INJECTION INTRAMUSCULAR; INTRAVENOUS DAILY PRN
Status: CANCELLED | OUTPATIENT
Start: 2022-01-01

## 2022-01-01 RX ORDER — ISOSORBIDE MONONITRATE 60 MG/1
60 TABLET, EXTENDED RELEASE ORAL DAILY
Status: DISCONTINUED | OUTPATIENT
Start: 2022-01-01 | End: 2022-01-01 | Stop reason: HOSPADM

## 2022-01-01 RX ORDER — ROPIVACAINE HYDROCHLORIDE 5 MG/ML
30 INJECTION, SOLUTION EPIDURAL; INFILTRATION; PERINEURAL ONCE
Status: CANCELLED | OUTPATIENT
Start: 2022-01-01 | End: 2022-01-01

## 2022-01-01 RX ORDER — ACETAMINOPHEN 325 MG/1
650 TABLET ORAL
Status: CANCELLED | OUTPATIENT
Start: 2022-01-01

## 2022-01-01 RX ORDER — HYDROCODONE BITARTRATE AND ACETAMINOPHEN 500; 5 MG/1; MG/1
TABLET ORAL ONCE
Status: DISCONTINUED | OUTPATIENT
Start: 2022-01-01 | End: 2022-01-01 | Stop reason: HOSPADM

## 2022-01-01 RX ORDER — HYDROMORPHONE HYDROCHLORIDE 2 MG/ML
0.4 INJECTION, SOLUTION INTRAMUSCULAR; INTRAVENOUS; SUBCUTANEOUS EVERY 5 MIN PRN
Status: CANCELLED | OUTPATIENT
Start: 2022-01-01

## 2022-01-01 RX ORDER — PRAVASTATIN SODIUM 20 MG/1
20 TABLET ORAL DAILY
COMMUNITY

## 2022-01-01 RX ORDER — LEVOTHYROXINE SODIUM 88 UG/1
88 TABLET ORAL DAILY
Status: DISCONTINUED | OUTPATIENT
Start: 2022-01-01 | End: 2022-01-01 | Stop reason: HOSPADM

## 2022-01-01 RX ORDER — CALCIUM CARBONATE 200(500)MG
1 TABLET,CHEWABLE ORAL DAILY
Status: DISCONTINUED | OUTPATIENT
Start: 2022-01-01 | End: 2022-01-01 | Stop reason: HOSPADM

## 2022-01-01 RX ORDER — LACTULOSE 10 G/15ML
10 SOLUTION ORAL NIGHTLY
Qty: 1892 ML | Refills: 2 | Status: SHIPPED | OUTPATIENT
Start: 2022-01-01

## 2022-01-01 RX ORDER — ACETAMINOPHEN 325 MG/1
650 TABLET ORAL ONCE
Status: CANCELLED | OUTPATIENT
Start: 2022-01-01

## 2022-01-01 RX ORDER — ERGOCALCIFEROL 1.25 MG/1
5000 CAPSULE ORAL NIGHTLY
COMMUNITY

## 2022-01-01 RX ORDER — FLUMAZENIL 0.1 MG/ML
INJECTION INTRAVENOUS
Status: DISCONTINUED
Start: 2022-01-01 | End: 2022-01-01 | Stop reason: WASHOUT

## 2022-01-01 RX ORDER — ACETAMINOPHEN 325 MG/1
650 TABLET ORAL
Status: COMPLETED | OUTPATIENT
Start: 2022-01-01 | End: 2022-01-01

## 2022-01-01 RX ORDER — MEPERIDINE HYDROCHLORIDE 25 MG/ML
6.25 INJECTION INTRAMUSCULAR; INTRAVENOUS; SUBCUTANEOUS ONCE AS NEEDED
Status: CANCELLED | OUTPATIENT
Start: 2022-01-01 | End: 2022-01-01

## 2022-01-01 RX ORDER — ACETAMINOPHEN 500 MG
500 TABLET ORAL EVERY 4 HOURS
Qty: 100 TABLET | Refills: 0
Start: 2022-01-01

## 2022-01-01 RX ORDER — ACETAMINOPHEN 325 MG/1
650 TABLET ORAL EVERY 8 HOURS PRN
Status: DISCONTINUED | OUTPATIENT
Start: 2022-01-01 | End: 2022-01-01

## 2022-01-01 RX ORDER — LEVOTHYROXINE SODIUM 88 UG/1
88 TABLET ORAL DAILY
COMMUNITY
Start: 2022-01-01 | End: 2022-01-01 | Stop reason: SDUPTHER

## 2022-01-01 RX ORDER — TRIAMCINOLONE ACETONIDE 1 MG/G
CREAM TOPICAL 2 TIMES DAILY
Qty: 15 G | Refills: 3 | Status: SHIPPED | OUTPATIENT
Start: 2022-01-01 | End: 2022-01-01

## 2022-01-01 RX ORDER — SODIUM CHLORIDE 0.9 % (FLUSH) 0.9 %
10 SYRINGE (ML) INJECTION
Status: DISCONTINUED | OUTPATIENT
Start: 2022-01-01 | End: 2022-01-01 | Stop reason: HOSPADM

## 2022-01-01 RX ORDER — ACETAMINOPHEN 325 MG/1
650 TABLET ORAL ONCE
Status: COMPLETED | OUTPATIENT
Start: 2022-01-01 | End: 2022-01-01

## 2022-01-01 RX ORDER — LACTULOSE 10 G/15ML
SOLUTION ORAL
COMMUNITY
Start: 2022-01-01 | End: 2022-01-01 | Stop reason: SDUPTHER

## 2022-01-01 RX ORDER — DIPHENHYDRAMINE HCL 25 MG
25 CAPSULE ORAL ONCE
Status: CANCELLED | OUTPATIENT
Start: 2022-01-01

## 2022-01-01 RX ORDER — CARVEDILOL 6.25 MG/1
6.25 TABLET ORAL 2 TIMES DAILY
Status: ON HOLD | COMMUNITY
End: 2022-01-01 | Stop reason: HOSPADM

## 2022-01-01 RX ORDER — ISOSORBIDE MONONITRATE 60 MG/1
60 TABLET, EXTENDED RELEASE ORAL DAILY
COMMUNITY

## 2022-01-01 RX ORDER — DIPHENHYDRAMINE HYDROCHLORIDE 50 MG/ML
25 INJECTION INTRAMUSCULAR; INTRAVENOUS
Status: CANCELLED | OUTPATIENT
Start: 2022-01-01

## 2022-01-01 RX ORDER — ACETAMINOPHEN 500 MG
500 TABLET ORAL
Status: DISCONTINUED | OUTPATIENT
Start: 2022-01-01 | End: 2022-01-01 | Stop reason: HOSPADM

## 2022-01-01 RX ORDER — ENOXAPARIN SODIUM 100 MG/ML
30 INJECTION SUBCUTANEOUS EVERY 24 HOURS
Status: DISCONTINUED | OUTPATIENT
Start: 2022-01-01 | End: 2022-01-01 | Stop reason: HOSPADM

## 2022-01-01 RX ORDER — LENALIDOMIDE 5 MG/1
5 CAPSULE ORAL DAILY
Qty: 21 EACH | Refills: 6 | Status: SHIPPED | OUTPATIENT
Start: 2022-01-01

## 2022-01-01 RX ORDER — DIPHENHYDRAMINE HYDROCHLORIDE 50 MG/ML
25 INJECTION INTRAMUSCULAR; INTRAVENOUS ONCE
Status: COMPLETED | OUTPATIENT
Start: 2022-01-01 | End: 2022-01-01

## 2022-01-01 RX ORDER — ASPIRIN 81 MG/1
81 TABLET ORAL DAILY
Status: DISCONTINUED | OUTPATIENT
Start: 2022-01-01 | End: 2022-01-01 | Stop reason: HOSPADM

## 2022-01-01 RX ORDER — FENTANYL CITRATE 50 UG/ML
25 INJECTION, SOLUTION INTRAMUSCULAR; INTRAVENOUS
Status: CANCELLED | OUTPATIENT
Start: 2022-01-01

## 2022-01-01 RX ORDER — CALCIUM ACETATE 667 MG/1
500 CAPSULE ORAL DAILY
Status: ON HOLD | COMMUNITY
End: 2022-01-01 | Stop reason: CLARIF

## 2022-01-01 RX ORDER — MIDAZOLAM HYDROCHLORIDE 5 MG/ML
5 INJECTION INTRAMUSCULAR; INTRAVENOUS ONCE
Status: CANCELLED | OUTPATIENT
Start: 2022-01-01 | End: 2022-01-01

## 2022-01-01 RX ORDER — TRAMADOL HYDROCHLORIDE 50 MG/1
25 TABLET ORAL EVERY 8 HOURS PRN
Qty: 28 TABLET | Refills: 0 | Status: SHIPPED | OUTPATIENT
Start: 2022-01-01

## 2022-01-01 RX ORDER — PRAVASTATIN SODIUM 10 MG/1
20 TABLET ORAL DAILY
Status: DISCONTINUED | OUTPATIENT
Start: 2022-01-01 | End: 2022-01-01 | Stop reason: HOSPADM

## 2022-01-01 RX ORDER — MIDAZOLAM HYDROCHLORIDE 5 MG/ML
5 INJECTION INTRAMUSCULAR; INTRAVENOUS ONCE
Status: COMPLETED | OUTPATIENT
Start: 2022-01-01 | End: 2022-01-01

## 2022-01-01 RX ORDER — FAMOTIDINE 20 MG/1
20 TABLET, FILM COATED ORAL DAILY
Status: DISCONTINUED | OUTPATIENT
Start: 2022-01-01 | End: 2022-01-01 | Stop reason: HOSPADM

## 2022-01-01 RX ORDER — HYDROCODONE BITARTRATE AND ACETAMINOPHEN 500; 5 MG/1; MG/1
TABLET ORAL
Status: DISCONTINUED | OUTPATIENT
Start: 2022-01-01 | End: 2022-01-01 | Stop reason: HOSPADM

## 2022-01-01 RX ORDER — MIDAZOLAM HYDROCHLORIDE 5 MG/ML
INJECTION INTRAMUSCULAR; INTRAVENOUS
Status: DISCONTINUED
Start: 2022-01-01 | End: 2022-01-01 | Stop reason: HOSPADM

## 2022-01-01 RX ORDER — SODIUM CHLORIDE, SODIUM LACTATE, POTASSIUM CHLORIDE, CALCIUM CHLORIDE 600; 310; 30; 20 MG/100ML; MG/100ML; MG/100ML; MG/100ML
INJECTION, SOLUTION INTRAVENOUS CONTINUOUS
Status: CANCELLED | OUTPATIENT
Start: 2022-01-01

## 2022-01-01 RX ORDER — DIPHENHYDRAMINE HYDROCHLORIDE 50 MG/ML
25 INJECTION INTRAMUSCULAR; INTRAVENOUS ONCE
Status: CANCELLED | OUTPATIENT
Start: 2022-01-01

## 2022-01-01 RX ORDER — LOSARTAN POTASSIUM 50 MG/1
100 TABLET ORAL DAILY
Status: DISCONTINUED | OUTPATIENT
Start: 2022-01-01 | End: 2022-01-01 | Stop reason: HOSPADM

## 2022-01-01 RX ORDER — DIPHENHYDRAMINE HYDROCHLORIDE 50 MG/ML
25 INJECTION INTRAMUSCULAR; INTRAVENOUS
Status: COMPLETED | OUTPATIENT
Start: 2022-01-01 | End: 2022-01-01

## 2022-01-01 RX ORDER — TALC
6 POWDER (GRAM) TOPICAL NIGHTLY PRN
Status: DISCONTINUED | OUTPATIENT
Start: 2022-01-01 | End: 2022-01-01 | Stop reason: HOSPADM

## 2022-01-01 RX ORDER — DIPHENHYDRAMINE HCL 25 MG
25 CAPSULE ORAL ONCE
Status: COMPLETED | OUTPATIENT
Start: 2022-01-01 | End: 2022-01-01

## 2022-01-01 RX ORDER — LOSARTAN POTASSIUM 100 MG/1
100 TABLET ORAL DAILY
Status: ON HOLD | COMMUNITY
End: 2022-01-01 | Stop reason: HOSPADM

## 2022-01-01 RX ORDER — IBUPROFEN 600 MG/1
600 TABLET ORAL EVERY 8 HOURS PRN
Qty: 20 TABLET | Refills: 0 | Status: SHIPPED | OUTPATIENT
Start: 2022-01-01

## 2022-01-01 RX ORDER — CARVEDILOL 3.12 MG/1
6.25 TABLET ORAL 2 TIMES DAILY
Status: DISCONTINUED | OUTPATIENT
Start: 2022-01-01 | End: 2022-01-01

## 2022-01-01 RX ORDER — LEVOTHYROXINE SODIUM 88 UG/1
88 TABLET ORAL DAILY
Qty: 30 TABLET | Refills: 3 | Status: SHIPPED | OUTPATIENT
Start: 2022-01-01 | End: 2022-01-01

## 2022-01-01 RX ORDER — PROCHLORPERAZINE EDISYLATE 5 MG/ML
5 INJECTION INTRAMUSCULAR; INTRAVENOUS EVERY 30 MIN PRN
Status: CANCELLED | OUTPATIENT
Start: 2022-01-01

## 2022-01-01 RX ADMIN — CARVEDILOL 6.25 MG: 6.25 TABLET, FILM COATED ORAL at 09:09

## 2022-01-01 RX ADMIN — MIDAZOLAM HYDROCHLORIDE 2 MG: 5 INJECTION, SOLUTION INTRAMUSCULAR; INTRAVENOUS at 10:07

## 2022-01-01 RX ADMIN — LEVOTHYROXINE SODIUM 88 MCG: 88 TABLET ORAL at 09:09

## 2022-01-01 RX ADMIN — PRAVASTATIN SODIUM 20 MG: 10 TABLET ORAL at 08:09

## 2022-01-01 RX ADMIN — SODIUM CHLORIDE: 9 INJECTION, SOLUTION INTRAVENOUS at 09:10

## 2022-01-01 RX ADMIN — CEFTRIAXONE SODIUM 1 G: 1 INJECTION, POWDER, FOR SOLUTION INTRAMUSCULAR; INTRAVENOUS at 12:08

## 2022-01-01 RX ADMIN — MELATONIN TAB 3 MG 6 MG: 3 TAB at 10:08

## 2022-01-01 RX ADMIN — CEFTRIAXONE SODIUM 1 G: 1 INJECTION, POWDER, FOR SOLUTION INTRAMUSCULAR; INTRAVENOUS at 02:09

## 2022-01-01 RX ADMIN — ENOXAPARIN SODIUM 30 MG: 30 INJECTION SUBCUTANEOUS at 04:09

## 2022-01-01 RX ADMIN — CARVEDILOL 6.25 MG: 6.25 TABLET, FILM COATED ORAL at 08:08

## 2022-01-01 RX ADMIN — CARVEDILOL 6.25 MG: 6.25 TABLET, FILM COATED ORAL at 08:09

## 2022-01-01 RX ADMIN — DARBEPOETIN ALFA 300 MCG: 300 INJECTION, SOLUTION INTRAVENOUS; SUBCUTANEOUS at 09:08

## 2022-01-01 RX ADMIN — LOSARTAN POTASSIUM 100 MG: 50 TABLET, FILM COATED ORAL at 09:09

## 2022-01-01 RX ADMIN — ASPIRIN 81 MG: 81 TABLET, COATED ORAL at 11:11

## 2022-01-01 RX ADMIN — DARBEPOETIN ALFA 300 MCG: 300 INJECTION, SOLUTION INTRAVENOUS; SUBCUTANEOUS at 04:10

## 2022-01-01 RX ADMIN — ASPIRIN 81 MG: 81 TABLET, COATED ORAL at 10:09

## 2022-01-01 RX ADMIN — DARBEPOETIN ALFA 300 MCG: 300 INJECTION, SOLUTION INTRAVENOUS; SUBCUTANEOUS at 11:09

## 2022-01-01 RX ADMIN — FAMOTIDINE 20 MG: 20 TABLET ORAL at 09:09

## 2022-01-01 RX ADMIN — ASPIRIN 81 MG: 81 TABLET, COATED ORAL at 09:09

## 2022-01-01 RX ADMIN — LOSARTAN POTASSIUM 100 MG: 50 TABLET, FILM COATED ORAL at 08:09

## 2022-01-01 RX ADMIN — CEPHALEXIN 500 MG: 500 CAPSULE ORAL at 11:09

## 2022-01-01 RX ADMIN — CALCIUM CARBONATE (ANTACID) CHEW TAB 500 MG 500 MG: 500 CHEW TAB at 08:09

## 2022-01-01 RX ADMIN — ACETAMINOPHEN 650 MG: 325 TABLET, FILM COATED ORAL at 11:11

## 2022-01-01 RX ADMIN — IOPAMIDOL 100 ML: 755 INJECTION, SOLUTION INTRAVENOUS at 11:08

## 2022-01-01 RX ADMIN — ASPIRIN 81 MG: 81 TABLET, COATED ORAL at 08:09

## 2022-01-01 RX ADMIN — TETANUS TOXOID, REDUCED DIPHTHERIA TOXOID AND ACELLULAR PERTUSSIS VACCINE, ADSORBED 0.5 ML: 5; 2.5; 8; 8; 2.5 SUSPENSION INTRAMUSCULAR at 03:11

## 2022-01-01 RX ADMIN — ISOSORBIDE MONONITRATE 60 MG: 60 TABLET, EXTENDED RELEASE ORAL at 09:09

## 2022-01-01 RX ADMIN — SODIUM CHLORIDE: 9 INJECTION, SOLUTION INTRAVENOUS at 01:07

## 2022-01-01 RX ADMIN — ACETAMINOPHEN 650 MG: 325 TABLET ORAL at 08:06

## 2022-01-01 RX ADMIN — DIPHENHYDRAMINE HYDROCHLORIDE 25 MG: 50 INJECTION, SOLUTION INTRAMUSCULAR; INTRAVENOUS at 09:10

## 2022-01-01 RX ADMIN — GADOBENATE DIMEGLUMINE 15 ML: 529 INJECTION, SOLUTION INTRAVENOUS at 01:09

## 2022-01-01 RX ADMIN — LEVOTHYROXINE SODIUM 88 MCG: 88 TABLET ORAL at 10:09

## 2022-01-01 RX ADMIN — DIPHENHYDRAMINE HYDROCHLORIDE 25 MG: 25 CAPSULE ORAL at 12:10

## 2022-01-01 RX ADMIN — PRAVASTATIN SODIUM 20 MG: 10 TABLET ORAL at 09:09

## 2022-01-01 RX ADMIN — ISOSORBIDE MONONITRATE 60 MG: 60 TABLET, EXTENDED RELEASE ORAL at 10:09

## 2022-01-01 RX ADMIN — ACETAMINOPHEN 500 MG: 500 TABLET ORAL at 04:11

## 2022-01-01 RX ADMIN — SODIUM CHLORIDE: 4.5 INJECTION, SOLUTION INTRAVENOUS at 07:08

## 2022-01-01 RX ADMIN — DARBEPOETIN ALFA 300 MCG: 300 INJECTION, SOLUTION INTRAVENOUS; SUBCUTANEOUS at 02:08

## 2022-01-01 RX ADMIN — FAMOTIDINE 20 MG: 20 TABLET ORAL at 10:09

## 2022-01-01 RX ADMIN — SODIUM CHLORIDE: 9 INJECTION, SOLUTION INTRAVENOUS at 10:07

## 2022-01-01 RX ADMIN — ISOSORBIDE MONONITRATE 60 MG: 60 TABLET, EXTENDED RELEASE ORAL at 08:09

## 2022-01-01 RX ADMIN — LEVOTHYROXINE SODIUM 88 MCG: 88 TABLET ORAL at 08:11

## 2022-01-01 RX ADMIN — ACETAMINOPHEN 650 MG: 325 TABLET, FILM COATED ORAL at 01:07

## 2022-01-01 RX ADMIN — LOSARTAN POTASSIUM 100 MG: 50 TABLET, FILM COATED ORAL at 10:09

## 2022-01-01 RX ADMIN — FAMOTIDINE 20 MG: 20 TABLET ORAL at 08:09

## 2022-01-01 RX ADMIN — ISOSORBIDE MONONITRATE 60 MG: 60 TABLET, EXTENDED RELEASE ORAL at 08:11

## 2022-01-01 RX ADMIN — FAMOTIDINE 20 MG: 20 TABLET ORAL at 01:08

## 2022-01-01 RX ADMIN — ENOXAPARIN SODIUM 30 MG: 30 INJECTION SUBCUTANEOUS at 05:09

## 2022-01-01 RX ADMIN — ENOXAPARIN SODIUM 30 MG: 30 INJECTION SUBCUTANEOUS at 05:08

## 2022-01-01 RX ADMIN — DIPHENHYDRAMINE HYDROCHLORIDE 25 MG: 50 INJECTION, SOLUTION INTRAMUSCULAR; INTRAVENOUS at 10:07

## 2022-01-01 RX ADMIN — ACETAMINOPHEN 650 MG: 325 TABLET, FILM COATED ORAL at 12:10

## 2022-01-01 RX ADMIN — CALCIUM CARBONATE (ANTACID) CHEW TAB 500 MG 500 MG: 500 CHEW TAB at 10:09

## 2022-01-01 RX ADMIN — ACETAMINOPHEN 500 MG: 500 TABLET ORAL at 08:11

## 2022-01-01 RX ADMIN — ACETAMINOPHEN 650 MG: 325 TABLET ORAL at 03:11

## 2022-01-01 RX ADMIN — PRAVASTATIN SODIUM 20 MG: 10 TABLET ORAL at 10:09

## 2022-01-01 RX ADMIN — LEVOTHYROXINE SODIUM 88 MCG: 88 TABLET ORAL at 08:09

## 2022-01-01 RX ADMIN — TRAMADOL HYDROCHLORIDE 25 MG: 50 TABLET, COATED ORAL at 11:11

## 2022-01-04 ENCOUNTER — HISTORICAL (OUTPATIENT)
Dept: INFUSION THERAPY | Facility: HOSPITAL | Age: 87
End: 2022-01-04

## 2022-01-04 LAB
ABS NEUT (OLG): 0.88 X10(3)/MCL (ref 2.1–9.2)
ALBUMIN SERPL-MCNC: 3.9 GM/DL (ref 3.4–4.8)
ALBUMIN/GLOB SERPL: 1.6 RATIO (ref 1.1–2)
ALP SERPL-CCNC: 81 UNIT/L (ref 40–150)
ALT SERPL-CCNC: 12 UNIT/L (ref 0–55)
ANISOCYTOSIS BLD QL SMEAR: ABNORMAL
AST SERPL-CCNC: 14 UNIT/L (ref 5–34)
BASOPHILS # BLD AUTO: 0.1 X10(3)/MCL (ref 0–0.2)
BASOPHILS NFR BLD AUTO: 3.8 %
BASOPHILS NFR BLD MANUAL: 3 % (ref 0–2)
BILIRUB SERPL-MCNC: 0.5 MG/DL
BILIRUBIN DIRECT+TOT PNL SERPL-MCNC: 0.2 MG/DL (ref 0–0.5)
BILIRUBIN DIRECT+TOT PNL SERPL-MCNC: 0.3 MG/DL (ref 0–0.8)
BUN SERPL-MCNC: 20.9 MG/DL (ref 9.8–20.1)
CALCIUM SERPL-MCNC: 8.9 MG/DL (ref 8.7–10.5)
CHLORIDE SERPL-SCNC: 107 MMOL/L (ref 98–111)
CO2 SERPL-SCNC: 27 MMOL/L (ref 23–31)
CREAT SERPL-MCNC: 0.82 MG/DL (ref 0.55–1.02)
CROSSMATCH INTERPRETATION: NORMAL
EOSINOPHIL # BLD AUTO: 0.2 X10(3)/MCL (ref 0–0.9)
EOSINOPHIL NFR BLD AUTO: 7.5 %
EOSINOPHIL NFR BLD MANUAL: 7 % (ref 0–8)
ERYTHROCYTE [DISTWIDTH] IN BLOOD BY AUTOMATED COUNT: 21 % (ref 11.5–17)
EST CREAT CLEARANCE SER (OHS): 31.59 ML/MIN
FERRITIN SERPL-MCNC: 422.66 NG/ML (ref 4.63–204)
GLOBULIN SER-MCNC: 2.5 GM/DL (ref 2.4–3.5)
GLUCOSE SERPL-MCNC: 113 MG/DL (ref 75–121)
GROUP & RH: NORMAL
HCT VFR BLD AUTO: 25.9 % (ref 37–47)
HGB BLD-MCNC: 8.4 GM/DL (ref 12–16)
IRON SATN MFR SERPL: 60 % (ref 20–50)
IRON SERPL-MCNC: 142 UG/DL (ref 50–170)
LYMPHOCYTES # BLD AUTO: 1.2 X10(3)/MCL (ref 0.6–4.6)
LYMPHOCYTES NFR BLD AUTO: 46.2 %
LYMPHOCYTES NFR BLD MANUAL: 47 % (ref 13–40)
MACROCYTES BLD QL SMEAR: ABNORMAL
MCH RBC QN AUTO: 35.7 PG (ref 27–31)
MCHC RBC AUTO-ENTMCNC: 32.4 GM/DL (ref 33–36)
MCV RBC AUTO: 110.2 FL (ref 80–94)
MONOCYTES # BLD AUTO: 0.2 X10(3)/MCL (ref 0.1–1.3)
MONOCYTES NFR BLD AUTO: 8.3 %
MONOCYTES NFR BLD MANUAL: 8 % (ref 2–11)
NEUTROPHILS # BLD AUTO: 0.9 X10(3)/MCL (ref 2.1–9.2)
NEUTROPHILS NFR BLD AUTO: 33.1 %
NEUTROPHILS NFR BLD MANUAL: 35 % (ref 47–80)
PLATELET # BLD AUTO: 261 X10(3)/MCL (ref 130–400)
PLATELET # BLD EST: NORMAL 10*3/UL
PMV BLD AUTO: 12.8 FL (ref 9.4–12.4)
POIKILOCYTOSIS BLD QL SMEAR: ABNORMAL
POLYCHROMASIA BLD QL SMEAR: ABNORMAL
POTASSIUM SERPL-SCNC: 4.5 MMOL/L (ref 3.5–5.1)
PRODUCT READY: NORMAL
PROT SERPL-MCNC: 6.4 GM/DL (ref 5.8–7.6)
RBC # BLD AUTO: 2.35 X10(6)/MCL (ref 4.2–5.4)
RBC MORPH BLD: ABNORMAL
SODIUM SERPL-SCNC: 142 MMOL/L (ref 132–146)
TIBC SERPL-MCNC: 236 UG/DL (ref 250–450)
TIBC SERPL-MCNC: 94 UG/DL (ref 70–310)
TRANSFERRIN SERPL-MCNC: 198 MG/DL
TRANSFUSION ORDER: NORMAL
WBC # SPEC AUTO: 2.7 X10(3)/MCL (ref 4.5–11.5)

## 2022-04-18 NOTE — PROGRESS NOTES
C3 nurse attempted to contact Edith Alvarado's daughter for a TCC post hospital discharge follow up call. The patient is unable to conduct the call @ this time. The patient requested a callback.    C3 nurse attempted to contact Edith Schmidts daughter for a TCC post hospital discharge follow up call. The patient is unable to conduct the call @ this time. The patient requested a callback.    C3 nurse spoke with Edith Salcido daughter for a TCC post hospital discharge follow up call. The patient reports does not have a scheduled HOSFU appointment. C3 nurse was unable to schedule HOSFU appointment for [facility]. Patient advised to contact their PCP to schedule a HOSPFU within 7 days. Outside NP service area.

## 2022-04-30 NOTE — PROGRESS NOTES
Patient:   Edith Alvarado            MRN: [id number]            FIN: [id number]               Age:   89 years     Sex:  Female     :  1926   Associated Diagnoses:   None   Author:   Mervin JAQUEZ MD, Philippe E        Referring Physician:  Tiffany Cameron MD  PCP:  Same      Visit Information     Problem List:   1.  Leukopenia  2.  Neutropenia  3.  Anemia  4.  Macrocytosis    Treatment Plan:  Observation    Diagnosis/Treatment/HPI:   89-year-old female who presented to see me for leukopenia/neutropenia, and macrocytic anemia.  Her anemia dates back to 2013.  Her macrocytosis started on 8/10/2017, her anemia persisted and she became leukopenic on 2018.  Her B12 on 2019 was normal at 476, on 2019 was 963, and on 2020 was 1012.  She was referred to hematology for further evaluation.  She presented to see me initially on 2020.  At that visit, she had vision changes, decreased hearing, dental problems, swelling in her legs, constipation, dark stools, hemorrhoids, urinary frequency/urgency, and some easy bruising.  She denied any fatigue, weight loss, fever, chills, night sweats, chest pain, shortness of breath, cough, trouble swallowing, abdominal pain, nausea, vomiting, diarrhea, hematuria, dysuria, numbness/tingling.  Work-up on that day revealed normal folic acid, ceruloplasmin, and copper.  Her hemoglobin on that day was 9.6.  At that visit, we discussed bone marrow biopsy versus observation, plan was for observation every 3 months.  Patient did not follow up with me after her initial visit.  She was following with her PCP.  Lab work done on 2021 that revealed a hemoglobin of 7.5, she was admitted to the emergency department, underwent a blood transfusion of 2 units of packed red blood cells, follow-up CBC on 2021 showed increase in hemoglobin up to 10.5.  She represented to see me on 2021,  hemoglobin on that day was 9.6.            No qualifying data  available        Chief Complaint   9/14/2021 10:46 CDT      patient stated that she still feels a little tired/short of breath since last blood transfusion      Interval History   Patient presents to clinic for a follow up appointment via walker; daughter present.  Overall, she reports fatigue and shortness of breath.  Denies any bright red blood per rectum.  She does have black stool, but recently started on iron.  Otherwise, no major issues or complaints.  She still lives alone, is able to perform all of her activities of daily living without limitation or assistance.      PMHx:  1.  Hypertension  2.  Coronary artery disease  3.  Thyroid disease  4.  Anemia  5.  Leukopenia     PSHx:   1.  2 stents placed on 5/24/2010  2.  2 stents placed on 8/26/2010    Social Hx:   The patient is , retired, previously worked as an , denies tobacco.  She drinks 3 beers a week.  Fun fact, the patient has 8 children, 17 grandchildren, 37 great-grandchildren, and has 6 great great grandchildren.    Family Hx:   She has a family history of diabetes, heart disease, cancer, hypertension, blood clots, and mental illness.         Review of Systems   14 point review of systems done in full with pertinent positives as described above  Remainder of review systems done in full and unremarkable.      Health Status   Allergies:    Allergic Reactions (Selected)  High  Lortab- Hives.  Severity Not Documented  Codeine- Rash.  Ranexa- Unknown.,    Allergies (3) Active Reaction  Lortab Hives  codeine Rash  Ranexa Unknown     Current medications:  (Selected)   Prescriptions  Prescribed  Coreg 3.125 mg oral tablet: 3.125 mg = 1 tab(s), Oral, BID, # 60 tab(s), 11 Refill(s)  Flonase 50 mcg/inh nasal spray: 1 spray(s), Nasal, Daily, in each nostril, # 16 gm, 11 Refill(s), Pharmacy: [facility] #[id number], 156, cm, Height/Length Dosing, 01/25/21 10:13:00 CST, 60.2, kg, Weight Dosing, 01/25/21 10:13:00 CST  lactulose 10 g/15 mL  oral syrup: 10 gm = 15 mL, Oral, Daily, PRN PRN as needed for constipation, # 480 mL, 5 Refill(s), Pharmacy: [facility], 156, cm, Height/Length Dosing, 06/16/21 11:13:00 CDT, 60.78, kg, Weight Dosing, 06/16/21 11:07:00 CDT  levothyroxine 88 mcg (0.088 mg) oral tablet: See Instructions, TAKE ONE TABLET BY MOUTH EVERY DAY FOR THYROID, # 30 tab(s), 5 Refill(s), Pharmacy: [facility], 156, cm, Height/Length Dosing, 06/16/21 11:13:00 CDT, 60.78, kg, Weight Dosing, 06/16/21 11:07:00 CDT  losartan 100 mg oral tablet: See Instructions, TAKE ONE TABLET BY MOUTH ONCE DAILY FOR BLOOD PRESSURE, # 90 tab(s), 1 Refill(s), Pharmacy: [facility] #[id number], 156, cm, Height/Length Dosing, 01/25/21 10:13:00 CST, 60.2, kg, Weight Dosing, 01/25/21 10:13:00 CST  rollator walker: rollator walker, See Instructions, please dispense one rollator walker dx:M21.70- need lifetime, # 1 EA, 0 Refill(s)  shoe inserts: shoe inserts, See Instructions, please dispense/fit patient for shoe inserts due to leg length discrepancy per home health PT dx M21.70, # 1 EA, 0 Refill(s)  triamcinolone 0.1% topical cream: 1 doris, TOP, TID, apply a thin film  to affected area, # 30 gm, 5 Refill(s), Pharmacy: [facility], 156, cm, Height/Length Dosing, 08/24/20 13:29:00 CDT, 60.2, kg, Weight Dosing, 08/24/20 13:29:00 CDT  Documented Medications  Documented  Dulcolax Stool Softener 100 mg oral capsule: 200 mg = 2 cap(s), Oral, TID, PRN PRN for constipation, 0 Refill(s)  ISOSORB MONO TAB 60MG ER: 60 mg = 1 tab(s), Oral, Daily  Pravastatin 20 mg Oral Tab: 20 mg = 1 tab(s), Oral, Daily, # 90 tab(s), 0 Refill(s)  PreserVision oral tablet: 1 tab(s), Oral, Daily, # 90 tab(s), 0 Refill(s)  Tylenol 8 HR Arthritis Pain 650 mg oral tablet, extended release: 1,300 mg = 2 tab(s), Oral, q8hr, 0 Refill(s)  Vitamin D3 5000 intl units oral capsule: 5,000 IntUnit = 1 cap(s), Oral, Daily, with food, # 100 cap(s), 0 Refill(s)  aspirin 81 mg oral Delayed Release  (EC) tablet: 81 mg = 1 tab(s), Oral, Daily  calcium-vitamin D 600 mg-500 intl units oral tablet, extended release: 1 tab(s), Oral, Once, # 80 tab(s), 0 Refill(s)  magnesium oxide 500 mg oral tablet: 500 mg = 1 tab(s), Oral, Daily, 0 Refill(s)   Problem list:    All Problems  CAD (coronary artery disease) / 414.00 / Confirmed  HTN (hypertension) / 401.9 / Confirmed  High cholesterol / 856951816 / Confirmed  hypothyroidism / 68773062 / Confirmed  Seborrheic dermatitis / 08158321 / Confirmed  Anemia / 525553759 / Confirmed  Lumbar stenosis / 11564150 / Confirmed  Osteopenia / 911826794 / Confirmed  Wellness examination / 042590728 / Confirmed  Body tinea / 780185627 / Confirmed  Mobility impaired / 779095465 / Confirmed  Postmenopause / 191539883 / Confirmed  Insomnia / 318465863 / Confirmed  Constipation / 02041025 / Confirmed  Urinary urgency / 541434582 / Confirmed  Dysuria / 43252399 / Confirmed  Osteoporosis screening declined / 9430348144 / Confirmed  Leukopenia / 834881520 / Confirmed  Neutropenia / 315204760 / Confirmed  Resolved: IRREGULAR HEART BEAT / 926331965  Resolved: kidney stone / 824752491  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  Resolved: At risk of pressure sore / 436262806  Resolved: At risk for falls / 138835038  Resolved: At risk for infection / 158773235  Resolved: Impaired mobility / 967876228  Resolved: Chronic diarrhea / 762228624  Resolved: Fecal incontinence / 812419374  Resolved: Osteoporosis / 604306142,    Active Problems (19)  Anemia   Body tinea   CAD (coronary artery disease)   Constipation   Dysuria   High cholesterol   HTN (hypertension)   hypothyroidism   Insomnia   Leukopenia   Lumbar stenosis   Mobility impaired   Neutropenia   Osteopenia   Osteoporosis screening declined   Postmenopause   Seborrheic dermatitis   Urinary urgency   Wellness examination         Histories   Past Medical History:    Active  CAD (coronary artery disease) (414.00)  HTN (hypertension) (401.9)  High  cholesterol (113176776)  hypothyroidism (01454340)  Resolved  IRREGULAR HEART BEAT (440708870):  Resolved.  kidney stone (278082520):  Resolved.  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  Chronic diarrhea (174951165):  Resolved.  Fecal incontinence (355762616):  Resolved.  Osteoporosis (564251487):  Resolved.   Family History:    History is unknown.   Procedure history:    Bone density scan report (SNOMED CT 9980160910) performed by Jarad Clark MD on 6/1/2018 at 89 Years.  Bone density scan (SNOMED CT 896734679) on 6/1/2018 at 89 Years.  Comments:  6/4/2018 13:12 CDT - Fannie Wooten LPN  BCA  stent placement performed by dr. bustamante in the month of 11/2017 at 89 Years.  right shoulder sx. in 2014 at 88 Years.  Cholecystectomy; (CPT4 62122).  Appendectomy; (CPT4 45704).  partial abdominal hysterectomy (corpus and cervix), with or without removal of tube(s), with or without removal of ovary(s); with colpo-urethrocystopexy (eg, Gjcqsmqy-Xtxoyzayb-Sspjse, Silver) (CPT4 59272).  angiogram.  colonoscopy.  laser eye surgery.  cataracts.  bladder suspension.  Comments:  3/24/2013 11:43 CDT - Josafat CLIFTON , Carl WARD  x2  Placement of stent in cardiac conduit (SNOMED CT 9292357516).  Comments:  7/17/2015 14:25 CDT - Rahat RTSimi  x2      Physical Examination   Vital Signs   9/14/2021 10:51 CDT      Temperature Oral          36.2 DegC                             Temperature Oral (calculated)             97.16 DegF                             Peripheral Pulse Rate     81 bpm                             Respiratory Rate          18 br/min                             SpO2                      99 %                             Oxygen Therapy            Room air                             Systolic Blood Pressure   130 mmHg                             Diastolic Blood Pressure  70 mmHg                             Blood Pressure Location   Left arm                             Manual Cuff BP            No                              O2 SAT at rest            99 %        No qualifying data available   General:  Alert and oriented, No acute distress.    Eye:  Extraocular movements are intact, Normal conjunctiva.    HENT:  Normocephalic, Oral mucosa is moist.    Neck:  Supple, No lymphadenopathy.    Respiratory:  Lungs are clear to auscultation, Respirations are non-labored, Breath sounds are equal.    Cardiovascular:  Normal rate, Regular rhythm, No edema.    Gastrointestinal:  Soft, Non-tender, Non-distended, Normal bowel sounds.    Integumentary:  Warm, Dry, Intact.    Neurologic:  Alert, Oriented, Normal sensory, No focal deficits.    Psychiatric:  Cooperative, Appropriate mood & affect.    Lymphatics:  No lymphadenopathy neck, axilla, groin.    Cognition and Speech:  Oriented, Speech clear and coherent.       Review / Management   Results review      Impression and Plan   Diagnoses:  1.  Leukopenia  2.  Neutropenia  3.  Anemia  4.  Macrocytosis    Plan:  At this point, the patient has had a chronic macrocytic anemia and a chronic leukopenia, that has slightly worsened over the last year.  She has required 1 blood transfusion of 2 units pRBCs, done on 8/9/2021.    I explained to her that there is a chance that she would have myelodysplastic syndrome due to her age and blood work findings.  I offered a bone marrow biopsy now vs. follow up with CBC in 8 weeks and if worsening hemoglobin, then ordering bone marrow biopsy then.  Patient and family opted to be seen in 8 weeks.      I explained that the way to diagnose MDS would be a bone marrow biopsy.    If her cytopenias are worse next visit, I would want to do a bone marrow biopsy for a reason such as if she had a 5q deletion alone she could be treated with Revlimid, or if she had something like ring sideroblasts present on her bone marrow, she would be a candidate for something such as luspatercept.  Patient and family voiced understanding.    CBC, CMP, Iron studies  today.    Return to clinic in 8 weeks with repeat labs and for OV and clinical examination    Labs: CBC, CMP, Iron studies    All questions were answered to the best of my ability.   Jeremiah Jeffries II, MD           Professional Services   I, Mara Matamoros LPN, acted solely as a scribe for and in the presence of, Dr. Jeremiah Jeffries who performed the service.

## 2022-04-30 NOTE — ED PROVIDER NOTES
Patient:   Edith Alvarado            MRN: [id number]            FIN: [id number]               Age:   89 years     Sex:  Female     :  1926   Associated Diagnoses:   Chest pain   Author:   Leidy Samayoa MD      Basic Information   Time seen: Date & time 10/6/2017 09:45:00.      History of Present Illness   The patient presents with chest pain.  The onset was just prior to arrival.  The course/duration of symptoms is episodic and fluctuating in intensity.  Location: Central substernal chest. Radiating pain: left arm. The character of symptoms is shooting type pain. No radiation to anywhere. Pain was very brief. Took nystop at home and seemed to help..  Associated symptoms: nausea, weak  , Numbness down left arm, denies shortness of breath and denies vomiting.  Additional history: she is on clopidogrel.        Review of Systems   Constitutional symptoms:  Negative except as documented in HPI.   Skin symptoms:  Negative except as documented in HPI.   Eye symptoms:  Negative except as documented in HPI.   ENMT symptoms:  Negative except as documented in HPI.   Respiratory symptoms:  Negative except as documented in HPI.   Cardiovascular symptoms:  Negative except as documented in HPI.   Gastrointestinal symptoms:  Negative except as documented in HPI.   Genitourinary symptoms:  Negative except as documented in HPI.   Musculoskeletal symptoms:  Negative except as documented in HPI.   Neurologic symptoms:  Negative except as documented in HPI.             Additional review of systems information: All systems reviewed as documented in chart.      Health Status   Allergies:    Allergic Reactions (Selected)  Severity Not Documented  Codeine- No reactions were documented.,    Allergies (1) Active Reaction  codeine None Documented  .   Medications:  (Selected)   Prescriptions  Prescribed  cetirizine 10 mg oral tablet: See Instructions, TAKE 1 TABLET BY MOUTH EVERY DAY, # 30 tab(s), 2 Refill(s), eRx:  [facility]/pharmacy #[id number]  levothyroxine 88 mcg (0.088 mg) oral tablet: 88 mcg = 1 tab(s), Oral, Daily, # 90 tab(s), 1 Refill(s), [id number]  nystatin 100,000 units/g topical cream: 1 doris, TOP, TID, X 14 day(s), # 15 gm, 0 Refill(s), [id number]  nystatin 100,000 units/g topical powder: 1 doris, TOP, BID, X 14 day(s), # 15 gm, 0 Refill(s), [id number]  pravastatin 80 mg oral tablet: 80 mg = 1 tab(s), Oral, Daily, # 30 tab(s), 5 Refill(s), [id number]  Documented Medications  Documented  CLOPIDOGREL  TAB 75M mg = 1 tab(s), Oral, Daily  ISOSORB MONO TAB 30MG ER: 30 mg = 1 tab(s), Oral, Daily  One-A-Day Women oral tablet: 1 tab(s), Oral, Daily, # 30 tab(s), 0 Refill(s)  PreserVision oral tablet: 1 tab(s), Oral, Daily, # 30 tab(s), 0 Refill(s)  VALSART/HCTZ -25M tab(s), Oral, Daily  Vitamin B12 500 mcg oral tablet: 500 mcg = 1 tab(s), Oral, Daily, # 30 tab(s), 0 Refill(s)  aspirin 81 mg oral tablet, CHEWABLE: 81 mg, Oral, Daily, 0 Refill(s).      Past Medical/ Family/ Social History   Medical history:    Active  CAD (coronary artery disease) (414.00)  HTN (hypertension) (401.9)  High cholesterol (543219822)  hypothyroidism (92875781)  Resolved  IRREGULAR HEART BEAT (831420452):  Resolved.  kidney stone (125900009):  Resolved.  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   Surgical history:    right shoulder sx. in 2014 at 88 Years.  Cholecystectomy; (59777).  Appendectomy; (83532).  partial abdominal hysterectomy (corpus and cervix), with or without removal of tube(s), with or without removal of ovary(s); with colpo-urethrocystopexy (eg, Adrianne Payne) (88857).  angiogram.  colonoscopy.  laser eye surgery.  cataracts.  bladder suspension.  Comments:  3/24/2013 11:43 - Josafat CLIFTON , Carl PATIÑO.  x2  Placement of stent in cardiac conduit ([id number]).  Comments:  2015 14:25 - Rahat RT, Simi RICH  x2.   Family history:     History is unknown..   Social history:    Social & Psychosocial Habits    Alcohol  03/24/2013 Risk Assessment: Medium Risk    03/24/2013  Use: Current    Type: Beer    Frequency: Daily    Comment: 1 beer daily - 03/24/2013 11:35 - Carl Maurice RN    Employment/School  03/24/2013 Risk Assessment: No Risk    12/21/2015  Status: Unemployed    Exercise  03/24/2013 Risk Assessment: Occasional exercise      Comment: DOES NOT EXERCISE - 12/21/2015 14:44 - Je CUNNINGHAM, Fannie    Home/Environment  03/24/2013 Risk Assessment: Low Risk    03/24/2013  Lives with: Alone    Nutrition/Health  03/24/2013 Risk Assessment: No Risk    12/21/2015  Type of diet: Regular    Sexual  12/21/2015  Sexually active: No    Substance Abuse  03/24/2013 Risk Assessment: Denies Substance Abuse    12/21/2015  Use: Never    Tobacco  03/24/2013 Risk Assessment: Denies Tobacco Use    07/17/2015  Use: Never smoker  .   Problem list:    Active Problems (7)  CAD (coronary artery disease)   Chronic diarrhea   Fecal incontinence   High cholesterol   HTN (hypertension)   hypothyroidism   Seborrheic dermatitis   .      Physical Examination               Vital Signs             Time:  11/23/2015 07:10:00.   Vital Signs   10/6/2017 9:43 CDT       Temperature Oral          36.5 DegC                             Peripheral Pulse Rate     84 bpm                             Respiratory Rate          20 br/min                             SpO2                      99 %                             Oxygen Therapy            Room air                             Systolic Blood Pressure   178 mmHg  HI                             Diastolic Blood Pressure  124 mmHg  HI  .      No qualifying data available.   repeated /70 and patient continues to be chest pain free.   General:  Alert, no acute distress, Non toxic appearing.    Skin:  Warm, dry, mild rash in right groin consistent with yeast. Patient reports this is much improved.    Head:  Normocephalic,  atraumatic.    Neck:  Trachea midline.   Eye:  Normal conjunctiva.   Ears, nose, mouth and throat:  Oral mucosa moist.   Cardiovascular:  Regular rate and rhythm, No murmur, No edema.    Respiratory:  Lungs are clear to auscultation, respirations are non-labored, breath sounds are equal.    Gastrointestinal:  Soft, Nontender, Non distended, Normal bowel sounds.    Musculoskeletal:  No tenderness, no swelling.    Neurological:  Alert and oriented to person, place, time, and situation, normal speech observed.    Psychiatric:  Cooperative, appropriate mood & affect.       Medical Decision Making   Orders  Launch Orders   Laboratory:  BNP (Order): Stat collect, 10/6/2017 9:46 CDT, Blood, Lab Collect, 10/6/2017 9:46 CDT  Troponin-I (Order): Stat collect, 10/6/2017 9:46 CDT, Blood, Lab Collect, 10/6/2017 9:46 CDT  CMP (Order): Stat collect, 10/6/2017 9:46 CDT, Blood, Lab Collect, 10/6/2017 9:46 CDT  CBC w/ Auto Diff (Order): Stat collect, 10/6/2017 9:46 CDT, Blood, Lab Collect, 10/6/2017 9:46 CDT  PTT (Order): Stat collect, 10/6/2017 9:46 CDT, Blood, Lab Collect, 10/6/2017 9:46 CDT  PT (Order): Stat collect, 10/6/2017 9:46 CDT, Blood, Lab Collect, 10/6/2017 9:46 CDT  Patient Care:  Saline Lock Insert (Order): 10/6/2017 9:46 CDT  Contact MD for order for Aspirin and NTG. (Order): 10/6/2017 9:46 CDT, Contact MD for order for Aspirin and NTG.  Pulse Oximetry (Order): 10/6/2017 9:46 CDT, Place on pulse oximetry.  Monitor oxygen saturation.  Cardiac Monitoring (Order): 10/6/2017 9:46 CDT, Constant Order, Place on telemetry.  Blood Pressure (Order): 10/6/2017 9:46 CDT, Monitor blood pressure.  Pharmacy:  aspirin (Order): 325 mg, Oral, Now, first dose 10/6/2017 9:46 CDT, stop date 10/6/2017 9:46 CDT, [phone]  Radiology:  XR Chest 1 View (Order): Stat, 10/6/2017 9:46 CDT, Chest Pain, None, Patient Bed, Patient Has IV?, Rad Type  Cardiology:  EKG Adult 12 Lead (Order): 10/6/2017 9:46 CDT, Stat, Chest Pain, Patient Bed, Patient Has  IV, Show to provider upon completion., -1, -1, 10/6/2017 9:46 CDT  Respiratory Therapy:  Oxygen Therapy (Order): 10/6/2017 9:46 CDT, Nasal Cannula, Maintain O2 saturation > 93%., CM Oxygen.   Electrocardiogram:  Time 10/6/2017 09:48:00, rate 76, normal sinus rhythm, When compared to EKG from 2013, morphology very similiar without new elevations. Artifact make it difficult to interpret V5, V6. One PVC noted, otherwise regular. LBBB old..    Results review:  Lab results : Lab View   10/6/2017 9:25 CDT       Sodium Lvl                143 mmol/L                             Potassium Lvl             4.0 mmol/L                             Chloride                  107 mmol/L                             CO2                       28.0 mmol/L                             Calcium Lvl               8.8 mg/dL                             Glucose Lvl               132 mg/dL  HI                             BUN                       26.0 mg/dL  HI                             Creatinine                0.96 mg/dL                             eGFR-AA                   >60 mL/min/1.73 m2  NA                             eGFR-RICH                  58 mL/min/1.73 m2  NA                             Bili Total                0.6 mg/dL                             Bili Direct               0.20 mg/dL                             Bili Indirect             0.40 mg/dL                             AST                       15 unit/L                             ALT                       17 unit/L                             Alk Phos                  89 unit/L                             Total Protein             7.4 gm/dL                             Albumin Lvl               4.1 gm/dL                             Globulin                  3 gm/dL                             A/G Ratio                 1  NA                             Troponin-I                <0.015 ng/mL                             TSH                       1.290 mIU/mL                              PT                        10.3 second(s)                             INR                       0.9  NA                             PTT                       21 second(s)                             WBC                       6.6 x10(3)/mcL                             RBC                       3.64 x10(6)/mcL  LOW                             Hgb                       11.6 gm/dL  LOW                             Hct                       34.5 %  LOW                             Platelet                  192 x10(3)/mcL                             MCV                       94.8 fL  HI                             MCH                       31.9 pg  HI                             MCHC                      33.6 gm/dL                             RDW                       13.4 %                             MPV                       12.1 fL  HI                             Abs Neut                  3.03 x10(3)/mcL                             Neutro Auto               45.7 %                             Lymph Auto                43.4 %                             Mono Auto                 8.1 %                             Eos Auto                  2.3 %                             Abs Eos                   0.15 x10(3)/mcL                             Basophil Auto             0.2 %                             Abs Neutro                3.03 x10(3)/mcL                             Abs Lymph                 2.88 x10(3)/mcL                             Abs Mono                  0.54 x10(3)/mcL                             Abs Baso                  0.01 x10(3)/mcL                             NRBC%                     0.0 %                             IG%                       0.300 %                             IG#                       0.0200  HI  .   Radiology results:  Rad Results (ST)  < 12 hrs   Accession: [id number]  Order: XR Chest 1 View  Report Dt/Tm: 10/06/2017 10:38  Report:   Clinical History  Chest pain      Technique  Portable Single view AP radiograph of the chest.     Comparison  The 20th 2017.     Findings  No focal opacification, pleural effusion, or pneumothorax. The  cardiomediastinal silhouette is within normal limits. No acute osseous  abnormality.     IMPRESSION:  No acute cardiopulmonary process.       .   Notes:  No pain currently. Mild to moderate at worse. Has a hx of 2 stents. Sees Dr. Gao. Was scheduled to do a cath last week but had a yeast infection in groin so it was pushed back. .      Reexamination/ Reevaluation   Time: 10/6/2017 10:52:00 .   Vital signs   results included from flowsheet : Vital Signs   10/6/2017 10:57 CDT      Peripheral Pulse Rate     70 bpm                             Respiratory Rate          18 br/min                             SpO2                      99 %                             Oxygen Therapy            Room air                             Systolic Blood Pressure   142 mmHg  HI                             Diastolic Blood Pressure  63 mmHg     Pain status: unchanged.   Notes: Pain free and BP slowly coming down. Will hold nitro at this time.      Impression and Plan   Diagnosis   Chest pain ([id number] R07.9)      Calls-Consults   -  10/6/2017 10:59:00 , [facility], Spoke with Gideon. Plan to admit for rule out.  Will reasses for possible angiogram. .    Plan   Disposition: Admit time  10/6/2017 11:18:00, Place in Observation Telemetry Unit.    Counseled: Patient, Family.

## 2022-04-30 NOTE — PROGRESS NOTES
Patient:   Edith Alvarado            MRN: [id number]            FIN: [id number]               Age:   89 years     Sex:  Female     :  1926   Associated Diagnoses:   None   Author:   Lucia DC, Risa OLIVEIRA        Referring Physician:  Tiffany Cameron MD  PCP:  Same      Visit Information     Problem List:   1.  Leukopenia  2.  Neutropenia  3.  Anemia  4.  Macrocytosis    Treatment Plan:  Observation    Diagnosis/Treatment/HPI:   89-year-old female who presented to see me for leukopenia/neutropenia, and macrocytic anemia.  Her anemia dates back to 2013.  Her macrocytosis started on 8/10/2017, her anemia persisted and she became leukopenic on 2018.  Her B12 on 2019 was normal at 476, on 2019 was 963, and on 2020 was 1012.  She was referred to hematology for further evaluation.  She presented to see me initially on 2020.  At that visit, she had vision changes, decreased hearing, dental problems, swelling in her legs, constipation, dark stools, hemorrhoids, urinary frequency/urgency, and some easy bruising.  She denied any fatigue, weight loss, fever, chills, night sweats, chest pain, shortness of breath, cough, trouble swallowing, abdominal pain, nausea, vomiting, diarrhea, hematuria, dysuria, numbness/tingling.  Work-up on that day revealed normal folic acid, ceruloplasmin, and copper.  Her hemoglobin on that day was 9.6.  At that visit, we discussed bone marrow biopsy versus observation, plan was for observation every 3 months.  Patient did not follow up with me after her initial visit.  She was following with her PCP.  Lab work done on 2021 that revealed a hemoglobin of 7.5, she was admitted to the emergency department, underwent a blood transfusion of 2 units of packed red blood cells, follow-up CBC on 2021 showed increase in hemoglobin up to 10.5.  She represented to see me on 2021,  hemoglobin on that day was 9.6.            Chief Complaint   Follow Up       Interval History   Patient presents to clinic for a follow up appointment via walker; daughter present.  She continues to reports fatigue and shortness of breath.  Denies recent fevers/infections. Denies HA, SOB, CP, N/V/C/D. Denies any bright red blood per rectum.  CBC today shows slight decline in WBC/ANC to 2.7/0.8 respectively. She does have a clinical history of fluctuating cytopenias. Anemia is stable overall following previous transfusion at previous clinic visit.   We discussed option of observation vs bone marrow biopsy - patient would like to hold off on biopsy at this time. We will continue to keep a close eye on her blood counts.       PMHx:  1.  Hypertension  2.  Coronary artery disease  3.  Thyroid disease  4.  Anemia  5.  Leukopenia     PSHx:   1.  2 stents placed on 5/24/2010  2.  2 stents placed on 8/26/2010    Social Hx:   The patient is , retired, previously worked as an , denies tobacco.  She drinks 3 beers a week.  Fun fact, the patient has 8 children, 17 grandchildren, 37 great-grandchildren, and has 6 great great grandchildren.    Family Hx:   She has a family history of diabetes, heart disease, cancer, hypertension, blood clots, and mental illness.         Review of Systems   14 point review of systems done in full with pertinent positives as described above  Remainder of review systems done in full and unremarkable.      Health Status   Allergies:    Allergic Reactions (Selected)  High  Lortab- Hives.  Severity Not Documented  Codeine- Rash.  Ranexa- Unknown.,    Allergies (3) Active Reaction  Lortab Hives  codeine Rash  Ranexa Unknown     Current medications:  (Selected)   Outpatient Medications  Future  Tylenol: 650 mg, form: Tab, Oral, Once-chemo, *Est. first dose 01/04/22 11:33:00 CST, *Est. stop date 01/04/22 11:33:00 CST, Routine, Pre-Med for Transfusion, Days 1, Future Order  Prescriptions  Prescribed  Coreg 3.125 mg oral tablet: 3.125 mg = 1 tab(s), Oral,  BID, # 60 tab(s), 11 Refill(s)  Flonase 50 mcg/inh nasal spray: 1 spray(s), Nasal, Daily, in each nostril, # 16 gm, 11 Refill(s), Pharmacy: [facility] #[id number], 156, cm, Height/Length Dosing, 01/25/21 10:13:00 CST, 60.2, kg, Weight Dosing, 01/25/21 10:13:00 CST  lactulose 10 g/15 mL oral syrup: 10 gm = 15 mL, Oral, Daily, PRN PRN as needed for constipation, # 480 mL, 5 Refill(s), Pharmacy: [facility], 156, cm, Height/Length Dosing, 06/16/21 11:13:00 CDT, 60.78, kg, Weight Dosing, 06/16/21 11:07:00 CDT  levothyroxine 88 mcg (0.088 mg) oral tablet: See Instructions, TAKE ONE TABLET BY MOUTH EVERY DAY FOR THYROID, # 30 tab(s), 1 Refill(s), Pharmacy: [facility], 156, cm, Height/Length Dosing, 11/10/21 8:46:00 CST, 61.6, kg, Weight Dosing, 11/10/21 8:46:00 CST  losartan 100 mg oral tablet: See Instructions, TAKE ONE TABLET BY MOUTH ONCE DAILY FOR BLOOD PRESSURE, # 90 tab(s), 1 Refill(s), Pharmacy: [facility] #[id number], 156, cm, Height/Length Dosing, 01/25/21 10:13:00 CST, 60.2, kg, Weight Dosing, 01/25/21 10:13:00 CST  mupirocin 2% topical ointment: 1 doris, TOP, TID, apply a thin film to toe, # 22 gm, 1 Refill(s), Pharmacy: [facility], 156, cm, Height/Length Dosing, 09/14/21 10:52:00 CDT, 60.1, kg, Weight Dosing, 09/14/21 10:52:00 CDT  rollator walker: rollator walker, See Instructions, please dispense one rollator walker dx:M21.70- need lifetime, # 1 EA, 0 Refill(s)  shoe inserts: shoe inserts, See Instructions, please dispense/fit patient for shoe inserts due to leg length discrepancy per home health PT dx M21.70, # 1 EA, 0 Refill(s)  Documented Medications  Documented  Dulcolax Stool Softener 100 mg oral capsule: 200 mg = 2 cap(s), Oral, TID, PRN PRN for constipation, 0 Refill(s)  ISOSORB MONO TAB 60MG ER: 60 mg = 1 tab(s), Oral, Daily  Pravastatin 20 mg Oral Tab: 20 mg = 1 tab(s), Oral, Daily, # 90 tab(s), 0 Refill(s)  PreserVision oral tablet: 1 tab(s), Oral, Daily, # 90 tab(s), 0  Refill(s)  Tylenol 8 HR Arthritis Pain 650 mg oral tablet, extended release: 1,300 mg = 2 tab(s), Oral, q8hr, 0 Refill(s)  Vitamin D3 5000 intl units oral capsule: 5,000 IntUnit = 1 cap(s), Oral, Daily, with food, # 100 cap(s), 0 Refill(s)  aspirin 81 mg oral Delayed Release (EC) tablet: 81 mg = 1 tab(s), Oral, Daily  calcium-vitamin D 600 mg-500 intl units oral tablet, extended release: 1 tab(s), Oral, Once, # 80 tab(s), 0 Refill(s)  magnesium oxide 500 mg oral tablet: 500 mg = 1 tab(s), Oral, Daily, 0 Refill(s)   Problem list:    All Problems  Anemia / SNOMED CT 816498427 / Confirmed  CAD (coronary artery disease) / ICD-9-.00 / Confirmed  Constipation / SNOMED CT 01065039 / Confirmed  Body tinea / SNOMED CT 374890034 / Confirmed  Dysuria / SNOMED CT 90872635 / Confirmed  HTN (hypertension) / ICD-9-.9 / Confirmed  hypothyroidism / SNOMED CT 65568535 / Confirmed  Mobility impaired / SNOMED CT 187257308 / Confirmed  Insomnia / SNOMED CT 638468654 / Confirmed  Leukopenia / SNOMED CT 153499558 / Confirmed  Neutropenia / SNOMED CT 150889876 / Confirmed  Osteopenia / SNOMED CT 902137657 / Confirmed  Osteoporosis screening declined / SNOMED CT 1775875757 / Confirmed  Wellness examination / SNOMED CT 959981499 / Confirmed  Postmenopause / SNOMED CT 383299402 / Confirmed  High cholesterol / SNOMED CT 781021673 / Confirmed  Seborrheic dermatitis / SNOMED CT 86194252 / Confirmed  Lumbar stenosis / SNOMED CT 41099671 / Confirmed  Urinary urgency / SNOMED CT 065586087 / Confirmed  Resolved: At risk for falls / SNOMED CT 241736582  Problem automatically updated based on documentation on History of Falls, History of Falls in last 3 months Sandy, Sandy Fall Risk Score and/or ADLs.  Resolved: At risk for infection / SNOMED CT 142958004  Problem automatically updated based on documentation on WBC and/or Neutro Auto.  Resolved: At risk of pressure sore / SNOMED CT 405568843  Resolved: Chronic diarrhea / SNOMED CT  765231531  Resolved: Impaired mobility / SNOMED CT 482814895  Problem automatically updated based on documentation on Assistive Device, ADLs, Activity Status ADLs, Musculoskeletal Range of Motion, Musculoskeletal Activity, Special Orthopedic Devices, and/or Traction Type.  Resolved: Fecal incontinence / SNOMED CT 032715139  Resolved: IRREGULAR HEART BEAT / SNOMED CT 558982658  Resolved: kidney stone / SNOMED CT 106153547  Resolved: Osteoporosis / SNOMED CT 640302713  Resolved: inocular degeneration / SNOMED CT 441420649,    Active Problems (19)  Anemia   Body tinea   CAD (coronary artery disease)   Constipation   Dysuria   High cholesterol   HTN (hypertension)   hypothyroidism   Insomnia   Leukopenia   Lumbar stenosis   Mobility impaired   Neutropenia   Osteopenia   Osteoporosis screening declined   Postmenopause   Seborrheic dermatitis   Urinary urgency   Wellness examination         Physical Examination   Vital Signs   1/4/2022 11:04 CST       Temperature Oral          37 DegC                             Temperature Oral (calculated)             98.60 DegF                             Peripheral Pulse Rate     58 bpm  LOW                             Respiratory Rate          18 br/min                             SpO2                      99 %                             Oxygen Therapy            Room air                             Systolic Blood Pressure   133 mmHg                             Diastolic Blood Pressure  53 mmHg  LOW                             Blood Pressure Location   Right arm                             Manual Cuff BP            No                             O2 SAT at rest            99 %     Measurements from flowsheet : Measurements   1/4/2022 11:04 CST       Weight Dosing             63.200 kg                             Weight Measured           63.2 kg                             Weight Measured and Calculated in Lbs     139.33 lb                             Height/Length Dosing      156.00  cm                             Height/Length Measured    156 cm                             BSA Measured              1.65 m2                             Body Mass Index Measured  25.97 kg/m2               General:  Alert and oriented, No acute distress.    Eye:  Pupils are equal, round and reactive to light, Extraocular movements are intact, Normal conjunctiva.    HENT:  Normocephalic, Oral mucosa is moist.    Neck:  Supple, Non-tender, No lymphadenopathy.    Respiratory:  Lungs are clear to auscultation, Respirations are non-labored, Breath sounds are equal.    Cardiovascular:  Normal rate, Regular rhythm, No edema.    Gastrointestinal:  Soft, Non-tender, Non-distended, Normal bowel sounds.    Integumentary:  Warm, Dry, Pink, Intact.    Neurologic:  Alert, Oriented, Normal sensory, No focal deficits.    Psychiatric:  Cooperative, Appropriate mood & affect.    Lymphatics:  No lymphadenopathy neck, axilla, groin.    Musculoskeletal:  ambulating with a walker .    Cognition and Speech:  Oriented, Speech clear and coherent.       Review / Management   Results review:  Last 10 Days Lab Results : Lab View   1/4/2022 10:20 CST       WBC                       2.7 x10(3)/mcL  LOW                             RBC                       2.35 x10(6)/mcL  LOW                             Hgb                       8.4 gm/dL  LOW                             Hct                       25.9 %  LOW                             Platelet                  261 x10(3)/mcL                             MCV                       110.2 fL  HI                             MCH                       35.7 pg  HI                             MCHC                      32.4 gm/dL  LOW                             RDW                       21.0 %  HI                             MPV                       12.8 fL  HI                             Abs Neut                  0.88 x10(3)/mcL  LOW                             NEUT%                     33.1 %  NA                              NEUT#                     0.9 x10(3)/mcL  LOW                             LYMPH%                    46.2 %  NA                             LYMPH#                    1.2 x10(3)/mcL                             MONO%                     8.3 %  NA                             MONO#                     0.2 x10(3)/mcL                             EOS%                      7.5 %  NA                             EOS#                      0.2 x10(3)/mcL                             BASO%                     3.8 %  NA                             BASO#                     0.1 x10(3)/mcL  .       Impression and Plan   Diagnoses:  1.  Leukopenia  2.  Neutropenia  3.  Anemia  4.  Macrocytosis    Plan:  At this point, the patient has had a chronic macrocytic anemia and a chronic leukopenia, that has slightly worsened over the last year.  She has required 1 blood transfusion of 2 units pRBCs, done on 8/9/2021.    Once again discussed bone marrow biopsy- given her slight decline in counts. Patient and family opted to continue with close observation at this time.     Once again discussed  performing a bone marrow biopsy for a reason such as if she had a 5q deletion alone she could be treated with Revlimid, or if she had something like ring sideroblasts present on her bone marrow, she would be a candidate for something such as luspatercept.  Patient and family voiced understanding, but do not want to proceed with bone marrow biopsy at this time due to her age.     Plan for 1 unit PRBC today to help with symptomatic anemia    RTC in 4 weeks for repeat CBC     Return to clinic in 8 weeks with repeat labs and for OV and clinical examination    Same day labs: CBC, CMP, Iron studies    All questions were answered to the best of my ability.   Risa LÓPEZ, FNP-C

## 2022-04-30 NOTE — OP NOTE
DATE OF SURGERY:    11/28/2017    SURGEON:  Rafael Gao MD    INDICATION:  Abnormal stress test.    POSTOPERATIVE DIAGNOSIS:  Obstructive coronary disease.    PROCEDURES PERFORMED:    1. Left heart catheterization with selective left and right coronary angiography.  2. Left ventricular hemodynamics.  3. Right iliofemoral angiography.  4. Percutaneous coronary intervention of mid left anterior descending with a 3.0 x 16 Synergy drug-eluting stent.  5. Percutaneous coronary intervention of mid to distal right coronary artery with a 2.5 x 38 Alpine drug-eluting stent.  6. Perclose closure of the right common femoral arteriotomy.    ANGIOGRAPHIC FINDINGS:    1. Left main:  Luminal irregularities.  2. Left circumflex:  Codominant vessel, 20% proximal lesion, 60% OM-1, 90% small branching vessel, 80% distal OM-2.  3. LAD:  30% proximal, 60% ostial diagonal 1, 99% mid LAD reduced to 0% post PCI with a 3.0 x 16 Synergy LORETTA, patent mid to distal LAD stent, 30% distal stenosis.  4. RCA:  50% proximal ISR, 70% mid to distal lesion reduced to 0% post PCI with a 2.5 x 38 Alpine drug-eluting stent, distal 50% stenosis, right PLB with left-to-right collateral subtotally occluded.  5. Right iliofemoral angiography demonstrated adequate sheath insertion, appeared suitable for closure with a closure device.  6. LV:  EDP 12 mmHg, 20 mm gradient on pullback across the aortic valve, LV-gram deferred.    ESTIMATED BLOOD LOSS:  20 cc.    TISSUE REMOVAL:  Not applicable.    COMPLICATIONS:  None.    INFORMED CONSENT:  Informed consent was obtained for the procedure.  Prior to bringing the patient to the cath lab, risks, benefits, and alternatives explained in detail including, not limited to, stroke, heart attack, death, need for emergent surgery, kidney failure from contrast exposure and bleeding.  Patient voiced understanding, wished to proceed.    HISTORY OF PRESENT ILLNESS:  Please see my H and P for details.    PROCEDURE  IN DETAIL:  The patient was taken to the cardiac cath lab in a fasting state, prepped and draped in the usual sterile fashion.  1% lidocaine was used to anesthetize the cutaneous layers on the right common femoral artery.  Using a micropuncture access kit, 5-Hungarian sheath was inserted using modified Seldinger technique.  Right iliofemoral angiography was then performed.  Next, standard left and right coronary angiography performed with a JL4 and a JR4 diagnostic catheter.  Pigtail catheter was advanced to left ventricular chamber.  Hemodynamics, pullback gradient were performed.     Given obstructive disease in the RCA and LAD and abnormal stress test findings, it was decided to proceed with PCI.  Heparin was utilized for systemic anticoagulation.  6-Hungarian sheath was upsized in the right groin.  6-Hungarian EBU 3.5 guiding catheter was used to selectively engage the left main coronary artery and Thiago blue wire was advanced across the lesion.  It was predilated with a 2.5 balloon and subsequently stented with a 3.0 x 16 Synergy drug-eluting stent.  The stent was post dilated distally with a 2.75 noncompliant balloon and high pressures.  Final angiography demonstrated TING-3 flow, no dissection, 0% residual stenosis in the stented portion of vessel.  Attention was then turned to the right coronary artery.  Left guide was removed, and a #6 Hungarian JR4 guiding catheter with side holes utilized to cannulate the right coronary artery.  Thiago blue wire was advanced across the lesion in the mid RCA.  The lesion was predilated with a 2.5 balloon.  Attempts were made at delivering a 2.5 x 38 Alpine drug-eluting stent but were unsuccessful.  The 2.5 balloon was then readvanced and the distal lesion was pre-dilated.  The balloon was then removed and the lesion was stented with a 2.5 x 38 Alpine drug-eluting stent.  Distal portion of the stent was post dilated with a 2.5 balloon.  Final angiography demonstrated TING-3 flow, no  dissection, 0% residual stenosis in the stented portion of the vessel.  The proximal portion of the vessel was deemed to be nonobstructive by angiography.  No further intervention was performed.  The guiding catheter was removed and the ProGlide closure was then successfully deployed in right common femoral arteriotomy.    PLAN:    1. Will continue aspirin and Plavix for minimum of a year.  2.   Will continue aggressive lifestyle modification.  3. Will observe overnight and plan for discharge in the morning indication.        ______________________________  MD LUZ Whitney/DOUGLAS  DD:  11/28/2017  Time:  11:00AM  DT:  11/28/2017  Time:  04:54PM  Job #:  [id number]

## 2022-04-30 NOTE — PROGRESS NOTES
Patient:   Edith Alvarado            MRN: [id number]            FIN: [id number]               Age:   89 years     Sex:  Female     :  1926   Associated Diagnoses:   None   Author:   Mervin JAQUEZ MD, Philippe E        Referring Physician:  Tiffany Cameron MD  PCP:  Same      Visit Information     Problem List:   1.  Leukopenia  2.  Neutropenia  3.  Anemia  4.  Macrocytosis    Treatment Plan:  Observation    Diagnosis/Treatment/HPI:   89-year-old female who presented to see me for leukopenia/neutropenia, and macrocytic anemia.  Her anemia dates back to 2013.  Her macrocytosis started on 8/10/2017, her anemia persisted and she became leukopenic on 2018.  Her B12 on 2019 was normal at 476, on 2019 was 963, and on 2020 was 1012.  She was referred to hematology for further evaluation.  She presented to see me initially on 2020.  At that visit, she had vision changes, decreased hearing, dental problems, swelling in her legs, constipation, dark stools, hemorrhoids, urinary frequency/urgency, and some easy bruising.  She denied any fatigue, weight loss, fever, chills, night sweats, chest pain, shortness of breath, cough, trouble swallowing, abdominal pain, nausea, vomiting, diarrhea, hematuria, dysuria, numbness/tingling.  Work-up on that day revealed normal folic acid, ceruloplasmin, and copper.  Her hemoglobin on that day was 9.6.  At that visit, we discussed bone marrow biopsy versus observation, plan was for observation every 3 months.  Patient did not follow up with me after her initial visit.  She was following with her PCP.  Lab work done on 2021 that revealed a hemoglobin of 7.5, she was admitted to the emergency department, underwent a blood transfusion of 2 units of packed red blood cells, follow-up CBC on 2021 showed increase in hemoglobin up to 10.5.  She represented to see me on 2021,  hemoglobin on that day was 9.6.            No qualifying data  available        Chief Complaint       11/9/2021 11:22 CST      patient stated that she gets short of breath and feels tired all the time    11/9/2021 11:18 CST      patient stated that she gets short of breath and feels tired all the time        Interval History   Patient presents to clinic for a follow up appointment via walker; daughter present.  Overall, she reports fatigue and shortness of breath.  Denies any bright red blood per rectum.  Otherwise, no major issues or complaints.  She still lives alone, is able to perform all of her activities of daily living without limitation or assistance.  She does have some fatigue and shortness of breath.      PMHx:  1.  Hypertension  2.  Coronary artery disease  3.  Thyroid disease  4.  Anemia  5.  Leukopenia     PSHx:   1.  2 stents placed on 5/24/2010  2.  2 stents placed on 8/26/2010    Social Hx:   The patient is , retired, previously worked as an , denies tobacco.  She drinks 3 beers a week.  Fun fact, the patient has 8 children, 17 grandchildren, 37 great-grandchildren, and has 6 great great grandchildren.    Family Hx:   She has a family history of diabetes, heart disease, cancer, hypertension, blood clots, and mental illness.         Review of Systems   14 point review of systems done in full with pertinent positives as described above  Remainder of review systems done in full and unremarkable.      Health Status   Allergies:    Allergic Reactions (Selected)  High  Lortab- Hives.  Severity Not Documented  Codeine- Rash.  Ranexa- Unknown.,    Allergies (3) Active Reaction  Lortab Hives  codeine Rash  Ranexa Unknown     Current medications:  (Selected)   Prescriptions  Prescribed  Coreg 3.125 mg oral tablet: 3.125 mg = 1 tab(s), Oral, BID, # 60 tab(s), 11 Refill(s)  Flonase 50 mcg/inh nasal spray: 1 spray(s), Nasal, Daily, in each nostril, # 16 gm, 11 Refill(s), Pharmacy: [id number], 156, cm, Height/Length Dosing, 01/25/21  10:13:00 CST, 60.2, kg, Weight Dosing, 01/25/21 10:13:00 CST  lactulose 10 g/15 mL oral syrup: 10 gm = 15 mL, Oral, Daily, PRN PRN as needed for constipation, # 480 mL, 5 Refill(s), Pharmacy: [facility], 156, cm, Height/Length Dosing, 06/16/21 11:13:00 CDT, 60.78, kg, Weight Dosing, 06/16/21 11:07:00 CDT  levothyroxine 88 mcg (0.088 mg) oral tablet: See Instructions, TAKE ONE TABLET BY MOUTH EVERY DAY FOR THYROID, # 30 tab(s), 5 Refill(s), Pharmacy: [facility], 156, cm, Height/Length Dosing, 06/16/21 11:13:00 CDT, 60.78, kg, Weight Dosing, 06/16/21 11:07:00 CDT  losartan 100 mg oral tablet: See Instructions, TAKE ONE TABLET BY MOUTH ONCE DAILY FOR BLOOD PRESSURE, # 90 tab(s), 1 Refill(s), [id number], 156, cm, Height/Length Dosing, 01/25/21 10:13:00 CST, 60.2, kg, Weight Dosing, 01/25/21 10:13:00 CST  mupirocin 2% topical ointment: 1 doris, TOP, TID, apply a thin film to toe, # 22 gm, 1 Refill(s), Pharmacy: [facility], 156, cm, Height/Length Dosing, 09/14/21 10:52:00 CDT, 60.1, kg, Weight Dosing, 09/14/21 10:52:00 CDT  rollator walker: rollator walker, See Instructions, please dispense one rollator walker dx:M21.70- need lifetime, # 1 EA, 0 Refill(s)  shoe inserts: shoe inserts, See Instructions, please dispense/fit patient for shoe inserts due to leg length discrepancy per home health PT dx M21.70, # 1 EA, 0 Refill(s)  triamcinolone 0.1% topical cream: 1 doris, TOP, TID, apply a thin film  to affected area, # 30 gm, 5 Refill(s), Pharmacy: [facility] 156, cm, Height/Length Dosing, 08/24/20 13:29:00 CDT, 60.2, kg, Weight Dosing, 08/24/20 13:29:00 CDT  Documented Medications  Documented  Dulcolax Stool Softener 100 mg oral capsule: 200 mg = 2 cap(s), Oral, TID, PRN PRN for constipation, 0 Refill(s)  ISOSORB MONO TAB 60MG ER: 60 mg = 1 tab(s), Oral, Daily  Pravastatin 20 mg Oral Tab: 20 mg = 1 tab(s), Oral, Daily, # 90 tab(s), 0 Refill(s)  PreserVision oral tablet: 1  tab(s), Oral, Daily, # 90 tab(s), 0 Refill(s)  Tylenol 8 HR Arthritis Pain 650 mg oral tablet, extended release: 1,300 mg = 2 tab(s), Oral, q8hr, 0 Refill(s)  Vitamin D3 5000 intl units oral capsule: 5,000 IntUnit = 1 cap(s), Oral, Daily, with food, # 100 cap(s), 0 Refill(s)  aspirin 81 mg oral Delayed Release (EC) tablet: 81 mg = 1 tab(s), Oral, Daily  calcium-vitamin D 600 mg-500 intl units oral tablet, extended release: 1 tab(s), Oral, Once, # 80 tab(s), 0 Refill(s)  magnesium oxide 500 mg oral tablet: 500 mg = 1 tab(s), Oral, Daily, 0 Refill(s)   Problem list:    All Problems  CAD (coronary artery disease) / 414.00 / Confirmed  HTN (hypertension) / 401.9 / Confirmed  High cholesterol / 620268254 / Confirmed  hypothyroidism / 41400851 / Confirmed  Seborrheic dermatitis / 43161492 / Confirmed  Anemia / 104987043 / Confirmed  Lumbar stenosis / 60411571 / Confirmed  Osteopenia / 679704572 / Confirmed  Wellness examination / 944535647 / Confirmed  Body tinea / 718081192 / Confirmed  Mobility impaired / 935432434 / Confirmed  Postmenopause / 014592802 / Confirmed  Insomnia / 669651047 / Confirmed  Constipation / 81473434 / Confirmed  Urinary urgency / 202343098 / Confirmed  Dysuria / 87376389 / Confirmed  Osteoporosis screening declined / 9488910880 / Confirmed  Leukopenia / 152246778 / Confirmed  Neutropenia / 963193196 / Confirmed  Resolved: IRREGULAR HEART BEAT / 464805865  Resolved: kidney stone / 598498422  Resolved: inocular degeneration / 035778965  Resolved: At risk of pressure sore / 578832797  Resolved: At risk for falls / 404933559  Resolved: At risk for infection / 685346439  Resolved: Impaired mobility / 451675929  Resolved: Chronic diarrhea / 766112353  Resolved: Fecal incontinence / 248785348  Resolved: Osteoporosis / 615254088,    Active Problems (19)  Anemia   Body tinea   CAD (coronary artery disease)   Constipation   Dysuria   High cholesterol   HTN (hypertension)   hypothyroidism   Insomnia    Leukopenia   Lumbar stenosis   Mobility impaired   Neutropenia   Osteopenia   Osteoporosis screening declined   Postmenopause   Seborrheic dermatitis   Urinary urgency   Wellness examination         Histories   Past Medical History:    Active  CAD (coronary artery disease) (414.00)  HTN (hypertension) (401.9)  High cholesterol (929976337)  hypothyroidism (92903739)  Resolved  IRREGULAR HEART BEAT (905446589):  Resolved.  kidney stone (940401966):  Resolved.  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  Chronic diarrhea (285196362):  Resolved.  Fecal incontinence (738320889):  Resolved.  Osteoporosis (982906031):  Resolved.   Family History:    History is unknown.   Procedure history:    Bone density scan report (SNOMED CT 9436135053) performed by Jarad Clark MD on 6/1/2018 at 89 Years.  Bone density scan (SNOMED CT 367584995) on 6/1/2018 at 89 Years.  Comments:  6/4/2018 13:12 CDT - Fannie Wooten LPN  BCA  stent placement performed by dr. bustamante in the month of 11/2017 at 89 Years.  right shoulder sx. in 2014 at 88 Years.  Cholecystectomy; (CPT4 27609).  Appendectomy; (CPT4 49918).  partial abdominal hysterectomy (corpus and cervix), with or without removal of tube(s), with or without removal of ovary(s); with colpo-urethrocystopexy (eg, Sizjdybl-Dgtjsoxlo-Utwbeg, Silver) (CPT4 86870).  angiogram.  colonoscopy.  laser eye surgery.  cataracts.  bladder suspension.  Comments:  3/24/2013 11:43 CDT - Carl Maurice RN  x2  Placement of stent in cardiac conduit (SNOMED CT 2508026014).  Comments:  7/17/2015 14:25 CDT - Simi Ley  x2   Social History        Social & Psychosocial Habits    Alcohol  03/24/2013 Risk Assessment: Medium Risk    03/24/2013  Use: Current    Type: Beer    Frequency: Daily    Comment: 1 beer daily - 03/24/2013 11:35 - Carl Maurice RN    Employment/School  03/24/2013 Risk Assessment: No Risk    12/21/2015  Status: Unemployed    Exercise  03/24/2013 Risk Assessment:  Occasional exercise      Comment: DOES NOT EXERCISE - 12/21/2015 14:44 - Fannie Wooten LPN    Home/Environment  03/24/2013 Risk Assessment: Low Risk    03/24/2013  Lives with: Alone    Nutrition/Health  03/24/2013 Risk Assessment: No Risk    12/21/2015  Type of diet: Regular    Sexual  12/21/2015  Sexually active: No    Substance Use  03/24/2013 Risk Assessment: Denies Substance Abuse    12/21/2015  Use: Never    Tobacco  03/24/2013 Risk Assessment: Denies Tobacco Use    11/09/2021  Use: Never (less than 100 in l    Patient Wants Consult For Cessation Counseling No    Abuse/Neglect  11/09/2021  SHX Any signs of abuse or neglect No    Feels unsafe at home: No    Safe place to go: Yes  .        Physical Examination   Vital Signs   11/9/2021 11:22 CST      Temperature Oral          36.5 DegC                             Temperature Oral (calculated)             97.70 DegF                             Peripheral Pulse Rate     65 bpm                             Respiratory Rate          18 br/min                             SpO2                      98 %                             Oxygen Therapy            Room air                             Systolic Blood Pressure   123 mmHg                             Diastolic Blood Pressure  51 mmHg  LOW                             Blood Pressure Location   Left arm                             Manual Cuff BP            No                             O2 SAT at rest            98 %     Measurements from flowsheet : Measurements   11/9/2021 11:22 CST      Weight Dosing             61.600 kg                             Weight Measured           61.6 kg                             Weight Measured and Calculated in Lbs     135.80 lb                             Height/Length Dosing      156.00 cm                             Height/Length Measured    156 cm                             BSA Measured              1.63 m2                             Body Mass Index Measured  25.31 kg/m2         Vital Signs (last 24 hrs)_____  Last Charted___________  Temp Oral     36.5 DegC  (NOV 09 11:22)  Heart Rate Peripheral   65 bpm  (NOV 09 11:22)  Resp Rate         18 br/min  (NOV 09 11:22)  SBP      123 mmHg  (NOV 09 11:22)  DBP      L 51mmHg  (NOV 09 11:22)  SpO2      98 %  (NOV 09 11:22)  Weight      61.6 kg  (NOV 09 11:22)  Height      156 cm  (NOV 09 11:22)  BMI      25.31  (NOV 09 11:22)     General:  Alert and oriented, No acute distress.    Eye:  Extraocular movements are intact, Normal conjunctiva.    HENT:  Normocephalic, Oral mucosa is moist.    Neck:  Supple, No lymphadenopathy.    Respiratory:  Lungs are clear to auscultation, Respirations are non-labored, Breath sounds are equal.    Cardiovascular:  Normal rate, Regular rhythm, No edema.    Gastrointestinal:  Soft, Non-tender, Non-distended, Normal bowel sounds.    Integumentary:  Warm, Dry, Intact.    Neurologic:  Alert, Oriented, Normal sensory, No focal deficits.    Psychiatric:  Cooperative, Appropriate mood & affect.    Lymphatics:  No lymphadenopathy neck, axilla, groin.    Cognition and Speech:  Oriented, Speech clear and coherent.       Review / Management   Results review      Impression and Plan   Diagnoses:  1.  Leukopenia  2.  Neutropenia  3.  Anemia  4.  Macrocytosis    Plan:  At this point, the patient has had a chronic macrocytic anemia and a chronic leukopenia, that has slightly worsened over the last year.  She has required 1 blood transfusion of 2 units pRBCs, done on 8/9/2021.    I explained to her that there is a chance that she would have myelodysplastic syndrome due to her age and blood work findings.  I offered a bone marrow biopsy now vs. follow up with CBC in 8 weeks and if worsening hemoglobin, then ordering bone marrow biopsy then.  Patient and family opted to be seen in 8 weeks.      I explained that the way to diagnose MDS would be a bone marrow biopsy.    Discussed performing a bone marrow biopsy for a reason such as  if she had a 5q deletion alone she could be treated with Revlimid, or if she had something like ring sideroblasts present on her bone marrow, she would be a candidate for something such as luspatercept.  Patient and family voiced understanding, but do not want to proceed with bone marrow biopsy at this time due to her age.     CBC, EPO Level today    Will plan to set up for 2 units of PRBC tomorrow     Return to clinic in 8 weeks with repeat labs and for OV and clinical examination    Same day labs: CBC, CMP, Iron studies    All questions were answered to the best of my ability.   Jeremiah Jeffries II, MD           Professional Services   I, Mara Matamoros LPN, acted solely as a scribe for and in the presence of, Dr. Jeremiah Jeffries who performed the service.

## 2022-04-30 NOTE — CONSULTS
Patient:   Edith Alvarado            MRN: [id number]            FIN: [id number]               Age:   89 years     Sex:  Female     :  1926   Associated Diagnoses:   None   Author:   Mervin JAQUEZ MD, Jeremiah CORLEY        Referring Physician:  Tiffany Cameron MD  PCP:  Same      Visit Information     Problem List:   1.  Leukopenia  2.  Neutropenia  3.  Anemia  4.  Macrocytosis    Treatment Plan:  Observation    Diagnosis/Treatment/HPI:   89-year-old female who presents to see me for leukopenia/neutropenia, and macrocytic anemia.  Her anemia dates back to 2013.  Her macrocytosis started on 8/10/2017, her anemia persisted and she became leukopenic on 2018.  Her B12 on 2019 was normal at 476, on 2019 was 963, and on 2020 was 1012.  She was referred to hematology for further evaluation.  She presents to see me initially on 2020.  At that visit, she had vision changes, decreased hearing, dental problems, swelling in her legs, constipation, dark stools, hemorrhoids, urinary frequency/urgency, and some easy bruising.  She denied any fatigue, weight loss, fever, chills, night sweats, chest pain, shortness of breath, cough, trouble swallowing, abdominal pain, nausea, vomiting, diarrhea, hematuria, dysuria, numbness/tingling.            No qualifying data available        Chief Complaint   I was sent by my primary doctor for abnormal blood work       2020 13:25 CDT      patient stated that she is here today for low iron levels and anemia    2020 13:20 CDT      patient stated that she is here today for low iron levels and anemia        Interval History   Patient here for evaluation for leukopenia, neutropenia, anemia with macrocytosis.    PMHx:  1.  Hypertension  2.  Coronary artery disease  3.  Thyroid disease  4.  Anemia  5.  Leukopenia     PSHx:   1.  2 stents placed on 2010  2.  2 stents placed on 2010    Social Hx:   The patient is , retired, previously  worked as an , denies tobacco.  She drinks 3 beers a week.  Fun fact, the patient has 8 children, 17 grandchildren, 37 great-grandchildren, and is expecting her 6 great great grandchild.    Family Hx:   She has a family history of diabetes, heart disease, cancer, hypertension, blood clots, and mental illness.         Review of Systems   14 point review of systems done in full with pertinent positives as described above  Remainder of review systems done in full and unremarkable.      Health Status   Allergies:    Allergic Reactions (Selected)  High  Lortab- Hives.  Severity Not Documented  Codeine- Rash.  Ranexa- Unknown.,    Allergies (3) Active Reaction  Lortab Hives  codeine Rash  Ranexa Unknown     Current medications:  (Selected)   Prescriptions  Prescribed  Coreg 3.125 mg oral tablet: 3.125 mg = 1 tab(s), Oral, BID, # 60 tab(s), 11 Refill(s)  Flonase 50 mcg/inh nasal spray: 1 spray(s), Nasal, Daily, in each nostril, # 16 gm, 11 Refill(s), Pharmacy: [facility] [place], [place], 156.8, cm, Height/Length Dosing, 01/06/20 10:17:00 CST, 62.5, kg, Weight Dosing, 01/06/20 10:17:00 CST  Zanaflex 4 mg oral tablet: 0.5 tab, Oral, q8hr, PRN PRN muscle pain, # 30 tab(s), 0 Refill(s), Pharmacy: [id number]  levothyroxine 88 mcg (0.088 mg) oral tablet: See Instructions, TAKE ONE TABLET BY MOUTH EVERY DAY FOR THYROID, # 30 tab(s), 5 Refill(s), Pharmacy: [facility], 156.8, cm, Height/Length Dosing, 01/06/20 10:17:00 CST, 62.5, kg, Weight Dosing, 01/06/20 10:17:00 CST  losartan 100 mg oral tablet: See Instructions, TAKE ONE TABLET BY MOUTH ONCE DAILY FOR BLOOD PRESSURE, # 90 tab(s), 1 Refill(s), Pharmacy: [id number], 156.8, cm, Height/Length Dosing, 01/06/20 10:17:00 CST, 62.5, kg, Weight Dosing, 01/06/20 10:17:00 CST  rollator walker: rollator walker, See Instructions, please dispense one rollator walker dx:M21.70- need lifetime, # 1 EA, 0 Refill(s)  shoe inserts:  shoe inserts, See Instructions, please dispense/fit patient for shoe inserts due to leg length discrepancy per home health PT dx M21.70, # 1 EA, 0 Refill(s)  triamcinolone 0.1% topical cream: 1 doris, TOP, TID, apply a thin film  to affected area, # 30 gm, 1 Refill(s), Pharmacy: [facility] 156.8, cm, Height/Length Dosing, 07/09/20 9:46:00 CDT, 61.05, kg, Weight Dosing, 07/09/20 9:46:00 CDT  Documented Medications  Documented  ISOSORB MONO TAB 60MG ER: 60 mg = 1 tab(s), Oral, Daily  Pravastatin 20 mg Oral Tab: 20 mg = 1 tab(s), Oral, Daily, # 90 tab(s), 0 Refill(s)  PreserVision oral tablet: 1 tab(s), Oral, Daily, # 90 tab(s), 0 Refill(s)  Tylenol 8 HR Arthritis Pain 650 mg oral tablet, extended release: 1,300 mg = 2 tab(s), Oral, q8hr, 0 Refill(s)  Vitamin D3 5000 intl units oral capsule: 5,000 IntUnit = 1 cap(s), Oral, Daily, with food, # 100 cap(s), 0 Refill(s)  aspirin 81 mg oral tablet, CHEWABLE: 81 mg, Oral, Daily, 0 Refill(s)  calcium-vitamin D 600 mg-500 intl units oral tablet, extended release: 1 tab(s), Oral, Once, # 80 tab(s), 0 Refill(s)  magnesium oxide 500 mg oral tablet: 500 mg = 1 tab(s), Oral, Daily, 0 Refill(s)   Problem list:    All Problems  Anemia / SNOMED CT 180346301 / Confirmed  CAD (coronary artery disease) / ICD-9-.00 / Confirmed  Chronic diarrhea / SNOMED CT 526065623 / Confirmed  Body tinea / SNOMED CT 454872960 / Confirmed  HTN (hypertension) / ICD-9-.9 / Confirmed  hypothyroidism / SNOMED CT 20997551 / Confirmed  Mobility impaired / SNOMED CT 177566463 / Confirmed  Fecal incontinence / SNOMED CT 931736849 / Confirmed  Osteopenia / SNOMED CT 092081795 / Confirmed  Wellness examination / SNOMED CT 627303403 / Confirmed  Postmenopause / SNOMED CT 820923153 / Confirmed  High cholesterol / SNOMED CT 112189050 / Confirmed  Seborrheic dermatitis / SNOMED CT 77206212 / Confirmed  Lumbar stenosis / SNOMED CT 40851084 / Confirmed,    Active Problems (14)  Anemia   Body tinea   CAD  (coronary artery disease)   Chronic diarrhea   Fecal incontinence   High cholesterol   HTN (hypertension)   hypothyroidism   Lumbar stenosis   Mobility impaired   Osteopenia   Postmenopause   Seborrheic dermatitis   Wellness examination         Physical Examination   Vital Signs   8/11/2020 13:25 CDT      Temperature Oral          36.9 DegC                             Temperature Oral (calculated)             98.42 DegF                             Peripheral Pulse Rate     74 bpm                             Respiratory Rate          20 br/min                             SpO2                      99 %                             Oxygen Therapy            Room air                             Systolic Blood Pressure   110 mmHg                             Diastolic Blood Pressure  60 mmHg                             Blood Pressure Location   Right arm                             Manual Cuff BP            No                             O2 SAT at rest            99 %     Measurements from flowsheet : Measurements   8/11/2020 13:25 CDT      Weight Dosing             61.100 kg                             Weight Measured           61.1 kg                             Weight Measured and Calculated in Lbs     134.70 lb                             Weight Estimated          61.1 kg                             Height/Length Dosing      156.00 cm                             Height/Length Measured    156 cm                             Height/Length Estimated   156 cm                             BSA Measured              1.63 m2                             BSA Estimated             1.63 m2                             Body Mass Index Estimated 25.11 kg/m2                             Body Mass Index Measured  25.11 kg/m2        Vital Signs (last 24 hrs)_____  Last Charted___________  Temp Oral     36.9 DegC  (AUG 11 13:25)  Heart Rate Peripheral   74 bpm  (AUG 11 13:25)  Resp Rate         20 br/min  (AUG 11 13:25)  SBP      110 mmHg   (AUG 11 13:25)  DBP      60 mmHg  (AUG 11 13:25)  SpO2      99 %  (AUG 11 13:25)  Weight      61.1 kg  (AUG 11 13:25)  Height      156 cm  (AUG 11 13:25)  BMI      25.11  (AUG 11 13:25)     General:  Alert and oriented, No acute distress.    Eye:  Extraocular movements are intact, Normal conjunctiva.    HENT:  Normocephalic, Oral mucosa is moist.    Neck:  Supple, No lymphadenopathy.    Respiratory:  Lungs are clear to auscultation, Respirations are non-labored, Breath sounds are equal.    Cardiovascular:  Normal rate, Regular rhythm, No edema.    Gastrointestinal:  Soft, Non-tender, Non-distended, Normal bowel sounds.    Integumentary:  Warm, Dry, Intact.    Neurologic:  Alert, Oriented, Normal sensory, No focal deficits.    Psychiatric:  Cooperative, Appropriate mood & affect.    Lymphatics:  No lymphadenopathy neck, axilla, groin.    Cognition and Speech:  Oriented, Speech clear and coherent.       Review / Management   Results review      Impression and Plan   Diagnoses:  1.  Leukopenia  2.  Neutropenia  3.  Anemia  4.  Macrocytosis    Plan:  At this point, the patient has had a chronic macrocytic anemia and a chronic leukopenia.  It did slightly worsen.  She is completely asymptomatic.  She has not had any fevers, infections, weight loss, night sweats.  She does not have any bleeding.    I explained to her that there is a chance that she would have myelodysplastic syndrome due to her age and blood work findings, however she is not requiring transfusions and therefore I would not treat her at this point in time.  At 89 years old, I feel that treating her if she is not transfusion dependent would just cause more harm than good.  Her counts have been stable for quite some time.    I also explained that the way to diagnose MDS would be a bone marrow biopsy, and I do not feel that doing that at this time is warranted.    I did explain that if she ever has worsening cytopenias and gets to a point that she  requires transfusions, we could do a bone marrow biopsy for a reason such as if she had a 5q deletion alone she could be treated with Revlimid, or if she had something like ring sideroblasts present on her bone marrow, she would be a candidate for something such as luspatercept.  She voiced understanding.    Once again, I gave her reassurance, stated that this is been going on for quite some time, and due to her lack of symptoms, I feel that following her every 3 months with blood work would be appropriate.  She and her daughter agreed.    She will return to clinic in 3 months.    All questions were answered to the best of my ability and the patient and her daughter understand the plan moving forward    Jeremiah Jeffries II, MD

## 2022-04-30 NOTE — H&P
Patient:   Edith Alvarado            MRN: [id number]            FIN: [id number]               Age:   89 years     Sex:  Female     :  1926   Associated Diagnoses:   None   Author:   Shawn Sherwood MD      Basic Information   Source of history:  Self.       Chief Complaint   Chest pain       History of Present Illness   This is a 89 year old female known to [facility] who presented to the ER with c/o chest pain that woke her up from sleep this morning. Chest discomfort was midsternal lasting only seconds. She was chest pain free at time of ER arrival and troponins x1 set are Normal. EKG showed no eveidence of acute T wave findings. She is being admitted for r/o of Myocardial Infarction. She has a known abnormal stress test and is planning for a coronary angiogram however was on hold and being treated medically from a CAD stand point and groin fungal rash is also being treated.       Review of Systems   Constitutional:  Weakness.    Eye:  Negative.    Ear/Nose/Mouth/Throat:  Negative.    Respiratory:  Negative.    Cardiovascular:  Chest pain.    Gastrointestinal:  Negative.    Genitourinary:  Negative.    Musculoskeletal:  Negative.    Integumentary:  Negative.    Neurologic:  Negative.       Health Status   Current medications:    Medications (2) Active  Scheduled: (2)  nystatin topical 510159 u/gm Cre  1 doris, TOP, QID  nystatin topical 770775 u/gm Pow  1 doris, TOP, TID  Continuous: (0)  PRN: (0)        Histories   Past Medical History:    Active  CAD (coronary artery disease) (414.00)  HTN (hypertension) (401.9)  High cholesterol (752724572)  hypothyroidism (72924411)  Resolved  IRREGULAR HEART BEAT (334194282):  Resolved.  kidney stone (781412634):  Resolved.  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   Family History:    History is unknown.   Procedure history:    right shoulder sx. in 2014 at 88 Years.  Cholecystectomy; (55329).  Appendectomy; (91024).  partial abdominal hysterectomy (corpus and  cervix), with or without removal of tube(s), with or without removal of ovary(s); with colpo-urethrocystopexy (eg, Elmer, Adrianne) (09298).  angiogram.  colonoscopy.  laser eye surgery.  cataracts.  bladder suspension.  Comments:  3/24/2013 11:43 - Josafat CLIFTON , Carl WARD  x2  Placement of stent in cardiac conduit ([id number]).  Comments:  7/17/2015 14:25 - Rahat RT, Simi RICH  x2   Social History        Social & Psychosocial Habits    Alcohol  03/24/2013 Risk Assessment: Medium Risk    03/24/2013  Use: Current    Type: Beer    Frequency: Daily    Comment: 1 beer daily - 03/24/2013 11:35 - Josafat CLIFTON , Carl WARD    Employment/School  03/24/2013 Risk Assessment: No Risk    12/21/2015  Status: Unemployed    Exercise  03/24/2013 Risk Assessment: Occasional exercise      Comment: DOES NOT EXERCISE - 12/21/2015 14:44 - Fannie Wooten LPN    Home/Environment  03/24/2013 Risk Assessment: Low Risk    03/24/2013  Lives with: Alone    Nutrition/Health  03/24/2013 Risk Assessment: No Risk    12/21/2015  Type of diet: Regular    Sexual  12/21/2015  Sexually active: No    Substance Abuse  03/24/2013 Risk Assessment: Denies Substance Abuse    12/21/2015  Use: Never    Tobacco  03/24/2013 Risk Assessment: Denies Tobacco Use    07/17/2015  Use: Never smoker  .        Physical Examination   General:  Alert and oriented, No acute distress.    Eye:  Pupils are equal, round and reactive to light.    HENT:  Normocephalic.    Neck:  Supple, No carotid bruit, No jugular venous distention.    Respiratory:  Lungs are clear to auscultation, Breath sounds are equal, No chest wall tenderness.    Cardiovascular:  Normal rate, Regular rhythm, No murmur, No gallop, Normal peripheral perfusion, No edema.         Arterial pulses: Bilateral, Radial, Posterior tibial, Dorsalis pedis, 2+.         Bruit: Bilateral.    Gastrointestinal:  Soft, Non-tender, Non-distended.       Vital Signs (last 24 hrs)_____  Last Charted___________  Temp  Oral     36.5 DegC  (OCT 06 09:43)  Heart Rate Peripheral   70 bpm  (OCT 06 10:57)  Resp Rate         18 br/min  (OCT 06 10:57)  SBP      H 142mmHg  (OCT 06 10:57)  DBP      63 mmHg  (OCT 06 10:57)  SpO2      99 %  (OCT 06 10:57)     Integumentary:  Rash on R groin.    Neurologic:  Alert, Oriented, Cranial Nerves II-XII are grossly intact.    Psychiatric:  Cooperative, Appropriate mood & affect.       Review / Management   Results review:  All Results   10/6/2017 9:25 CDT       WBC                       6.6 x10(3)/mcL                             RBC                       3.64 x10(6)/mcL  LOW                             Hgb                       11.6 gm/dL  LOW                             Hct                       34.5 %  LOW                             Platelet                  192 x10(3)/mcL                             MCV                       94.8 fL  HI                             MCH                       31.9 pg  HI                             MCHC                      33.6 gm/dL                             RDW                       13.4 %                             MPV                       12.1 fL  HI                             Abs Neut                  3.03 x10(3)/mcL                             Neutro Auto               45.7 %                             Lymph Auto                43.4 %                             Mono Auto                 8.1 %                             Eos Auto                  2.3 %                             Abs Eos                   0.15 x10(3)/mcL                             Basophil Auto             0.2 %                             Abs Neutro                3.03 x10(3)/mcL                             Abs Lymph                 2.88 x10(3)/mcL                             Abs Mono                  0.54 x10(3)/mcL                             Abs Baso                  0.01 x10(3)/mcL                             NRBC%                     0.0 %                             IG%                        0.300 %                             IG#                       0.0200  HI                             PT                        10.3 second(s)                             INR                       0.9  NA                             PTT                       21 second(s)                             Sodium Lvl                143 mmol/L                             Potassium Lvl             4.0 mmol/L                             Chloride                  107 mmol/L                             CO2                       28.0 mmol/L                             Calcium Lvl               8.8 mg/dL                             Glucose Lvl               132 mg/dL  HI                             BUN                       26.0 mg/dL  HI                             Creatinine                0.96 mg/dL                             eGFR-AA                   >60 mL/min/1.73 m2  NA                             eGFR-RICH                  58 mL/min/1.73 m2  NA                             Bili Total                0.6 mg/dL                             Bili Direct               0.20 mg/dL                             Bili Indirect             0.40 mg/dL                             AST                       15 unit/L                             ALT                       17 unit/L                             Alk Phos                  89 unit/L                             Total Protein             7.4 gm/dL                             Albumin Lvl               4.1 gm/dL                             Globulin                  3 gm/dL                             A/G Ratio                 1  NA                             Troponin-I                <0.015 ng/mL                             TSH                       1.290 mIU/mL  .    Radiology results   Rad Results ()   Accession: [id number]  Order: XR Chest 1 View  Report Dt/Tm: 10/06/2017 10:38  Report:   Clinical History  Chest pain     Technique  Portable Single view AP radiograph  of the chest.     Comparison  The 20th 2017.     Findings  No focal opacification, pleural effusion, or pneumothorax. The  cardiomediastinal silhouette is within normal limits. No acute osseous  abnormality.     IMPRESSION:  No acute cardiopulmonary process.          Cardiac Monitor:  At  10/6/2017 11:46:00.        Studies:  9/17 cardiac PETabnormal, moderate to high risk apical, septal, inferior defects  9/17 echoEF 55, moderate AV calcification, mild to moderate aortic stenosis, 2+ MR  9/17 venousgrade 4 insufficiency bilateral8/17 carotid139% bilateral ICA disease2/17 labsCR 0.8, K3.9  1/17 labscholesterol 153, LDL 61, CR 0.7, K 3.7, normal LFTsLeft heart cath   8/26/2010PCI to RCA, LAD, Taxus drug-eluting stent      Impression and Plan   Chest pain   CAD hx of stents   Hyperlipidemia     PLAN:  Continue to cycle cardiac enzymes and monitor trend  Fungal infection slowly improving  Uptitrate medical regimen as tolerated   Discussed plan of care and all questions answered

## 2022-04-30 NOTE — DISCHARGE SUMMARY
Patient:   Edith Alvarado            MRN: [id number]            FIN: [id number]               Age:   89 years     Sex:  Female     :  1926   Associated Diagnoses:   None   Author:   Amado CASTELLANOS, Torito Chavez      History of Present Illness   89-year-old female with history of abnormal stress test, venous insufficiency, hypertension, CAD, carotid stenosis, dyslipidemia s/p C with PCI mid LAD, mid to distal RCA using LORETTA, by Dr. Gao on 17 at [facility]. See cath report below. Mrs. Alvarado is feeling well this am. She is sitting up in the chair and denies chest pain, sob, palp, or any discomfort. She would like to go home with her daughter today.     [facility] 17  1. Left main:  Luminal irregularities.  2. Left circumflex:  Codominant vessel, 20% proximal lesion, 60% OM-1, 90% small branching vessel, 80% distal OM-2.  3. LAD:  30% proximal, 60% ostial diagonal 1, 99% mid LAD reduced to 0% post PCI with a 3.0 x 16 Synergy LORETTA, patent mid to distal LAD stent, 30% distal stenosis.  4. RCA:  50% proximal ISR, 70% mid to distal lesion reduced to 0% post PCI with a 2.5 x 38 Alpine drug-eluting stent, distal 50% stenosis, right PLB with left-to-right collateral subtotally occluded.  5. Right iliofemoral angiography demonstrated adequate sheath insertion, appeared suitable for closure with a closure device.  6. LV:  EDP 12 mmHg, 20 mm gradient on pullback across the aortic valve, LV-gram deferred.         Review of Systems   Constitutional:  Negative.    Eye:  Negative.    Ear/Nose/Mouth/Throat:  Negative.    Respiratory:  Negative.    Cardiovascular:  Negative.    Gastrointestinal:  Negative.    Genitourinary:  Negative.    Hematology/Lymphatics:  Negative.    Endocrine:  Negative.    Immunologic:  Negative.    Musculoskeletal:  Negative.    Integumentary:  Negative.    Neurologic:  Negative.    Psychiatric:  Negative.       Health Status   Allergies:    Allergic Reactions (Selected)  Severity  Not Documented  Codeine- Rash.  Ranexa- Unknown.,    Allergies (2) Active Reaction  codeine Rash  Ranexa unknown     Current medications:  (Selected)   Inpatient Medications  Ordered  Ambien 5 mg oral tablet: 5 mg, form: Tab, Oral, Once a day (at bedtime) PRN for sleep, first dose 11/24/17 12:21:00 CST, give for pt's >70 yrs old, 53  Coreg 3.125 mg oral tablet: 3.125 mg, form: Tab, Oral, BID, first dose 11/28/17 21:00:00 CST, 24,034  Norco 7.5 mg-325 mg oral tablet: 1 tab(s), form: Tab, Oral, q4hr PRN for pain, first dose 11/24/17 12:21:00 CST  Plavix: 75 mg, form: Tab, Oral, Daily, first dose 11/29/17 9:00:00 CST, 23  Sodium Chloride 0.9% 1,000 mL: 1,000 mL, 1,000 mL, IV, 125 mL/hr, start date 11/28/17 11:07:00 CST  Zocor 40 mg oral tablet: 40 mg, form: Tab, Oral, Daily, first dose 11/28/17 17:00:00 CST, 66,028  alPRAzolam 0.25 mg oral tab: 0.25 mg, form: Tab, Oral, TID PRN for as needed for anxiety, first dose 11/24/17 12:21:00 CST, 26,022  aspirin: 81 mg, form: Tab-Chew, Oral, Daily, first dose 11/29/17 9:00:00 CST, 23  hydrochlorothiazide 25 mg oral tablet: 25 mg, form: Tab, Oral, Daily, first dose 11/29/17 9:00:00 CST, 23  levothyroxine 88 mcg (0.088 mg) oral tablet: 88 mcg, form: Tab, Oral, Daily, first dose 11/29/17 6:00:00 CST, 24,027  valsartan: 320 mg, form: Tab, Oral, Daily, first dose 11/29/17 9:00:00 CST, 23  Prescriptions  Prescribed  Coreg 3.125 mg oral tablet: 3.125 mg = 1 tab(s), Oral, BID, # 60 tab(s), 11 Refill(s)  levothyroxine 88 mcg (0.088 mg) oral tablet: 88 mcg = 1 tab(s), Oral, Daily, # 90 tab(s), 1 Refill(s), Pharmacy: [facility]/pharmacy #[id number]  nystatin 100,000 units/g topical cream: See Instructions, APPLY TOPICAL 3 TIMES A DAY X14 DAY(S), # 15 gm, eRx: [facility]/pharmacy #[id number]  Documented Medications  Documented  ISOSORB MONO TAB 60MG ER: 60 mg = 1 tab(s), Oral, Daily  Plavix 75 mg oral tablet: 75 mg = 1 tab(s), Oral, Daily, # 30 tab(s), 0 Refill(s)  Pravachol 80 mg oral tablet: 80 mg = 1 tab(s),  Oral, Daily, # 30 tab(s), 0 Refill(s)  PreserVision oral tablet: 1 tab(s), Oral, Daily, # 30 tab(s), 0 Refill(s)  VALSART/HCTZ -25M tab(s), Oral, Daily  Vitamin D3 5000 intl units oral capsule: 5,000 IntUnit = 1 cap(s), Oral, Daily, with food, # 100 cap(s), 0 Refill(s)  aspirin 81 mg oral tablet, CHEWABLE: 81 mg, Oral, Daily, 0 Refill(s)  calcium-vitamin D 600 mg-500 intl units oral tablet, extended release: 1 tab(s), Oral, Once, # 80 tab(s), 0 Refill(s),    Medications (11) Active  Scheduled: (7)  aspirin 81 mg Chew tab  81 mg 1 tab(s), Oral, Daily  carvedilol 3.125 mg Tab UD  3.125 mg 1 tab(s), Oral, BID  clopidogrel 75 mg Tab UD  75 mg 1 tab(s), Oral, Daily  hydrochlorothiazide 25 mg Tab UD  25 mg 1 tab(s), Oral, Daily  levothyroxine 0.088 mg Tab  88 mcg 1 tab(s), Oral, Daily  simvastatin 40 mg Tab UD  40 mg 1 tab(s), Oral, Daily  valsartan 160 mg Tab UD  320 mg 2 tab(s), Oral, Daily  Continuous: (1)  sodium chloride 0.9% 1,000 mL  1,000 mL, IV, 125 mL/hr  PRN: (3)  alprazolam 0.25 mg Tab UD  0.25 mg 1 tab(s), Oral, TID  hydrocodone 7.5mg/APAP 325mg  1 tab(s), Oral, q4hr  zolpidem 5 mg Tab UD  5 mg 1 tab(s), Oral, Once a day (at bedtime)     Problem list:    All Problems  CAD (coronary artery disease) / ICD-9-.00 / Confirmed  Chronic diarrhea / SNOMED CT 303232712 / Confirmed  HTN (hypertension) / ICD-9-.9 / Confirmed  hypothyroidism / SNOMED CT 87209862 / Confirmed  Fecal incontinence / SNOMED CT 121036265 / Confirmed  High cholesterol / SNOMED CT 667588552 / Confirmed  Seborrheic dermatitis / SNOMED CT 19091605 / Confirmed,    Active Problems (7)  CAD (coronary artery disease)   Chronic diarrhea   Fecal incontinence   High cholesterol   HTN (hypertension)   hypothyroidism   Seborrheic dermatitis         Histories   Past Medical History:    Active  CAD (coronary artery disease) (414.00)  HTN (hypertension) (401.9)  High cholesterol (009641251)  hypothyroidism  (47458996)  Resolved  IRREGULAR HEART BEAT (368370245):  Resolved.  kidney stone (019499472):  Resolved.  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   Family History:    History is unknown.   Procedure history:    right shoulder sx. in 2014 at 88 Years.  Cholecystectomy; (86345).  Appendectomy; (67701).  partial abdominal hysterectomy (corpus and cervix), with or without removal of tube(s), with or without removal of ovary(s); with colpo-urethrocystopexy (eg, Wttxnvpk-Qnprtguia-Zkhhrb, Silver) (81909).  angiogram.  colonoscopy.  laser eye surgery.  cataracts.  bladder suspension.  Comments:  3/24/2013 11:43 - Josafat CLIFTON , Carl WARD  x2  Placement of stent in cardiac conduit ([id number]).  Comments:  7/17/2015 14:25 - Simi eLy  x2   Social History        Social & Psychosocial Habits    Alcohol  03/24/2013 Risk Assessment: Medium Risk    03/24/2013  Use: Current    Type: Beer    Frequency: Daily    Comment: 1 beer daily - 03/24/2013 11:35 - Josafat CLIFTON , Carl WARD    Employment/School  03/24/2013 Risk Assessment: No Risk    12/21/2015  Status: Unemployed    Exercise  03/24/2013 Risk Assessment: Occasional exercise      Comment: DOES NOT EXERCISE - 12/21/2015 14:44 - Fannie Wooten LPN    Home/Environment  03/24/2013 Risk Assessment: Low Risk    03/24/2013  Lives with: Alone    Nutrition/Health  03/24/2013 Risk Assessment: No Risk    12/21/2015  Type of diet: Regular    Sexual  12/21/2015  Sexually active: No    Substance Abuse  03/24/2013 Risk Assessment: Denies Substance Abuse    12/21/2015  Use: Never    Tobacco  03/24/2013 Risk Assessment: Denies Tobacco Use    07/17/2015  Use: Never smoker  .        Physical Examination   General:  Alert and oriented, No acute distress.    Eye:  Extraocular movements are intact, Normal conjunctiva.    HENT:  Normocephalic, Normal hearing.    Neck:  Supple, Non-tender, No jugular venous distention.    Respiratory:  Lungs are clear to auscultation, Respirations are  non-labored, Breath sounds are equal, Symmetrical chest wall expansion, No chest wall tenderness.    Cardiovascular:  Normal rate, Regular rhythm, Good pulses equal in all extremities, Normal peripheral perfusion, No edema, systolic murmur 3/6 , right groin is soft, flat, nontender .    Gastrointestinal:  Soft, Non-tender, Non-distended, Normal bowel sounds.       Vital Signs (last 24 hrs)_____  Last Charted___________  Temp Oral     36.6 DegC  (NOV 29 07:25)  Heart Rate Peripheral   70 bpm  (NOV 29 07:25)  Resp Rate         18 br/min  (NOV 29 07:00)  SBP      128 mmHg  (NOV 29 07:25)  DBP      68 mmHg  (NOV 29 07:25)  SpO2      99 %  (NOV 29 07:25)     Musculoskeletal:  Normal range of motion, Normal strength.    Integumentary:  Warm, Dry, Pink, Intact.    Neurologic:  Alert, Oriented, Normal sensory, Normal motor function, No focal deficits.    Psychiatric:  Cooperative, Appropriate mood & affect.       Health Maintenance      Health Maintenance     Pending (in the next year)        OverDue           Pneumococcal Vaccine due  03/04/16  and every 100  year(s)        Due            Bone Density Screening due  11/29/17  Variable frequency           Zoster Vaccine due  11/29/17  and every 100  year(s)        Due In Future            Lipid Screening not due until  08/10/18  and every 1  year(s)           Influenza Vaccine not due until  10/02/18  and every 1  year(s)           Body Mass Index Check not due until  10/24/18  and every 1  year(s)           Depression Screening not due until  10/24/18  and every 1  year(s)           Obesity Screening not due until  10/24/18  and every 1  year(s)     Satisfied (in the past 1 year)        Satisfied            Blood Pressure Screening on  11/29/17.  Satisfied by Anjali Chopra C.N.A.           Body Mass Index Check on  10/24/17.  Satisfied by Fannie Wooten LPN           Depression Screening on  10/24/17.  Satisfied by Fannie Wooten LPN           Diabetes Screening on  11/29/17.   Satisfied by Gideon Houston           Influenza Vaccine on  10/07/17.  Satisfied by Beena CLIFTON, Nasreen PATIÑO           Lipid Screening on  08/10/17.  Satisfied by Kim TIAN, Danyell TOTH           Obesity Screening on  10/24/17.  Satisfied by Fannie Wooten LPN          Review / Management   Results review:     Labs (Last four charted values)  Glucose              85 (NOV 29) .       Impression and Plan   Obstructive CAD s/p PCI mid LAD, mid to distal RCA with LORETTA on 11.28.17  Venous insufficiency  Hypertension  carotid stenosis  dyslipidemia    Plan:  Continue ASA and plavix   Continue home meds   Follow up with Pradeep Aranda NP on 12/6/17  Groin site care advised

## 2022-05-14 NOTE — ED PROVIDER NOTES
Patient:   Edith Alvarado            MRN: [id number]            FIN: [id number]               Age:   89 years     Sex:  Female     :  1926   Associated Diagnoses:   Symptomatic anemia; Advanced age   Author:   Consuelo Navas MD      Basic Information   Additional information: Chief Complaint from Nursing Triage Note : Chief Complaint   4/15/2022 12:57 CDT      Chief Complaint           pt complains of fatigue and SOB. per daughter pt is scheduled to have blood transfusion on Monday with hematologist. per daughter SOB and fatigue have worsened    2022 13:45 CDT      Chief Complaint           patient stated that she has been feeling very tired/dizzy and really short of breath.    2022 13:43 CDT      Chief Complaint           patient stated that she has been feeling very tired/dizzy and really short of breath.  (Modified) .      History of Present Illness   The patient presents with weakness and 89-year-old female with a history of HTN, CAD, neutropenia and anemia of uncertain etiology, presents to the emergency room complaining of generalized weakness, shortness of breath with activity present for the last few days.  Her hematologist checked her blood count and her hemoglobin was 7 and recommended she have a blood transfusion on Monday.  However, she is feeling worse, more short of breath with any activity, and weak.  She denies pain of any type.  No chest pain, abdominal pain, extremity pain.  With regards to the anemia, it suspected that she does have a blood dyscrasia but due to her age, she did not want the bone marrow biopsy or any specific treatment..  The onset was 3  days ago.  The course/duration of symptoms is constant and worsening.  The character of symptoms is generalized.  The degree at present is severe.  Prior episodes: frequent.  Therapy today: see nurses notes.  Associated symptoms: shortness of breath, denies chest pain, denies abdominal pain, denies nausea and denies  vomiting.        Review of Systems   Constitutional symptoms:  Weakness.   Respiratory symptoms:  Shortness of breath.             Additional review of systems information: All other systems reviewed and otherwise negative.      Health Status   Allergies:    Allergic Reactions (Selected)  High  Lortab- Hives.  Severity Not Documented  Codeine- Rash.  Ranexa- Unknown.,    Allergies (3) Active Reaction  Lortab Hives  codeine Rash  Ranexa Unknown  .      Past Medical/ Family/ Social History   Medical history:    Active  CAD (coronary artery disease) (414.00)  HTN (hypertension) (401.9)  High cholesterol (586358922)  hypothyroidism (34409186)  Resolved  IRREGULAR HEART BEAT (763962278):  Resolved.  kidney stone (116897293):  Resolved.  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  Chronic diarrhea (667523796):  Resolved.  Fecal incontinence (738924808):  Resolved.  Osteoporosis (075618434):  Resolved., Reviewed as documented in chart.   Surgical history:    Blood transfusion ([id number]) on 2/28/2022 at 89 Years.  Bone density scan report ([id number]) on 6/1/2018 at 89 Years.  Bone density scan (127372705) on 6/1/2018 at 89 Years.  Comments:  6/4/2018 13:12 CDT - Fannie Wooten LPN  BCA  stent placement in the month of 11/2017 at 89 Years.  right shoulder sx. in 2014 at 88 Years.  Cholecystectomy; (51565).  Appendectomy; (26337).  partial abdominal hysterectomy (corpus and cervix), with or without removal of tube(s), with or without removal of ovary(s); with colpo-urethrocystopexy (eg, Qwtnifjz-Nqrgmrqkv-Fnrdqx, Silver) (39650).  angiogram.  colonoscopy.  laser eye surgery.  cataracts.  bladder suspension.  Comments:  3/24/2013 11:43 CDT - Josafat CLIFTON , Carl WARD  x2  Placement of stent in cardiac conduit ([id number]).  Comments:  7/17/2015 14:25 CDT - Simi Ley  x2, Reviewed as documented in chart.   Family history:    History is unknown..   Social history: Tobacco use: Denies.   Problem list:    Active Problems  (19)  Anemia   Body tinea   CAD (coronary artery disease)   Constipation   Dysuria   High cholesterol   HTN (hypertension)   hypothyroidism   Insomnia   Leukopenia   Lumbar stenosis   Mobility impaired   Neutropenia   Osteopenia   Osteoporosis screening declined   Postmenopause   Seborrheic dermatitis   Urinary urgency   Wellness examination   , per nurse's notes.      Physical Examination               Vital Signs   Vital Signs   4/15/2022 13:30 CDT      Peripheral Pulse Rate     65 bpm                             Heart Rate Monitored      65 bpm                             Respiratory Rate          22 br/min                             SpO2                      96 %                             Oxygen Therapy            Room air                             Systolic Blood Pressure   123 mmHg                             Diastolic Blood Pressure  52 mmHg  LOW                             Mean Arterial Pressure, Cuff              76 mmHg    4/15/2022 12:57 CDT      Temperature Temporal Artery               36.3 DegC                             Peripheral Pulse Rate     60 bpm                             Respiratory Rate          18 br/min                             SpO2                      99 %                             Oxygen Therapy            Room air                             Systolic Blood Pressure   124 mmHg                             Diastolic Blood Pressure  56 mmHg  LOW    4/14/2022 13:45 CDT      Temperature Oral          36.7 DegC                             Temperature Oral (calculated)             98.06 DegF                             Peripheral Pulse Rate     67 bpm                             Respiratory Rate          18 br/min                             SpO2                      99 %                             Oxygen Therapy            Room air                             Systolic Blood Pressure   134 mmHg                             Diastolic Blood Pressure  60 mmHg                              Blood Pressure Location   Left arm                             Manual Cuff BP            No                             O2 SAT at rest            99 %  .      Vital Signs (last 24 hrs)_____  Last Charted___________  Heart Rate Peripheral   65 bpm  (APR 15 13:30)  Resp Rate         22 br/min  (APR 15 13:30)  SBP      123 mmHg  (APR 15 13:30)  DBP      L 52mmHg  (APR 15 13:30)  SpO2      96 %  (APR 15 13:30)  .   Measurements   4/15/2022 12:57 CDT      Weight Dosing             61.5 kg                             Weight Measured and Calculated in Lbs     135.58 lb                             Weight Estimated          61.5 kg    4/14/2022 13:45 CDT      Weight Dosing             62.000 kg                             Weight Measured           62 kg                             Weight Measured and Calculated in Lbs     136.69 lb                             Height/Length Dosing      158.00 cm                             Height/Length Measured    158 cm                             BSA Measured              1.65 m2                             Body Mass Index Measured  24.84 kg/m2  .   Basic Oxygen Information   4/15/2022 13:30 CDT      SpO2                      96 %                             Oxygen Therapy            Room air    4/15/2022 12:57 CDT      SpO2                      99 %                             Oxygen Therapy            Room air    4/14/2022 13:45 CDT      SpO2                      99 %                             Oxygen Therapy            Room air  .   General:  Alert, no acute distress.    Skin:  Warm, dry.    Eye:  Pupils are equal, round and reactive to light.   Cardiovascular:  Regular rate and rhythm.   Respiratory:  Lungs are clear to auscultation.   Gastrointestinal:  Soft, Nontender, Non distended, Normal bowel sounds, No organomegaly, Mass: Negative.    Musculoskeletal:  Ambulatory without assistance.   Neurological:  Alert and oriented to person, place, time, and situation, No focal neurological  deficit observed.    Psychiatric:  Cooperative, appropriate mood & affect.       Medical Decision Making   Differential Diagnosis:  Symptomatic anemia, CHF, CAD, advanced age, deconditioning, electrolyte abnormality.   Documents reviewed:  Emergency department nurses' notes.   Electrocardiogram:  Time 4/15/2022 14:04:00, rate 67, normal sinus rhythm, no ectopy, normal TX & QRS intervals, Left bundle branch block with expected nonspecific ST and T wave abnormality, and anterior Q waves, Unchanged from EKG dated February 18, 2022.    Results review:  Lab results : Lab [facility]   4/15/2022 13:30 CDT      Additional Findings                                    Sodium Lvl                144 mmol/L                             Potassium Lvl             4.2 mmol/L                             Chloride                  110 mmol/L                             CO2                       25 mmol/L                             Calcium Lvl               9.3 mg/dL                             Glucose Lvl               101 mg/dL                             BUN                       24.3 mg/dL  HI                             Creatinine                0.77 mg/dL                             eGFR-AA                   >60  NA                             eGFR-RICH                  >60 mL/min/1.73 m2  NA                             Bili Total                0.4 mg/dL                             Bili Direct               0.1 mg/dL                             Bili Indirect             0.30 mg/dL                             AST                       15 unit/L                             ALT                       14 unit/L                             Alk Phos                  78 unit/L                             Total Protein             6.7 gm/dL                             Albumin Lvl               4.0 gm/dL                             Globulin                  2.7 gm/dL                             A/G Ratio                 1.5 ratio                              Troponin-I                <0.01 ng/mL                             WBC                       3.4 x10(3)/mcL  LOW                             RBC                       2.01 x10(6)/mcL  LOW                             Hgb                       7.7 gm/dL  LOW                             Hct                       24.0 %  LOW                             Platelet                  251 x10(3)/mcL                             MCV                       119.4 fL  HI                             MCH                       38.3 pg  HI                             MCHC                      32.1 gm/dL  LOW                             RDW                       18.2 %  HI                             MPV                       13.5 fL  HI                             Abs Neut                  1.20 x10(3)/mcL  LOW                             Neutro Auto               35.5 %  LOW                             Lymph Auto                47.3 %  HI                             Mono Auto                 8.3 %                             Eos Auto                  4.4 %                             Abs Eos                   0.15 x10(3)/mcL                             Basophil Auto             1.8 %  HI                             Abs Neutro                1.20 x10(3)/mcL  LOW                             Abs Lymph                 1.60 x10(3)/mcL                             Abs Mono                  0.28 x10(3)/mcL                             Abs Baso                  0.06 x10(3)/mcL                             NRBC%                     0.0 %                             IG%                       2.700 %  HI                             IG#                       0.0900  HI                             Hypochrom                 Slight                             Platelet Est              Adequate                             Anisocyte                 2+                             Macrocyte                 3+                             RBC Morph                  Abnormal                             ABO/Rh                    A POS                             Antibody Screen           Negative                             Crossmatch                Compatible                             Crossmatch                Compatible                             Product Ready             2 units rbc    4/14/2022 15:22 CDT      Est Creat Clearance Ser   30.19 mL/min    4/14/2022 13:36 CDT      Sodium Lvl                141 mmol/L                             Potassium Lvl             4.1 mmol/L                             Chloride                  108 mmol/L                             CO2                       27 mmol/L                             Calcium Lvl               8.9 mg/dL                             Glucose Lvl               121 mg/dL                             BUN                       24.2 mg/dL  HI                             Creatinine                0.87 mg/dL                             eGFR-AA                   >60  NA                             eGFR-RICH                  >60 mL/min/1.73 m2  NA                             Bili Total                0.3 mg/dL                             Bili Direct               0.1 mg/dL                             Bili Indirect             0.20 mg/dL                             AST                       12 unit/L                             ALT                       12 unit/L                             Alk Phos                  78 unit/L                             Total Protein             6.3 gm/dL                             Albumin Lvl               3.8 gm/dL                             Globulin                  2.5 gm/dL                             A/G Ratio                 1.5 ratio                             WBC                       2.9 x10(3)/mcL  LOW                             RBC                       1.85 x10(6)/mcL  LOW                             Hgb                       7.1 gm/dL  LOW                              Hct                       22.1 %  LOW                             Platelet                  238 x10(3)/mcL                             MCV                       119.5 fL  HI                             MCH                       38.4 pg  HI                             MCHC                      32.1 gm/dL  LOW                             RDW                       18.5 %  HI                             MPV                       12.7 fL  HI                             Abs Neut                  0.99 x10(3)/mcL  LOW                             NEUT%                     34.6 %  NA                             NEUT#                     1.0 x10(3)/mcL  LOW                             LYMPH%                    46.3 %  NA                             LYMPH#                    1.3 x10(3)/mcL                             MONO%                     7.3 %  NA                             MONO#                     0.2 x10(3)/mcL                             EOS%                      6.6 %  NA                             EOS#                      0.2 x10(3)/mcL                             BASO%                     3.1 %  NA                             BASO#                     0.1 x10(3)/mcL  .      Reexamination/ Reevaluation   Time: 4/15/2022 14:42:00 .   Vital signs   Basic Oxygen Information   4/15/2022 13:30 CDT      SpO2                      96 %                             Oxygen Therapy            Room air    4/15/2022 12:57 CDT      SpO2                      99 %                             Oxygen Therapy            Room air    4/14/2022 13:45 CDT      SpO2                      99 %                             Oxygen Therapy            Room air     Notes: Patient and family member are agreeable to placing the patient in observation to get a blood transfusion.  She will be further evaluated by Dr. Fierro and has been asymptomatic since arrival..      Impression and Plan   Diagnosis   Symptomatic anemia (RYW40-VM D64.9)    Advanced age (IGS39-EG R54)      Calls-Consults   -  4/15/2022 14:42:00 , Vadim TIAN, Francisco BALLESTEROS, Patient can be placed in observation for blood transfusion.    Plan   Condition: Stable.    Disposition: Admit time  4/15/2022 14:42:00, Place in Observation Unit.    Counseled: Patient, Family, Regarding diagnosis, Regarding diagnostic results, Regarding treatment plan, Patient indicated understanding of instructions.

## 2022-05-14 NOTE — DISCHARGE SUMMARY
Patient:   Edith Alvarado            MRN: [id number]            FIN: [id number]               Age:   89 years     Sex:  Female     :  1926   Associated Diagnoses:   Advanced age; Symptomatic anemia; Weakness or fatigue   Author:   Francisco Fierro MD      Results Review   General results      Discharge Information      Discharge Summary Information   Admitted  4/15/2022   Discharged  2022   Admitting physician     Francisco Fierro MD.     Discharge diagnosis     Advanced age (CBZ70-NE R54).     Symptomatic anemia (FBT78-RK D64.9).     Weakness or fatigue (PNED [id number]).        Physical Examination   General:  Alert and oriented, No acute distress.    Neck:  Supple, Non-tender, No jugular venous distention.    Respiratory:  Lungs are clear to auscultation.    Cardiovascular:  Regular rhythm, No murmur.    Gastrointestinal:  Soft, Non-tender, Non-distended, Normal bowel sounds.       Vital Signs (last 24 hrs)_____  Last Charted___________  Temp Oral     36.6 DegC  ( 03:47)  Heart Rate Peripheral   70 bpm  ( 03:47)  Resp Rate         16 br/min  ( 03:00)  SBP      H 145mmHg  (:47)  DBP      61 mmHg  ( 03:47)  SpO2      95 %  (:47)  Weight      61.5 kg  (APR 15 17:28)  Height      158 cm  (APR 15 17:28)  BMI      24.64  (APR 15 17:28)     Musculoskeletal:  Normal range of motion, Normal strength, No tenderness.    Integumentary:  Warm, Dry, Intact.    Neurologic:  No focal deficits, Cranial Nerves II-XII are grossly intact.    Psychiatric:  Appropriate mood & affect.       Hospital Course   89-year-old female with a history of chronic anemia followed by Dr. Curran, patient undergoes regular blood transfusions for recurrent anemia and was supposed to receive a blood transfusion on Monday but woke up this morning symptomatic feeling tired and short of breath.  She presented to the ER and was found to be anemic H&H 7.7 and 24.   Patient is being admitted and transfused 2 units PRBC.  Patient completed transfusion during the night, H&H acceptable range with good response to transfusion.  Patient is feeling well asymptomatic, daughter at bedside and is ready for discharge patient to home.  UA was done in the ER positive for gram-negative rods, patient asymptomatic no treatment necessary         Discharge Plan   Symptomatic anemia  Hypertension  Advanced age    Plan    Discharge to home stable and improved condition activity as tolerated follow-up with PCP Dr. Cameron me oncology Dr. Jeffries  Refer to med list  Time spent on discharge 25 minutes

## 2022-05-14 NOTE — H&P
Patient:   Edith Alvarado            MRN: [id number]            FIN: [id number]               Age:   89 years     Sex:  Female     :  1926   Associated Diagnoses:   None   Author:   Francisco Fierro MD      Basic Information   Source of history:  Self, Family member.       Chief Complaint      History of Present Illness   89-year-old female with a history of chronic anemia followed by Dr. Curran, patient undergoes regular blood transfusions for recurrent anemia and was supposed to receive a blood transfusion on Monday but woke up this morning symptomatic feeling tired and short of breath.  She presented to the ER and was found to be anemic H&H 7.7 and 24.  Patient is being admitted and transfused 2 units PRBC      Review of Systems   Constitutional:  Fatigue, No fever, No chills.    Eye:  No icterus, No blurring.    Ear/Nose/Mouth/Throat:  No decreased hearing, No nasal congestion.    Respiratory:  Shortness of breath, No cough.    Cardiovascular:  No chest pain, No palpitations.    Gastrointestinal:  No nausea, No vomiting.    Genitourinary:  No dysuria, No hematuria.    Hematology/Lymphatics:  No bruising tendency, No bleeding tendency.    Endocrine:  No excessive thirst, No polyuria.    Immunologic:  Not immunocompromised, No recurrent fevers.    Musculoskeletal:  No back pain, No neck pain.    Integumentary:  No rash, No pruritus.    Neurologic:  Alert and oriented X4, No abnormal balance.    Psychiatric:  No anxiety, No depression.       Health Status   Allergies:    Allergies (3) Active Reaction  Lortab Hives  codeine Rash  Ranexa Unknown     Current medications:  (Selected)   Inpatient Medications  Future  Benadryl (for IVPB): 12.5 mg, form: Infusion, IV Piggyback, Once-chemo, Infuse over: 20 minute(s), *Est. first dose 22 8:00:00 CST, *Est. stop date 22 8:00:00 CST, Future Order, Pre-Med for Transfusion, Days 1  Prescriptions  Prescribed  Coreg 3.125 mg oral tablet: 3.125 mg = 1  tab(s), Oral, BID, # 60 tab(s), 11 Refill(s)  lactulose 10 g/15 mL oral syrup: 10 gm = 15 mL, Oral, Daily, PRN PRN as needed for constipation, # 480 mL, 5 Refill(s), Pharmacy: [facility], 156, cm, Height/Length Dosing, 06/16/21 11:13:00 CDT, 60.78, kg, Weight Dosing, 06/16/21 11:07:00 CDT  levothyroxine 88 mcg (0.088 mg) oral tablet: See Instructions, TAKE ONE TABLET BY MOUTH EVERY DAY FOR THYROID, # 30 tab(s), 3 Refill(s), Pharmacy: [facility], 156, cm, Height/Length Dosing, 01/04/22 16:03:00 CST, 63.2, kg, Weight Dosing, 01/04/22 16:03:00 CST  losartan 100 mg oral tablet: See Instructions, TAKE ONE TABLET BY MOUTH ONCE DAILY FOR BLOOD PRESSURE, # 90 tab(s), 1 Refill(s), Pharmacy: [facility][id number], 156, cm, Height/Length Dosing, 01/25/21 10:13:00 CST, 60.2, kg, Weight Dosing, 01/25/21 10:13:00 CST  rollator walker: rollator walker, See Instructions, please dispense one rollator walker dx:M21.70- need lifetime, # 1 EA, 0 Refill(s)  shoe inserts: shoe inserts, See Instructions, please dispense/fit patient for shoe inserts due to leg length discrepancy per home health PT dx M21.70, # 1 EA, 0 Refill(s)  Documented Medications  Documented  Dulcolax Stool Softener 100 mg oral capsule: 200 mg = 2 cap(s), Oral, TID, PRN PRN for constipation, 0 Refill(s)  ISOSORB MONO TAB 60MG ER: 60 mg = 1 tab(s), Oral, Daily  Pravastatin 20 mg Oral Tab: 20 mg = 1 tab(s), Oral, Daily, # 90 tab(s), 0 Refill(s)  PreserVision oral tablet: 1 tab(s), Oral, Daily, # 90 tab(s), 0 Refill(s)  Tylenol 8 HR Arthritis Pain 650 mg oral tablet, extended release: 1,300 mg = 2 tab(s), Oral, q8hr, 0 Refill(s)  Vitamin D3 5000 intl units oral capsule: 5,000 IntUnit = 1 cap(s), Oral, Daily, with food, # 100 cap(s), 0 Refill(s)  aspirin 81 mg oral Delayed Release (EC) tablet: 81 mg = 1 tab(s), Oral, Daily  calcium-vitamin D 600 mg-500 intl units oral tablet, extended release: 1 tab(s), Oral, Once, # 80 tab(s), 0 Refill(s)  carvedilol  6.25 mg oral tablet: 6.25 mg = 1 tab(s), Oral, BID  magnesium oxide 500 mg oral tablet: 500 mg = 1 tab(s), Oral, Daily, 0 Refill(s),    No qualifying data available        Histories   Past Medical History -   Past Social History -   Past Family History - unknown  Past surgical History -    Past Medical History:    Active  CAD (coronary artery disease) (414.00)  HTN (hypertension) (401.9)  High cholesterol (552576504)  hypothyroidism (22386950)  Resolved  IRREGULAR HEART BEAT (991636184):  Resolved.  kidney stone (802319692):  Resolved.  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  Chronic diarrhea (559551203):  Resolved.  Fecal incontinence (580532709):  Resolved.  Osteoporosis (096044999):  Resolved.   Family History:    History is unknown.   Procedure history:    Blood transfusion (964055282) on 2/28/2022 at 89 Years.  Bone density scan report (7135187551) on 6/1/2018 at 89 Years.  Bone density scan (144485450) on 6/1/2018 at 89 Years.  Comments:  6/4/2018 13:12 CDT - Fannie Wooten LPN  BCA  stent placement in the month of 11/2017 at 89 Years.  right shoulder sx. in 2014 at 88 Years.  Cholecystectomy; (49974).  Appendectomy; (05143).  partial abdominal hysterectomy (corpus and cervix), with or without removal of tube(s), with or without removal of ovary(s); with colpo-urethrocystopexy (eg, Elmer, Adrianne) (42008).  angiogram.  colonoscopy.  laser eye surgery.  cataracts.  bladder suspension.  Comments:  3/24/2013 11:43 CDT - Carl Maurice RN  x2  Placement of stent in cardiac conduit ([id number]).  Comments:  7/17/2015 14:25 CDT - Simi Ley  x2   Social History        Social & Psychosocial Habits    Alcohol  03/24/2013 Risk Assessment: Medium Risk    03/24/2013  Use: Current    Type: Beer    Frequency: Daily    Comment: 1 beer daily - 03/24/2013 11:35 - Carl Maurice RN    Employment/School  03/24/2013 Risk Assessment: No Risk    12/21/2015  Status:  Unemployed    Exercise  03/24/2013 Risk Assessment: Occasional exercise      Comment: DOES NOT EXERCISE - 12/21/2015 14:44 - Je CUNNINGHAM, Fannie    Home/Environment  03/24/2013 Risk Assessment: Low Risk    03/24/2013  Lives with: Alone    Nutrition/Health  03/24/2013 Risk Assessment: No Risk    12/21/2015  Type of diet: Regular    Sexual  12/21/2015  Sexually active: No    Substance Use  03/24/2013 Risk Assessment: Denies Substance Abuse    12/21/2015  Use: Never    Tobacco  03/24/2013 Risk Assessment: Denies Tobacco Use    04/14/2022  Use: Never (less than 100 in l    Patient Wants Consult For Cessation Counseling No    04/15/2022  Use: Never (less than 100 in l    Patient Wants Consult For Cessation Counseling N/A    Abuse/Neglect  04/14/2022  SHX Any signs of abuse or neglect No    Feels unsafe at home: No    Safe place to go: Yes    04/15/2022  SHX Any signs of abuse or neglect No  .        Physical Examination   General:  Alert and oriented, No acute distress.    Eye:  Pupils are equal, round and reactive to light, Extraocular movements are intact.    HENT:  Normocephalic, Tympanic membranes are clear.    Neck:  Supple, Non-tender, No jugular venous distention.    Respiratory:  Lungs are clear to auscultation, Respirations are non-labored.    Cardiovascular:  Normal rate, Regular rhythm.    Gastrointestinal:  Soft, Non-tender.       Vital Signs (last 24 hrs)_____  Last Charted___________  Heart Rate Peripheral   64 bpm  (APR 15 14:34)  Resp Rate         22 br/min  (APR 15 13:30)  SBP      115 mmHg  (APR 15 14:34)  DBP      L 54mmHg  (APR 15 14:34)  SpO2      100 %  (APR 15 14:34)     Genitourinary:  No costovertebral angle tenderness, No inguinal tenderness.    Lymphatics:  No lymphadenopathy neck, axilla, groin.    Musculoskeletal:  Normal range of motion.    Integumentary:  Warm, Dry, Pink.    Neurologic:  Oriented, Normal sensory, Normal motor function, Cranial Nerves II-XII are grossly intact.    Cognition and  Speech:  Oriented, Speech clear and coherent, Functional cognition intact.    Psychiatric:  Cooperative, Appropriate mood & affect.       Review / Management   Results review:     Labs (Last four charted values)  WBC                  L 3.4 (APR 15)   Hgb                  L 7.7 (APR 15)   Hct                  L 24.0 (APR 15)   Plt                  251 (APR 15)   Na                   144 (APR 15)   K                    4.2 (APR 15)   CO2                  25 (APR 15)   Cl                   110 (APR 15)   Cr                   0.77 (APR 15)   BUN                  H 24.3 (APR 15)   Glucose Random       101 (APR 15) .    Laboratory Results   Today's Lab Results : PowerNote Discrete Results   4/15/2022 13:30 CDT      Additional Findings           4/15/2022 15:00 CDT      UA Appear                 SLIGHTLY CLOUDY                             UA Color                  YELLOW                             UA Spec Grav              1.015                             UA Bili                   Negative                             UA pH                     7.0                             UA Urobilinogen           Negative                             UA Blood                  Negative                             UA Glucose                Negative                             UA Ketones                Negative                             UA Protein                Negative                             UA Nitrite                Negative                             UA Leuk Est               3+                             UA WBC                    5-10 /HPF                             UA RBC                    0                             UA Bacteria               Many /HPF                             UA Squam Epithelial       None Seen /LPF    4/15/2022 13:30 CDT      WBC                       3.4 x10(3)/mcL  LOW                             RBC                       2.01 x10(6)/mcL  LOW                             Hgb                        7.7 gm/dL  LOW                             Hct                       24.0 %  LOW                             Platelet                  251 x10(3)/mcL                             MCV                       119.4 fL  HI                             MCH                       38.3 pg  HI                             MCHC                      32.1 gm/dL  LOW                             RDW                       18.2 %  HI                             MPV                       13.5 fL  HI                             Abs Neut                  1.20 x10(3)/mcL  LOW                             Neutro Auto               35.5 %  LOW                             Lymph Auto                47.3 %  HI                             Mono Auto                 8.3 %                             Eos Auto                  4.4 %                             Abs Eos                   0.15 x10(3)/mcL                             Basophil Auto             1.8 %  HI                             Abs Neutro                1.20 x10(3)/mcL  LOW                             Abs Lymph                 1.60 x10(3)/mcL                             Abs Mono                  0.28 x10(3)/mcL                             Abs Baso                  0.06 x10(3)/mcL                             NRBC%                     0.0 %                             IG%                       2.700 %  HI                             IG#                       0.0900  HI                             Hypochrom                 Slight                             Platelet Est              Adequate                             Anisocyte                 2+                             Macrocyte                 3+                             RBC Morph                 Abnormal                             Sodium Lvl                144 mmol/L                             Potassium Lvl             4.2 mmol/L                             Chloride                  110 mmol/L                             CO2                        25 mmol/L                             Calcium Lvl               9.3 mg/dL                             Glucose Lvl               101 mg/dL                             BUN                       24.3 mg/dL  HI                             Creatinine                0.77 mg/dL                             eGFR-AA                   >60  NA                             eGFR-RICH                  >60 mL/min/1.73 m2  NA                             Bili Total                0.4 mg/dL                             Bili Direct               0.1 mg/dL                             Bili Indirect             0.30 mg/dL                             AST                       15 unit/L                             ALT                       14 unit/L                             Alk Phos                  78 unit/L                             Total Protein             6.7 gm/dL                             Albumin Lvl               4.0 gm/dL                             Globulin                  2.7 gm/dL                             A/G Ratio                 1.5 ratio                             Troponin-I                <0.01 ng/mL                             ABO/Rh                    A POS                             Antibody Screen           Negative                             Crossmatch                Compatible                             Crossmatch                Compatible                             Product Ready             2 units rbc                             Product Ready             2 units rbc     , Reviewed labs   Chest x-ray results   Reviewed radiologist's report   Radiology results   Rad Results ()   Accession: [id number]  Order: XR Chest 1 View  Report Dt/Tm: 04/15/2022 14:37  Report:   EXAMINATION  XR Chest 1 View     INDICATION  Shortness of Breath     Comparison: Chest x-ray dated 2/18/2022     FINDINGS  The heart is stable in size. There is no focal airspace consolidation.  There is no pleural  effusion or visible pneumothorax.     IMPRESSION:  No acute abnormality of the chest.       Documentation reviewed   DVT Prophyl -       Impression and Plan   Education and Follow-up:       Counseled: Patient, Regarding diagnosis, Regarding treatment.    Recurrent anemia  Hypertension    Plan    2 units PRBC/CBC in a.m.  Home meds

## 2022-05-18 PROBLEM — D53.9 MACROCYTIC ANEMIA: Status: ACTIVE | Noted: 2022-01-01

## 2022-05-18 PROBLEM — D70.8 OTHER NEUTROPENIA: Status: ACTIVE | Noted: 2022-01-01

## 2022-05-19 NOTE — PROGRESS NOTES
Subjective:       Patient ID: Edith Alvarado is a 89 y.o. female.    Chief Complaint: Follow-up, Fatigue, and Shortness of Breath (For the last 2 weeks)      Diagnosis:  1.  Leukopenia  2.  Neutropenia  3.  Anemia  4.  Macrocytosis    Current Treatment:   Observation with as needed transfusions.    Treatment History:  N/A    HPI   Patient who presented to see me for leukopenia/neutropenia, and macrocytic anemia.  Her anemia dates back to 8/31/2013.  Her macrocytosis started on 8/10/2017, her anemia persisted and she became leukopenic on 6/18/2018.  Her B12 on 4/26/2019 was normal at 476, on 7/1/2019 was 963, and on 7/2/2020 was 1012.  She was referred to hematology for further evaluation.  She presented to see me initially on 8/11/2020.  At that visit, she had vision changes, decreased hearing, dental problems, swelling in her legs, constipation, dark stools, hemorrhoids, urinary frequency/urgency, and some easy bruising.  She denied any fatigue, weight loss, fever, chills, night sweats, chest pain, shortness of breath, cough, trouble swallowing, abdominal pain, nausea, vomiting, diarrhea, hematuria, dysuria, numbness/tingling.  Work-up on that day revealed normal folic acid, ceruloplasmin, and copper.  Her hemoglobin on that day was 9.6.  At that visit, we discussed bone marrow biopsy versus observation, plan was for observation every 3 months.  Patient did not follow up with me after her initial visit.  She was following with her PCP.  Lab work done on 8/9/2021 that revealed a hemoglobin of 7.5, she was admitted to the emergency department, underwent a blood transfusion of 2 units of packed red blood cells, follow-up CBC on 8/23/2021 showed increase in hemoglobin up to 10.5.  She represented to see me on 9/14/2021,  hemoglobin on that day was 9.6.          Interval History:   Patient presents to clinic for a follow up appointment via walker; family member present. Last transfusion was in 4/2022.  She continues  to c/o fatigue, SOB, and dizziness. She is not interested in a bone marrow biopsy at this time.        Past Medical History:   Diagnosis Date    Anemia     CAD (coronary artery disease)     Leukopenia     Neutropenia       Past Surgical History:   Procedure Laterality Date    APPENDECTOMY      BLADDER SUSPENSION      cataract surgery      CHOLECYSTECTOMY      COLONOSCOPY      CORONARY STENT PLACEMENT      HYSTERECTOMY       Social History     Socioeconomic History    Marital status:    Tobacco Use    Smoking status: Never Smoker    Smokeless tobacco: Never Used   Substance and Sexual Activity    Alcohol use: Not Currently    Drug use: Never      Family History   Family history unknown: Yes      Review of patient's allergies indicates:   Allergen Reactions    Lortab [hydrocodone-acetaminophen] Hives    Codeine Rash    Ranexa [ranolazine]       Review of Systems   Constitutional: Positive for fatigue. Negative for chills, diaphoresis, fever and unexpected weight change.   HENT: Negative for nasal congestion, mouth sores, sinus pressure/congestion and sore throat.    Eyes: Negative for pain and visual disturbance.   Respiratory: Positive for shortness of breath. Negative for cough and chest tightness.    Cardiovascular: Negative for chest pain, palpitations and leg swelling.   Gastrointestinal: Negative for abdominal distention, abdominal pain, blood in stool, constipation and diarrhea.   Genitourinary: Negative for dysuria, frequency and hematuria.   Musculoskeletal: Negative for arthralgias and back pain.   Integumentary:  Negative for rash.   Neurological: Negative for dizziness, weakness, numbness and headaches.   Hematological: Negative for adenopathy.   Psychiatric/Behavioral: Negative for confusion.         Objective:      Physical Exam    LABS AND IMAGING REVIEWED IN EPIC          Assessment:     1.  Leukopenia  2.  Neutropenia  3.  Anemia  4.  Macrocytosis      Plan:         At this  point, the patient has had a chronic macrocytic anemia and a chronic leukopenia.  These have worsened recently.  Her transfusion requirements are getting closer to each other.    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  Clinically, at that time she is acting.  Once again discussed bone marrow biopsy.  Due to her age and the fact that she would not want any aggressive treatment if anything was found, the patient does not want a bone marrow biopsy.  I will get an EPO level and try to get erythropoietin stimulating agent approved through her insurance to see if this helps with her transfusion requirements.    I did discuss the fact that if she had a 5 q. deletion she could get Revlimid or if she had ring sideroblasts she could get Luspatercept.  These are both medications and I think the patient would tolerate. She and her family do not think that she would tolerate the bone marrow biopsy.  They just want her to be able to enjoy whatever time she has left.  We can continue to support her with as needed transfusions.      Will check EPO level today    Return to clinic in 2 weeks with repeat CBC and CMP    Jeremiah Jeffries II, MD I, Mara Matamoros LPN, acted solely as a scribe for and in the presence of, Dr. Jeremiah Jeffries who performed the service.

## 2022-06-02 NOTE — PROGRESS NOTES
Subjective:       Patient ID: Edith Alvarado is a 89 y.o. female.    Chief Complaint: Follow-up (2 week follow up--no new problems )      Diagnosis:  1.  Leukopenia  2.  Neutropenia  3.  Anemia  4.  Macrocytosis    Current Treatment:   Observation with as needed transfusions.    Treatment History:  N/A    Follow-up  Associated symptoms include fatigue. Pertinent negatives include no abdominal pain, arthralgias, chest pain, chills, congestion, coughing, diaphoresis, fever, headaches, numbness, rash, sore throat or weakness.      Patient who presented to see me for leukopenia/neutropenia, and macrocytic anemia.  Her anemia dates back to 8/31/2013.  Her macrocytosis started on 8/10/2017, her anemia persisted and she became leukopenic on 6/18/2018.  Her B12 on 4/26/2019 was normal at 476, on 7/1/2019 was 963, and on 7/2/2020 was 1012.  She was referred to hematology for further evaluation.  She presented to see me initially on 8/11/2020.  At that visit, she had vision changes, decreased hearing, dental problems, swelling in her legs, constipation, dark stools, hemorrhoids, urinary frequency/urgency, and some easy bruising.  She denied any fatigue, weight loss, fever, chills, night sweats, chest pain, shortness of breath, cough, trouble swallowing, abdominal pain, nausea, vomiting, diarrhea, hematuria, dysuria, numbness/tingling.  Work-up on that day revealed normal folic acid, ceruloplasmin, and copper.  Her hemoglobin on that day was 9.6.  At that visit, we discussed bone marrow biopsy versus observation, plan was for observation every 3 months.  Patient did not follow up with me after her initial visit.  She was following with her PCP.  Lab work done on 8/9/2021 that revealed a hemoglobin of 7.5, she was admitted to the emergency department, underwent a blood transfusion of 2 units of packed red blood cells, follow-up CBC on 8/23/2021 showed increase in hemoglobin up to 10.5.  She represented to see me on  9/14/2021,  hemoglobin on that day was 9.6.          Interval History:   Patient presents to clinic for a follow up appointment via walker; family member present. Last transfusion was in 4/2022.  She continues to c/o fatigue, SOB, and dizziness which has been worsening lately, no CP. She remains uninterested in a bone marrow biopsy at this time.        Past Medical History:   Diagnosis Date    Anemia     CAD (coronary artery disease)     Leukopenia     Neutropenia       Past Surgical History:   Procedure Laterality Date    APPENDECTOMY      BLADDER SUSPENSION      cataract surgery      CHOLECYSTECTOMY      COLONOSCOPY      CORONARY STENT PLACEMENT      HYSTERECTOMY       Social History     Socioeconomic History    Marital status:    Tobacco Use    Smoking status: Never Smoker    Smokeless tobacco: Never Used   Substance and Sexual Activity    Alcohol use: Not Currently    Drug use: Never      Family History   Family history unknown: Yes      Review of patient's allergies indicates:   Allergen Reactions    Lortab [hydrocodone-acetaminophen] Hives    Codeine Rash    Ranexa [ranolazine]       Review of Systems   Constitutional: Positive for fatigue. Negative for chills, diaphoresis, fever and unexpected weight change.   HENT: Negative for nasal congestion, mouth sores, sinus pressure/congestion and sore throat.    Eyes: Negative for pain and visual disturbance.   Respiratory: Positive for shortness of breath. Negative for cough and chest tightness.    Cardiovascular: Negative for chest pain, palpitations and leg swelling.   Gastrointestinal: Negative for abdominal distention, abdominal pain, blood in stool, constipation and diarrhea.   Genitourinary: Negative for dysuria, frequency and hematuria.   Musculoskeletal: Negative for arthralgias and back pain.   Integumentary:  Negative for rash.   Neurological: Negative for dizziness, weakness, numbness and headaches.   Hematological: Negative for  adenopathy.   Psychiatric/Behavioral: Negative for confusion.         Objective:      Physical Exam  Vitals reviewed.   Constitutional:       General: She is awake. She is not in acute distress.     Appearance: Normal appearance. She is well-groomed.   HENT:      Head: Normocephalic.      Left Ear: Hearing normal.   Eyes:      General: No scleral icterus.     Extraocular Movements: Extraocular movements intact.      Conjunctiva/sclera: Conjunctivae normal.   Cardiovascular:      Rate and Rhythm: Normal rate and regular rhythm.   Pulmonary:      Effort: Pulmonary effort is normal.      Breath sounds: Normal breath sounds.   Chest:      Chest wall: No tenderness.   Abdominal:      General: Bowel sounds are normal. There is no distension.      Palpations: Abdomen is soft.      Tenderness: There is no abdominal tenderness. There is no guarding.   Musculoskeletal:         General: No swelling, tenderness or deformity.      Cervical back: Full passive range of motion without pain and neck supple.      Right lower leg: No edema.      Left lower leg: No edema.   Skin:     General: Skin is warm and dry.      Coloration: Skin is pale. Skin is not jaundiced.   Neurological:      General: No focal deficit present.      Mental Status: She is alert and oriented to person, place, and time.      Motor: Weakness present.      Gait: Gait is intact.   Psychiatric:         Mood and Affect: Mood normal.         Speech: Speech normal.         Behavior: Behavior normal. Behavior is cooperative.         LABS RE[facility]          Assessment:     1.  Leukopenia  2.  Neutropenia  3.  Anemia  4.  Macrocytosis      Plan:         At this point, the patient has had a chronic macrocytic anemia and a chronic leukopenia.  These have worsened recently.  Her transfusion requirements are getting closer to each other.    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  Clinically, at that time she is acting.   Once again discussed bone marrow biopsy.  Due to her age and the fact that she would not want any aggressive treatment if anything was found, the patient does not want a bone marrow biopsy.     I did discuss the fact that if she had a 5 q. deletion she could get Revlimid or if she had ring sideroblasts she could get Luspatercept.  These are both medications and I think the patient would tolerate. She and her family do not think that she would tolerate the bone marrow biopsy.  They just want her to be able to enjoy whatever time she has left.  We can continue to support her with as needed transfusions.      EPO level 181 therefore she does not qualify for MEHUL without a diagnosis of MDS    Return to clinic in 2 weeks with repeat CBC and CMP       JASMIN Ochoa

## 2022-06-16 NOTE — PROGRESS NOTES
Subjective:       Patient ID: Edith Alvarado is a 89 y.o. female.    Chief Complaint: Follow-up (Patient stated no new problems )      Diagnosis:  1.  Leukopenia  2.  Neutropenia  3.  Anemia  4.  Macrocytosis    Current Treatment:   Observation with as needed transfusions.    Treatment History:  N/A    Follow-up  Associated symptoms include fatigue. Pertinent negatives include no abdominal pain, arthralgias, chest pain, chills, congestion, coughing, diaphoresis, fever, headaches, numbness, rash, sore throat or weakness.      Patient who presented to see me for leukopenia/neutropenia, and macrocytic anemia.  Her anemia dates back to 8/31/2013.  Her macrocytosis started on 8/10/2017, her anemia persisted and she became leukopenic on 6/18/2018.  Her B12 on 4/26/2019 was normal at 476, on 7/1/2019 was 963, and on 7/2/2020 was 1012.  She was referred to hematology for further evaluation.  She presented to see me initially on 8/11/2020.  At that visit, she had vision changes, decreased hearing, dental problems, swelling in her legs, constipation, dark stools, hemorrhoids, urinary frequency/urgency, and some easy bruising.  She denied any fatigue, weight loss, fever, chills, night sweats, chest pain, shortness of breath, cough, trouble swallowing, abdominal pain, nausea, vomiting, diarrhea, hematuria, dysuria, numbness/tingling.  Work-up on that day revealed normal folic acid, ceruloplasmin, and copper.  Her hemoglobin on that day was 9.6.  At that visit, we discussed bone marrow biopsy versus observation, plan was for observation every 3 months.  Patient did not follow up with me after her initial visit.  She was following with her PCP.  Lab work done on 8/9/2021 that revealed a hemoglobin of 7.5, she was admitted to the emergency department, underwent a blood transfusion of 2 units of packed red blood cells, follow-up CBC on 8/23/2021 showed increase in hemoglobin up to 10.5.  She represented to see me on 9/14/2021,   hemoglobin on that day was 9.6.          Interval History:   Patient presents to clinic for a follow up appointment via walker; by her son.  Last transfusion was in 6/2/22.  She is feeling pretty good today.  No issues with worsening shortness of breath or chest pain lately.  She saw her cardiologist earlier this week who also recommended a bone marrow biopsy.  She is now considering this.  She remains independent and lives alone.        Past Medical History:   Diagnosis Date    Anemia     CAD (coronary artery disease)     Leukopenia     Macrocytic anemia     Neutropenia     Neutropenia       Past Surgical History:   Procedure Laterality Date    APPENDECTOMY      BLADDER SUSPENSION      cataract surgery      CHOLECYSTECTOMY      COLONOSCOPY      CORONARY STENT PLACEMENT      HYSTERECTOMY       Social History     Socioeconomic History    Marital status:    Tobacco Use    Smoking status: Never Smoker    Smokeless tobacco: Never Used   Substance and Sexual Activity    Alcohol use: Not Currently    Drug use: Never      Family History   Family history unknown: Yes      Review of patient's allergies indicates:   Allergen Reactions    Lortab [hydrocodone-acetaminophen] Hives    Codeine Rash    Ranexa [ranolazine]       Review of Systems   Constitutional: Positive for fatigue. Negative for chills, diaphoresis, fever and unexpected weight change.   HENT: Negative for nasal congestion, mouth sores, sinus pressure/congestion and sore throat.    Eyes: Negative for pain and visual disturbance.   Respiratory: Positive for shortness of breath. Negative for cough and chest tightness.    Cardiovascular: Negative for chest pain, palpitations and leg swelling.   Gastrointestinal: Negative for abdominal distention, abdominal pain, blood in stool, constipation and diarrhea.   Genitourinary: Negative for dysuria, frequency and hematuria.   Musculoskeletal: Negative for arthralgias and back pain.    Integumentary:  Negative for rash.   Neurological: Negative for dizziness, weakness, numbness and headaches.   Hematological: Negative for adenopathy.   Psychiatric/Behavioral: Negative for confusion.         Objective:      Physical Exam  Vitals reviewed.   Constitutional:       General: She is awake. She is not in acute distress.     Appearance: Normal appearance. She is well-groomed.   HENT:      Head: Normocephalic.      Left Ear: Hearing normal.   Eyes:      General: No scleral icterus.     Extraocular Movements: Extraocular movements intact.      Conjunctiva/sclera: Conjunctivae normal.   Cardiovascular:      Rate and Rhythm: Normal rate and regular rhythm.   Pulmonary:      Effort: Pulmonary effort is normal.      Breath sounds: Normal breath sounds.   Chest:      Chest wall: No tenderness.   Abdominal:      General: Bowel sounds are normal. There is no distension.      Palpations: Abdomen is soft.      Tenderness: There is no abdominal tenderness. There is no guarding.   Musculoskeletal:         General: No swelling, tenderness or deformity.      Cervical back: Full passive range of motion without pain and neck supple.      Right lower leg: No edema.      Left lower leg: No edema.   Skin:     General: Skin is warm and dry.      Coloration: Skin is pale. Skin is not jaundiced.   Neurological:      General: No focal deficit present.      Mental Status: She is alert and oriented to person, place, and time.      Motor: Weakness present.      Gait: Gait is intact.   Psychiatric:         Mood and Affect: Mood normal.         Speech: Speech normal.         Behavior: Behavior normal. Behavior is cooperative.         LABS REVIEWED [facility]          Assessment:     1.  Leukopenia  2.  Neutropenia  3.  Anemia  4.  Macrocytosis      Plan:         At this point, the patient has had a chronic macrocytic anemia and a chronic leukopenia.  These have worsened recently.  Her transfusion requirements are getting closer to  each other.    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  Clinically, at that time she is acting.  Once again discussed bone marrow biopsy.  The patient and her son are now considering this.    I did discuss the fact that if she had a 5 q. deletion she could get Revlimid or if she had ring sideroblasts she could get Luspatercept.  These are both medications and I think the patient would tolerate. She and her family do not think that she would tolerate the bone marrow biopsy.  They just want her to be able to enjoy whatever time she has left.  We can continue to support her with as needed transfusions.      EPO level 181 therefore she does not qualify for MEHUL without a diagnosis of MDS    Return to clinic in 2 weeks with repeat CBC and CMP    All of their questions were answered to the best of my ability.  They know to call if she has any worsening symptoms before her return visit.     Yennifer Lantigua, ERIN-C

## 2022-07-01 NOTE — PROGRESS NOTES
Subjective:       Patient ID: Edith Alvarado is a 89 y.o. female.    Chief Complaint: Follow-up      Diagnosis:  1.  Leukopenia  2.  Neutropenia  3.  Anemia  4.  Macrocytosis    Current Treatment:   Observation with as needed transfusions.    Treatment History:  N/A    Follow-up  Associated symptoms include fatigue. Pertinent negatives include no abdominal pain, arthralgias, chest pain, chills, congestion, coughing, diaphoresis, fever, headaches, numbness, rash, sore throat or weakness.      Patient who presented to see me for leukopenia/neutropenia, and macrocytic anemia.  Her anemia dates back to 8/31/2013.  Her macrocytosis started on 8/10/2017, her anemia persisted and she became leukopenic on 6/18/2018.  Her B12 on 4/26/2019 was normal at 476, on 7/1/2019 was 963, and on 7/2/2020 was 1012.  She was referred to hematology for further evaluation.  She presented to see me initially on 8/11/2020.  At that visit, she had vision changes, decreased hearing, dental problems, swelling in her legs, constipation, dark stools, hemorrhoids, urinary frequency/urgency, and some easy bruising.  She denied any fatigue, weight loss, fever, chills, night sweats, chest pain, shortness of breath, cough, trouble swallowing, abdominal pain, nausea, vomiting, diarrhea, hematuria, dysuria, numbness/tingling.  Work-up on that day revealed normal folic acid, ceruloplasmin, and copper.  Her hemoglobin on that day was 9.6.  At that visit, we discussed bone marrow biopsy versus observation, plan was for observation every 3 months.  Patient did not follow up with me after her initial visit.  She was following with her PCP.  Lab work done on 8/9/2021 that revealed a hemoglobin of 7.5, she was admitted to the emergency department, underwent a blood transfusion of 2 units of packed red blood cells, follow-up CBC on 8/23/2021 showed increase in hemoglobin up to 10.5.  She represented to see me on 9/14/2021,  hemoglobin on that day was  9.6.          Interval History:   Patient presents to clinic for a follow up appointment via walker; by her son. She is feeling pretty good today other than fatigue.  No issues with worsening shortness of breath or chest pain lately. She remains independent and lives alone. She has decided to have a bone marrow biopsy.        Past Medical History:   Diagnosis Date    Anemia     CAD (coronary artery disease)     Leukopenia     Macrocytic anemia     Neutropenia     Neutropenia       Past Surgical History:   Procedure Laterality Date    APPENDECTOMY      BLADDER SUSPENSION      cataract surgery      CHOLECYSTECTOMY      COLONOSCOPY      CORONARY STENT PLACEMENT      HYSTERECTOMY       Social History     Socioeconomic History    Marital status:    Tobacco Use    Smoking status: Never Smoker    Smokeless tobacco: Never Used   Substance and Sexual Activity    Alcohol use: Not Currently    Drug use: Never      Family History   Family history unknown: Yes      Review of patient's allergies indicates:   Allergen Reactions    Lortab [hydrocodone-acetaminophen] Hives    Codeine Rash    Ranexa [ranolazine]       Review of Systems   Constitutional: Positive for fatigue. Negative for chills, diaphoresis, fever and unexpected weight change.   HENT: Negative for nasal congestion, mouth sores, sinus pressure/congestion and sore throat.    Eyes: Negative for pain and visual disturbance.   Respiratory: Positive for shortness of breath. Negative for cough and chest tightness.    Cardiovascular: Negative for chest pain, palpitations and leg swelling.   Gastrointestinal: Negative for abdominal distention, abdominal pain, blood in stool, constipation and diarrhea.   Genitourinary: Negative for dysuria, frequency and hematuria.   Musculoskeletal: Negative for arthralgias and back pain.   Integumentary:  Negative for rash.   Neurological: Negative for dizziness, weakness, numbness and headaches.   Hematological:  Negative for adenopathy.   Psychiatric/Behavioral: Negative for confusion.         Objective:      Physical Exam  Vitals reviewed.   Constitutional:       General: She is awake. She is not in acute distress.     Appearance: Normal appearance. She is well-groomed.   HENT:      Head: Normocephalic.      Right Ear: Decreased hearing noted.      Left Ear: Decreased hearing noted.   Eyes:      General: No scleral icterus.     Extraocular Movements: Extraocular movements intact.   Pulmonary:      Effort: Pulmonary effort is normal.   Abdominal:      General: There is no distension.   Musculoskeletal:         General: No swelling, tenderness or deformity.      Cervical back: Full passive range of motion without pain and neck supple.      Right lower leg: Edema present.      Left lower leg: Edema present.   Skin:     General: Skin is warm and dry.      Coloration: Skin is pale. Skin is not jaundiced.   Neurological:      General: No focal deficit present.      Mental Status: She is alert and oriented to person, place, and time.      Motor: Weakness present.      Gait: Gait is intact.   Psychiatric:         Mood and Affect: Mood normal.         Speech: Speech normal.         Behavior: Behavior normal. Behavior is cooperative.         LABS REVIEWED IN [facility]          Assessment:     1.  Leukopenia  2.  Neutropenia  3.  Anemia  4.  Macrocytosis      Plan:         At this point, the patient has had a chronic macrocytic anemia and a chronic leukopenia.  These have worsened recently.  Her transfusion requirements are getting closer to each other.    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  Clinically, at that time she is acting.  Once again discussed bone marrow biopsy.  The patient and her son are now ready to proceed with this.    I did discuss the fact that if she had a 5 q. deletion she could get Revlimid or if she had ring sideroblasts she could get Luspatercept.  These are both  medications and I think the patient would tolerate. She and her family do not think that she would tolerate the bone marrow biopsy.  They just want her to be able to enjoy whatever time she has left.  We can continue to support her with as needed transfusions.      EPO level 181 therefore she does not qualify for MEHUL without a diagnosis of MDS    Return to clinic in 5 weeks with repeat CBC and CMP    Weekly CBC, CMP on thursdays    Hgb 7.0 today, Wednesday is the earliest we can give blood transfusion in infusion clinic. Gave ER precautions in the meantime. She will ikely need transfusion before then.     All of their questions were answered to the best of my ability.  They know to call if she has any worsening symptoms before her return visit.     ANETTE OchoaC

## 2022-07-05 NOTE — PROCEDURES
Edith Alvarado is a 89 y.o. female patient.    Temp: 98.3 °F (36.8 °C) (07/05/22 0758)  Pulse: 63 (07/05/22 1038)  Resp: 20 (07/05/22 1032)  BP: (!) 115/39 (07/05/22 1032)  SpO2: 100 % (07/05/22 1038)  Mallampati Scale: Class II  ASA Classification: Class 3    Bone marrow    Date/Time: 7/5/2022 10:41 AM  Performed by: Calin Carrillo MD  Authorized by: Calin Carrillo MD     Consent Done?: Yes (Verbal)   Immediately prior to procedure a time out was called to verify the correct patient, procedure, equipment, support staff and site/side marked as required.   Patient was prepped and draped in the usual sterile fashion.    I was present for the entire procedure.    Position: left lateral  Local anesthetic: lidocaine 1% without epinephrine  Aspiration?: Yes   Biopsy?: Yes    Specimen source: posterior iliac crest  Patient tolerated the procedure well with no immediate complications.  Post-operative instructions were provided for the patient.  Patient was discharged and will follow up if any complications occur.         7/5/2022

## 2022-07-05 NOTE — PROCEDURES
Edith Alvarado is a 89 y.o. female patient.    Temp: 98.3 °F (36.8 °C) (07/05/22 0758)  Pulse: 65 (07/05/22 0758)  Resp: 17 (07/05/22 0758)  BP: (!) 129/55 (07/05/22 0758)  SpO2: (!) 94 % (07/05/22 0758)  Mallampati Scale: Class II  ASA Classification: Class 3    Procedures    7/5/2022

## 2022-07-05 NOTE — DISCHARGE INSTRUCTIONS
Keep biopsy site (right hip) dry for 24 hours, then you may remove bandaid and shower.  Gently clean biopsy site with soap and water daily starting tomorrow 7/6/2022.    Do not submerge site under water for 1 week following procedure (no tub baths, swimming).    Report to ER with any bleeding from site or signs of infection.    May take over the counter meds for soreness/pain/discomfort and apply ice packs as needed.

## 2022-07-05 NOTE — DISCHARGE SUMMARY
[facility]  Discharge Note  Short Stay    Procedure(s) (LRB):  ASPIRATION, BONE MARROW (N/A)    OUTCOME: Patient tolerated treatment/procedure well without complication and is now ready for discharge.    DISPOSITION: Home or Self Care    FINAL DIAGNOSIS:  Pending    FOLLOWUP: In clinic    DISCHARGE INSTRUCTIONS:  No discharge procedures on file.      Clinical Reference Documents Added to Patient Instructions       Document    BONE MARROW ASPIRATION OR BIOPSY (ENGLISH)          TIME SPENT ON DISCHARGE: 20 minutes

## 2022-07-06 NOTE — PLAN OF CARE
1 unit of blood transfused without incident. Pt was discharged without complaints with her daughter.

## 2022-07-19 NOTE — TELEPHONE ENCOUNTER
----- Message from Gui Jackson sent at 7/19/2022  1:53 PM CDT -----  .Type:  Needs Medical Advice    Who Called: Edith daughter  Symptoms (please be specific):    How long has patient had these symptoms:    Pharmacy name and phone #:    Would the patient rather a call back or a response via [facility]?   Best Call Back Number: [phone]  Additional Information:   She would like a call back to confirm blood work was receive for her mother ahead of her apt tomorrow.

## 2022-07-20 PROBLEM — Z78.0 POSTMENOPAUSAL: Status: ACTIVE | Noted: 2022-01-01

## 2022-07-20 PROBLEM — Z00.00 ENCOUNTER FOR SUBSEQUENT ANNUAL WELLNESS VISIT (AWV) IN MEDICARE PATIENT: Status: ACTIVE | Noted: 2022-01-01

## 2022-07-20 PROBLEM — Z71.89 ADVANCED CARE PLANNING/COUNSELING DISCUSSION: Status: ACTIVE | Noted: 2022-01-01

## 2022-07-20 NOTE — PROGRESS NOTES
Subjective:        Patient ID: Edith Alvarado is a 89 y.o. female.    Chief Complaint: Medicare AWV (Wellness- patient has labs in chart to review )      Mrs. Venegas presents to the clinic with her daughter Edith for her wellness visit.  She is due for lab work.    The patient has a history of hypertension, hyperlipidemia and CAD which is well controlled on her current medications.  Her cardiologist is Dr. Gao. She is on baby aspirin.  She sees him annually.  Her last appointment was June 2022.  She has follow-up in 1 year. No changes    She has a history of hypothyroidism and is compliant with her Synthroid.    He has a history of anemia and leukopenia.  She is following with Hematology.  They did a bone marrow biopsy on June 5, 2022. She does not know the results yet.  She has an appointment scheduled with them on August 5, 2022.    She has a history of constipation.  She is taking Metamucil.  Not on any stool softeners.  She is having bowel movements about every other day.    He is mobility impaired.  She uses a rolling walker and has a handicap tag.  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  She sees Dr. Vernon as well as Dr. Martinez.  She lives alone.  She has a Life Alert necklace.  She currently has [facility] health nurse coming once a week.  They draw her blood work at home for hematology.    She has a history of urinary frequency.  She reports also history of bladder prolapse.  She had a partial hysterectomy at age 33 and a bladder lift at that time.  She reports 12 pregnancies and 8 children    She is postmenopausal.  She is osteopenic per her last bone density test June 2018 at [facility].  She is on calcium and vitamin-D.  She declines DEXA            Advance Care Planning  Advanced care planning discussed and paperwork given.    I attest that I have had a face to face discussion with patient and or surrogate decision  maker.   Included surrogate decision maker: NO  Advanced directive in chart: NO  LAPOST: NO    Total time spent: 16 minutes              Review of Systems   Constitutional: Negative.    HENT: Negative.    Eyes: Positive for visual disturbance.   Respiratory: Negative.    Cardiovascular: Negative.    Gastrointestinal: Positive for constipation.   Endocrine: Negative.    Genitourinary: Negative.    Musculoskeletal: Positive for gait problem.   Skin: Negative.    Allergic/Immunologic: Negative.    Hematological: Negative for adenopathy. Does not bruise/bleed easily.   Psychiatric/Behavioral: Negative.          Review of patient's allergies indicates:   Allergen Reactions    Lortab [hydrocodone-acetaminophen] Hives    Codeine Rash    Ranexa [ranolazine]       Vitals:    07/20/22 1445   BP: 108/68   BP Location: Left arm   Pulse: 64   Resp: 16   Temp: 98 °F (36.7 °C)   SpO2: 98%   Weight: 60 kg (132 lb 3.2 oz)      Social History     Socioeconomic History    Marital status:    Tobacco Use    Smoking status: Never Smoker    Smokeless tobacco: Never Used   Substance and Sexual Activity    Alcohol use: Not Currently    Drug use: Never      Family History   Family history unknown: Yes          Objective:     Physical Exam  Vitals and nursing note reviewed.   Constitutional:       Appearance: Normal appearance. She is normal weight.   HENT:      Head: Normocephalic and atraumatic.      Nose: Nose normal.      Mouth/Throat:      Mouth: Mucous membranes are moist.      Pharynx: Oropharynx is clear.   Eyes:      Extraocular Movements: Extraocular movements intact.   Cardiovascular:      Rate and Rhythm: Normal rate and regular rhythm.      Pulses: Normal pulses.      Heart sounds: Normal heart sounds.   Pulmonary:      Effort: Pulmonary effort is normal.      Breath sounds: Normal breath sounds.   Musculoskeletal:         General: Normal range of motion.      Cervical back: Normal range of motion.      Comments:  Ambulates with rolling walker   Skin:     General: Skin is warm and dry.   Neurological:      General: No focal deficit present.      Mental Status: She is alert and oriented to person, place, and time. Mental status is at baseline.   Psychiatric:         Mood and Affect: Mood normal.       Current Outpatient Medications on File Prior to Visit   Medication Sig Dispense Refill    aspirin (ECOTRIN) 81 MG EC tablet Take 81 mg by mouth once daily.      calcium carbonate (TUMS) 200 mg calcium (500 mg) chewable tablet Take 1 tablet by mouth once daily.      carvediloL (COREG) 6.25 MG tablet Take 6.25 mg by mouth 2 (two) times daily.      ergocalciferol (ERGOCALCIFEROL) 50,000 unit Cap Take 5,000 Units by mouth nightly.      isosorbide mononitrate (IMDUR) 60 MG 24 hr tablet Take 60 mg by mouth once daily.      levothyroxine (SYNTHROID) 88 MCG tablet Take 1 tablet (88 mcg total) by mouth once daily. 30 tablet 3    losartan (COZAAR) 100 MG tablet Take 100 mg by mouth once daily.      pravastatin (PRAVACHOL) 20 MG tablet Take 20 mg by mouth once daily.       No current facility-administered medications on file prior to visit.     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 Macrocytic anemia  Assessment & Plan:  Keep appointments with heme/onc.    Orders:  -     Lipid Panel  -     TSH  -     Urinalysis, Reflex to Urine Culture Urine, Clean Catch    2. Encounter for subsequent annual wellness visit (AWV) in Medicare patient  Assessment & Plan:  Fasting labs ordered. Will call with results when available  Declines dexa  Declines immunizations    Advanced care planning discussed and paperwork given    Orders:  -     Lipid Panel  -     TSH  -     Urinalysis, Reflex to Urine Culture Urine, Clean Catch    3. Advanced care planning/counseling discussion  Assessment & Plan:  Advanced care planning discussed and paperwork given.    I attest that I have had a face to face discussion with patient and or surrogate decision maker.   Included surrogate decision maker: NO  Advanced directive in chart: NO  LAPOST: NO    Total time spent: 16 minutes      Orders:  -     Lipid Panel  -     TSH  -     Urinalysis, Reflex to Urine Culture Urine, Clean Catch    4. Postmenopausal  Assessment & Plan:  Declines dexa. Last 6/2018 at [facility] showed osteopenia. On calcium and vit d. Encourage weight bearing exercises    Orders:  -     Lipid Panel  -     TSH  -     Urinalysis, Reflex to Urine Culture Urine, Clean Catch    5. Leukopenia, unspecified type  Assessment & Plan:  Keep appointments with heme/onc    Orders:  -     Lipid Panel  -     TSH  -     Urinalysis, Reflex to Urine Culture Urine, Clean Catch    6. Neutropenia, unspecified type  -     Lipid Panel  -     TSH  -     Urinalysis, Reflex to Urine Culture Urine, Clean Catch    7. Hypothyroidism, unspecified type  Assessment & Plan:  Stable on synthroid  Lab Results   Component Value Date    TSH 1.0236 08/09/2021         Orders:  -     Lipid Panel  -      TSH  -     Urinalysis, Reflex to Urine Culture Urine, Clean Catch    8. Constipation, unspecified constipation type  Assessment & Plan:  Using prunes and metamucil.     Orders:  -     Lipid Panel  -     TSH  -     Urinalysis, Reflex to Urine Culture Urine, Clean Catch    9. Coronary artery disease, unspecified vessel or lesion type, unspecified whether angina present, unspecified whether native or transplanted heart  Assessment & Plan:  Stable on current meds. Keep appointments with cards    Orders:  -     Lipid Panel  -     TSH  -     Urinalysis, Reflex to Urine Culture Urine, Clean Catch    10. Hypertension, unspecified type  Assessment & Plan:  Well controlled on current meds. On asa. Keep appointments with dr. bustamante    Orders:  -     Lipid Panel  -     TSH  -     Urinalysis, Reflex to Urine Culture Urine, Clean Catch    11. Hyperlipidemia, unspecified hyperlipidemia type  Assessment & Plan:  On statin. Keep appointments with cards    Orders:  -     Lipid Panel  -     TSH  -     Urinalysis, Reflex to Urine Culture Urine, Clean Catch    12. Mobility impaired  Assessment & Plan:  Has life alert. Uses rolling walker. Has handicap tag.     Orders:  -     Lipid Panel  -     TSH  -     Urinalysis, Reflex to Urine Culture Urine, Clean Catch       Follow up in about 1 year (around 7/20/2023) for wellness with labs.

## 2022-07-20 NOTE — PROGRESS NOTES
"These outside lab results have been reviewed by your healthcare team.  You are advised to continue your current medications.    Labs are within normal limits except  >TG are slightly elevated. Watch diet  >urine showed a small amount of bacteria.  Could possibly be contaminant.  Is she having any uti symptoms? If yes, will need to collect another urine.     Please keep in mind that results may often be outside of the "normal" range while still being acceptable for your diagnosis and your plan of care.  You will be contacted if your results require a change in your medical treatment. If you wish to discuss your results, you will be required to schedule an appointment.  "

## 2022-07-22 PROBLEM — D46.C MDS (MYELODYSPLASTIC SYNDROME) WITH 5Q DELETION: Status: ACTIVE | Noted: 2022-01-01

## 2022-07-26 NOTE — PROGRESS NOTES
"These results have been reviewed by your healthcare team.  You are advised to continue your current medications.    Patient is very anemic. When is her next appointment with dr. Jeffries?  She may need a blood transfusion if she is symptomatic.  Has she had one since these labs?        Please keep in mind that results may often be outside of the "normal" range while still being acceptable for your diagnosis and your plan of care.  You will be contacted if your results require a change in your medical treatment. If you wish to discuss your results, you will be required to schedule an appointment.  "

## 2022-07-29 PROBLEM — Z74.09 MOBILITY IMPAIRED: Status: ACTIVE | Noted: 2022-01-01

## 2022-07-29 NOTE — ASSESSMENT & PLAN NOTE
Declines dexa. Last 6/2018 at [facility] showed osteopenia. On calcium and vit d. Encourage weight bearing exercises

## 2022-07-29 NOTE — ASSESSMENT & PLAN NOTE
Fasting labs ordered. Will call with results when available  Declines dexa  Declines immunizations    Advanced care planning discussed and paperwork given

## 2022-08-01 NOTE — PLAN OF CARE
Aranesp given,  tolerated well. Return in 2 weeks  she is aware of plan of care.  Discharged to home

## 2022-08-03 NOTE — PROGRESS NOTES
Subjective:       Patient ID: Edith Alvarado is a 89 y.o. female.    Chief Complaint: Follow-up (5 week follow up for Bone marrow biopsy )      Diagnosis:  1. MDS with 5q deletion, low risk per IPSS-R    Current Treatment:   1. Aranesp 300 mcg every 2 weeks PRN started 8/1/2022.  2.  Blood transfusions PRN    Treatment History:  1.  See above    Follow-up  Associated symptoms include fatigue. Pertinent negatives include no abdominal pain, arthralgias, chest pain, chills, congestion, coughing, diaphoresis, fever, headaches, numbness, rash, sore throat or weakness.      HPI:  Patient who presented to see me for leukopenia/neutropenia, and macrocytic anemia.  Her anemia dates back to 8/31/2013.  Her macrocytosis started on 8/10/2017, her anemia persisted and she became leukopenic on 6/18/2018.  Her B12 on 4/26/2019 was normal at 476, on 7/1/2019 was 963, and on 7/2/2020 was 1012.  She was referred to hematology for further evaluation.  She presented to see me initially on 8/11/2020.  At that visit, she had vision changes, decreased hearing, dental problems, swelling in her legs, constipation, dark stools, hemorrhoids, urinary frequency/urgency, and some easy bruising.  She denied any fatigue, weight loss, fever, chills, night sweats, chest pain, shortness of breath, cough, trouble swallowing, abdominal pain, nausea, vomiting, diarrhea, hematuria, dysuria, numbness/tingling.  Work-up on that day revealed normal folic acid, ceruloplasmin, and copper.  Her hemoglobin on that day was 9.6.  At that visit, we discussed bone marrow biopsy versus observation, plan was for observation every 3 months.  Patient did not follow up with me after her initial visit.  She was following with her PCP.  Lab work done on 8/9/2021 that revealed a hemoglobin of 7.5, she was admitted to the emergency department, underwent a blood transfusion of 2 units of packed red blood cells, follow-up CBC on 8/23/2021 showed increase in hemoglobin  up to 10.5.  She represented to see me on 9/14/2021,  hemoglobin on that day was 9.6. Bone marrow biopsy done on 7/5/2022 showed MDS with 5q deletion.  No increased blast.  Per IPSS-R, patient is low risk.  Started erythropoietin stimulating agent on 08/01/2022.    Interval History:   Patient presents to clinic for a follow up appointment to review bone marrow biopsy results.  She is feeling pretty good today and her fatigue has improved with aranesp.       Past Medical History:   Diagnosis Date    Anemia     CAD (coronary artery disease)     HLD (hyperlipidemia)     Hypertension     Leukopenia     Macrocytic anemia     MDS (myelodysplastic syndrome) with 5q deletion     Neutropenia       Past Surgical History:   Procedure Laterality Date    APPENDECTOMY      BLADDER SUSPENSION      BONE MARROW ASPIRATION N/A 7/5/2022    Procedure: ASPIRATION, BONE MARROW;  Surgeon: Reinier Vences MD;  Location: [facility];  Service: General;  Laterality: N/A;    BONE MARROW BIOPSY  07/05/2022    cataract surgery      CHOLECYSTECTOMY      COLONOSCOPY      CORONARY STENT PLACEMENT      HYSTERECTOMY       Social History     Socioeconomic History    Marital status:    Tobacco Use    Smoking status: Never Smoker    Smokeless tobacco: Never Used   Substance and Sexual Activity    Alcohol use: Not Currently    Drug use: Never      Family History   Family history unknown: Yes      Review of patient's allergies indicates:   Allergen Reactions    Lortab [hydrocodone-acetaminophen] Hives    Codeine Rash    Ranexa [ranolazine]       Review of Systems   Constitutional: Positive for fatigue. Negative for appetite change, chills, diaphoresis, fever and unexpected weight change.   HENT: Negative for nasal congestion, mouth sores, sinus pressure/congestion and sore throat.    Eyes: Negative for pain and visual disturbance.   Respiratory: Positive for shortness of breath. Negative for cough and chest tightness.     Cardiovascular: Negative for chest pain, palpitations and leg swelling.   Gastrointestinal: Negative for abdominal distention, abdominal pain, blood in stool, constipation and diarrhea.   Genitourinary: Negative for dysuria, frequency and hematuria.   Musculoskeletal: Negative for arthralgias and back pain.   Integumentary:  Negative for rash.   Neurological: Negative for dizziness, weakness, numbness and headaches.   Hematological: Negative for adenopathy.   Psychiatric/Behavioral: Negative for confusion. The patient is not nervous/anxious.          Objective:      Physical Exam  Vitals reviewed.   Constitutional:       General: She is awake. She is not in acute distress.     Appearance: Normal appearance. She is well-groomed.   HENT:      Head: Normocephalic.      Right Ear: Decreased hearing noted.      Left Ear: Decreased hearing noted.   Eyes:      General: No scleral icterus.     Extraocular Movements: Extraocular movements intact.   Pulmonary:      Effort: Pulmonary effort is normal.   Abdominal:      General: There is no distension.   Musculoskeletal:         General: No swelling, tenderness or deformity.      Cervical back: Full passive range of motion without pain and neck supple.      Right lower leg: Edema present.      Left lower leg: Edema present.   Skin:     General: Skin is warm and dry.      Coloration: Skin is pale. Skin is not jaundiced.   Neurological:      General: No focal deficit present.      Mental Status: She is alert and oriented to person, place, and time.      Motor: Weakness present.      Gait: Gait is intact.   Psychiatric:         Mood and Affect: Mood normal.         Speech: Speech normal.         Behavior: Behavior normal. Behavior is cooperative.         LABS REVIEWED IN [facility]          Assessment:     1. MDS with 5q deletion, low risk per IPSS-R      Plan:         At this point, the patient has had a chronic macrocytic anemia and a chronic leukopenia.  These have worsened  recently.  Her transfusion requirements are getting closer to each other.    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  Originally the patient was skeptical about bone marrow biopsy, eventually agreed.  She does have MDS with a 5q deletion, would qualify for Revlimid.  She is on erythropoietin stimulating agents, started on 08/01/2022.    Continue MEHUL for now, if she develops symptomatic anemia requiring blood transfusions again, we will consider Revlimid.    Continue CBC and Aranesp PRN every 2 weeks    CBC today    Return to clinic in 5 weeks with repeat CBC and CMP      All of their questions were answered to the best of my ability.  They know to call if she has any worsening symptoms before her return visit.    Jeremiah Jeffries II, MD I, Mara Matamoros LPN, acted solely as a scribe for and in the presence of, Dr. Jeremiah Jeffries who performed the service.

## 2022-08-26 NOTE — TELEPHONE ENCOUNTER
I have signed for the following orders AND/OR meds.  Please call the patient and ask the patient to schedule the testing AND/OR inform about any medications that were sent.      Orders Placed This Encounter   Procedures    Ambulatory referral/consult to Urology     Standing Status:   Future     Standing Expiration Date:   9/26/2023     Referral Priority:   Routine     Referral Type:   Consultation     Referral Reason:   Specialty Services Required     Referred to Provider:   Shira Phillips MD     Requested Specialty:   Urology     Number of Visits Requested:   1

## 2022-08-26 NOTE — TELEPHONE ENCOUNTER
----- Message from Nicolle De Souza sent at 8/26/2022  9:09 AM CDT -----  Regarding: Referral  .Type:  Needs Medical Advice    Who Called: Pt's Daughter Edith  Symptoms (please be specific): Bladder issues   How long has patient had these symptoms:    Pharmacy name and phone #:    Would the patient rather a call back or a response via [facility]? Call back  Best Call Back Number: [phone]  Additional Information: Requesting a referral to urologist..

## 2022-08-31 PROBLEM — N39.0 UTI (URINARY TRACT INFECTION): Status: ACTIVE | Noted: 2022-01-01

## 2022-08-31 PROBLEM — R42 DIZZINESS: Status: ACTIVE | Noted: 2022-01-01

## 2022-08-31 NOTE — H&P
[facility] EMERGENCY DEPARTMENT    Hospital Medicine History & Physical Examination       Patient Name: Edith Alvarado  MRN: [id number]  Patient Class: IP- Inpatient   Admission Date: 8/31/2022   Admitting Physician: ZUHAIR Service   Length of Stay: 0  Attending Physician: Francisco Fierro MD  Primary Care Provider: Tiffany Cameron MD  Face-to-Face encounter date: 08/31/2022    Code Status: DNR    Chief Complaint: Dizziness          HISTORY OF PRESENT ILLNESS:   Edith Alvarado is a 89 y.o. female who  has a past medical history of Anemia, CAD (coronary artery disease), HLD (hyperlipidemia), Hypertension, Leukopenia, Macrocytic anemia, MDS (myelodysplastic syndrome) with 5q deletion, and Neutropenia.. The patient presented to [facility] on 8/31/2022 with a primary complaint of dizziness.  Pt complaint of lower abdominal/pelvic pain on/off x 2wks with associated incomplete emptying x 4days, chronic urinary frequency.  Woke up this am with dizziness lasting about 30min.  ED work up showing UTI, ct head hypodensity left cerebellum. No f/c, no change in bowel habits, no headache, syncope, no change in appetite, chronic fatique, sob associated with Hx of MDS with frequent blood transfusions in past now treated with medication.     PAST MEDICAL HISTORY:     Past Medical History:   Diagnosis Date    Anemia     CAD (coronary artery disease)     HLD (hyperlipidemia)     Hypertension     Leukopenia     Macrocytic anemia     MDS (myelodysplastic syndrome) with 5q deletion     Neutropenia        PAST SURGICAL HISTORY:     Past Surgical History:   Procedure Laterality Date    APPENDECTOMY      BLADDER SUSPENSION      BONE MARROW ASPIRATION N/A 7/5/2022    Procedure: ASPIRATION, BONE MARROW;  Surgeon: Reinier Vences MD;  Location: [facility];  Service: General;  Laterality: N/A;    BONE MARROW BIOPSY  07/05/2022    cataract surgery      CHOLECYSTECTOMY      COLONOSCOPY      CORONARY STENT PLACEMENT       HYSTERECTOMY         ALLERGIES:   Lortab [hydrocodone-acetaminophen], Codeine, and Ranexa [ranolazine]    FAMILY HISTORY:   Family history is unknown by patient.    SOCIAL HISTORY:     Social History     Tobacco Use    Smoking status: Never    Smokeless tobacco: Never   Substance Use Topics    Alcohol use: Not Currently        HOME MEDICATIONS:     Prior to Admission medications    Medication Sig Start Date End Date Taking? Authorizing Provider   aspirin (ECOTRIN) 81 MG EC tablet Take 81 mg by mouth once daily.    Historical Provider   calcium carbonate (TUMS) 200 mg calcium (500 mg) chewable tablet Take 1 tablet by mouth once daily.    Historical Provider   carvediloL (COREG) 6.25 MG tablet Take 6.25 mg by mouth 2 (two) times daily.    Historical Provider   ergocalciferol (ERGOCALCIFEROL) 50,000 unit Cap Take 5,000 Units by mouth nightly.    Historical Provider   isosorbide mononitrate (IMDUR) 60 MG 24 hr tablet Take 60 mg by mouth once daily.    Historical Provider   levothyroxine (SYNTHROID) 88 MCG tablet Take 1 tablet (88 mcg total) by mouth once daily. 5/31/22 6/30/22  ERIN Pate   losartan (COZAAR) 100 MG tablet Take 100 mg by mouth once daily.    Historical Provider   pravastatin (PRAVACHOL) 20 MG tablet Take 20 mg by mouth once daily.    Historical Provider       REVIEW OF SYSTEMS:   Except as documented, all other systems reviewed and negative   ROS      PHYSICAL EXAM:     VITAL SIGNS: 24 HRS MIN & MAX LAST   Temp  Min: 98.1 °F (36.7 °C)  Max: 98.6 °F (37 °C) 98.1 °F (36.7 °C)   BP  Min: 109/43  Max: 122/54 (!) 122/54     Pulse  Min: 61  Max: 84  70   Resp  Min: 18  Max: 20 18   SpO2  Min: 99 %  Max: 99 % 99 %       General appearance: Elderly female in no apparent distress.  HENT: Atraumatic head. Moist mucous membranes of oral cavity.  Eyes: Normal extraocular movements.   Neck: Supple.   Lungs: Clear to auscultation bilaterally. No wheezing present.   Heart: Regular rate and rhythm. S1  and S2 present MARY III/VI  Abdomen: Soft, non-distended, non-tender. No rebound tenderness/guarding. Bowel sounds are normal.   Extremities: No cyanosis, clubbing, or edema.  Skin: No Rash.   Neuro: no focal deficits. Muscle strength 5/5 in all 4 extremities  Psych/mental status: Appropriate mood and affect. Responds appropriately to questions.     LABS AND IMAGING:     Recent Labs   Lab 08/29/22  0847 08/31/22  1013   WBC 4.7 2.5*   RBC 2.16* 2.04*   HGB 7.6* 7.3*   HCT 24.4* 22.5*   .0* 110.3*   MCH 35.2* 35.8*   MCHC 31.1* 32.4*   RDW 18.7* 19.2*    254   MPV 12.5* 13.3*       Recent Labs   Lab 08/31/22  1013      K 4.1   CO2 24   BUN 22.6*   CREATININE 0.84   CALCIUM 9.3   ALBUMIN 3.7   ALKPHOS 76   ALT 11   AST 14   BILITOT 0.5       Microbiology Results (last 7 days)       Procedure Component Value Units Date/Time    Urine culture [[id number]] Collected: 08/31/22 0957    Order Status: Sent Specimen: Urine Updated: 08/31/22 1026             CT Abdomen Pelvis With Contrast  Narrative: EXAMINATION:  CT ABDOMEN PELVIS WITH CONTRAST    CLINICAL HISTORY:  Abdominal pain, acute, nonlocalized;IF GFR OK;    TECHNIQUE:  Low dose axial images, sagittal and coronal reformations were obtained from the lung bases to the pubic symphysis following the IV administration of contrast. Automatic exposure control (AEC) is utilized to reduce patient radiation exposure.    COMPARISON:  None.    FINDINGS:  The lung bases are clear.  There is some chronic appearing interstitial lung changes in the lung bases bilaterally    The liver is slightly enlarged in size .  No liver mass or lesion is seen.  Portal and hepatic veins appear normal.    The patient is status post cholecystectomy.    The pancreas appears normal.  No pancreatic mass or lesion is seen.    The spleen shows no acute abnormality.    The adrenal glands appear normal.  No adrenal nodule is seen.    There is evidence of right-sided hydroureter with no  significant hydronephrosis seen..  No nephro or ureterolithiasis is seen.  The ureter is dilated down to level of the urinary bladder and the urinary bladder has inflammatory change associated with it.  No obvious bladder mass or lesion is seen.  No bladder stone is seen.    The left kidney appears grossly unremarkable with no evidence of hydronephrosis or hydroureter.    There is a small inguinal hernia on the left side containing some fat.    No colitis is seen.  No diverticulitis is seen.  No obvious colonic mass or lesion is seen.    No free air is seen.  No free fluid is seen.  Impression: Findings consistent with right-sided hydroureter with no associated hydronephrosis..  No ureterolithiasis is seen and no obvious renal or ureteral mass is seen but there is some inflammatory changes in the urinary bladder which may be causing the ureteral dilatation.  Follow-up is recommended    Mild hepatomegaly    Chronic lung changes in the lung bases bilaterally    Small inguinal hernia on the left side containing some fat    Electronically signed by: Margot Reilly  Date:    08/31/2022  Time:    11:43  CT Head Without Contrast  Narrative: EXAMINATION:  CT HEAD WITHOUT CONTRAST    CLINICAL HISTORY:  Dizziness, persistent/recurrent, cardiac or vascular cause suspected;    TECHNIQUE:  Axial scans were obtained from skull base to the vertex.    Coronal and sagittal reconstructions obtained from the axial data.    Automatic exposure control was utilized to limit radiation dose.    Contrast: None    Radiation Dose:    Total DLP: 750 mGy*cm    COMPARISON:  None    FINDINGS:  There is no acute intracranial hemorrhage or edema.  There is a linear hypodensity in the left cerebellum which is age indeterminate (series 6, image 28).  Scattered hypodensities in the subcortical and periventricular white matter likely represent chronic microvascular ischemic changes    There is no mass effect or midline shift.  There is diffuse  parenchymal loss.  The basal cisterns are patent. There is no abnormal extra-axial fluid collection.  Carotid and vertebral artery calcifications are noted.    The calvarium and skull base are intact. The visualized paranasal sinuses and the mastoid air cells are clear.  Impression: 1. Small hypodensity in the left cerebellum is age indeterminate and may represent recent ischemic changes.  MRI is available for confirmation.  2. Chronic microvascular ischemic changes.    Electronically signed by: Maddie Lux  Date:    08/31/2022  Time:    11:39        __________________________________________________________________________  INPATIENT LIST OF MEDICATIONS     Scheduled Meds:   [START ON 9/1/2022] aspirin  81 mg Oral Daily    [START ON 9/1/2022] calcium carbonate  1 tablet Oral Daily    carvediloL  6.25 mg Oral BID    [START ON 9/1/2022] cefTRIAXone (ROCEPHIN) IVPB  1 g Intravenous Q24H    enoxaparin  30 mg Subcutaneous Daily    famotidine  20 mg Oral Daily    [START ON 9/1/2022] isosorbide mononitrate  60 mg Oral Daily    [START ON 9/1/2022] levothyroxine  88 mcg Oral Daily    [START ON 9/1/2022] losartan  100 mg Oral Daily    [START ON 9/1/2022] pravastatin  20 mg Oral Daily     Continuous Infusions:  PRN Meds:melatonin, ondansetron, sodium chloride 0.9%          ASSESSMENT & PLAN:   Dizziness/TIA  UTI  MDS/Anemia    Plan    Mri head  Rocephin-f/u cxs  Consider PRBC  1l ivf/Labs in am    Gi proph: pepcid  Dvt proph: shila Fierro MD   08/31/2022

## 2022-08-31 NOTE — ED TRIAGE NOTES
C/o dizziness since last pm. Denies any recent trauma. Low abd pain for past 2 weeks. Awaiting urology apt.

## 2022-08-31 NOTE — ED PROVIDER NOTES
"Encounter Date: 8/31/2022       History     Chief Complaint   Patient presents with    Dizziness     PATIENT IS A 89-YEAR-OLD FEMALE WITH A HISTORY OF MYELODYSPLASTIC SYNDROME, HYPERTENSION, AND THYROID DISEASE WHO COMES TO THE EMERGENCY ROOM COMPLAINING OF A 2 WEEK HISTORY OF INTERMITTENT LOWER ABDOMINAL PAIN.  She states when she got up this morning she felt "dizzy".  She said she was having trouble with her balance.  She had no visual changes nor focal weakness tingling or numbness.  The dizziness has resolved.  She denies vertigo, new hearing problems, ringing in the ears, fevers chills sweats and headache.  She is on a new medication to "help her make red blood cells"    Review of patient's allergies indicates:   Allergen Reactions    Lortab [hydrocodone-acetaminophen] Hives    Codeine Rash    Ranexa [ranolazine] Other (See Comments)     Leg Cramps     Past Medical History:   Diagnosis Date    Anemia     CAD (coronary artery disease)     HLD (hyperlipidemia)     Hypertension     Leukopenia     Macrocytic anemia     MDS (myelodysplastic syndrome) with 5q deletion     Neutropenia      Past Surgical History:   Procedure Laterality Date    APPENDECTOMY      BLADDER SUSPENSION      BONE MARROW ASPIRATION N/A 7/5/2022    Procedure: ASPIRATION, BONE MARROW;  Surgeon: Reinier Vences MD;  Location: [facility];  Service: General;  Laterality: N/A;    BONE MARROW BIOPSY  07/05/2022    cataract surgery      CHOLECYSTECTOMY      COLONOSCOPY      CORONARY STENT PLACEMENT      HYSTERECTOMY       Family History   Family history unknown: Yes     Social History     Tobacco Use    Smoking status: Never    Smokeless tobacco: Never   Substance Use Topics    Alcohol use: Not Currently    Drug use: Never     Review of Systems    Physical Exam     Initial Vitals [08/31/22 0950]   BP Pulse Resp Temp SpO2   (!) 120/57 84 20 98.4 °F (36.9 °C) 99 %      MAP       --         Physical Exam    Constitutional: She " appears well-developed. No distress.   HENT:   Head: Normocephalic and atraumatic.   Mouth/Throat: Oropharynx is clear and moist.   Ear exam is normal bilaterally with clear canals and normal tympanic membranes   Eyes: Conjunctivae are normal.   Neck: Neck supple.   Normal range of motion.  Cardiovascular:  Normal rate, regular rhythm and normal heart sounds.     Exam reveals no gallop and no friction rub.       No murmur heard.  There is a bruit in the right and left carotid artery and in both flanks and over the central abdomen.   Pulmonary/Chest: Breath sounds normal. No respiratory distress. She has no wheezes. She has no rhonchi. She has no rales.   Abdominal: Abdomen is soft. Bowel sounds are normal. She exhibits no distension. abdominal tenderness   There is mild tenderness to the lower end abdomen diffusely.  No guarding no rebound.  There is no palpable mass nor lymphadenopathy.  No hepatosplenomegaly There is no guarding.   Musculoskeletal:         General: No edema.      Cervical back: Normal range of motion and neck supple.     Lymphadenopathy:     She has no cervical adenopathy.   Neurological: She is alert and oriented to person, place, and time. She has normal strength. No cranial nerve deficit or sensory deficit. She displays a negative Romberg sign. Coordination and gait normal. GCS eye subscore is 4. GCS verbal subscore is 5. GCS motor subscore is 6.   Finger to nose, heel to shin, and alternating hand collapse are normal   Skin: Skin is warm.   Psychiatric: She has a normal mood and affect.       ED Course   Procedures  Labs Reviewed   COMPREHENSIVE METABOLIC PANEL - Abnormal; Notable for the following components:       Result Value    Blood Urea Nitrogen 22.6 (*)     All other components within normal limits   URINALYSIS, REFLEX TO URINE CULTURE - Abnormal; Notable for the following components:    Color, UA Straw (*)     Appearance, UA SL CLOUDY (*)     Nitrites, UA Positive (*)     Leukocyte  Esterase, UA Moderate (*)     All other components within normal limits   CBC WITH DIFFERENTIAL - Abnormal; Notable for the following components:    WBC 2.5 (*)     RBC 2.04 (*)     Hgb 7.3 (*)     Hct 22.5 (*)     .3 (*)     MCH 35.8 (*)     MCHC 32.4 (*)     RDW 19.2 (*)     MPV 13.3 (*)     IPF 14.5 (*)     All other components within normal limits   URINALYSIS, MICROSCOPIC - Abnormal; Notable for the following components:    Bacteria, UA Many (*)     WBC, UA 11-20 (*)     Squamous Epithelial Cells, UA Few (*)     All other components within normal limits   MANUAL DIFFERENTIAL - Abnormal; Notable for the following components:    Neut Man 42 (*)     Lymph Man 48 (*)     Abs Neut calc 1.1 (*)     RBC Morph Abnormal (*)     Anisocyte 2+ (*)     Macrocyte 2+ (*)     All other components within normal limits   SARS-COV-2 RNA AMPLIFICATION, QUAL - Normal   LIPASE - Normal   TSH - Normal   TROPONIN I - Normal   CULTURE, URINE   CBC W/ AUTO DIFFERENTIAL    Narrative:     The following orders were created for panel order CBC auto differential.  Procedure                               Abnormality         Status                     ---------                               -----------         ------                     CBC with Differential[[id number]]        Abnormal            Final result               Manual Differential[[id number]]          Abnormal            Final result                 Please view results for these tests on the individual orders.     EKG Readings: (Independently Interpreted)   EKG time 946 rhythm normal sinus rhythm rate 80 axis left left bundle-branch block abnormal EKG   ECG Results              EKG 12-lead (In process)  Result time 08/31/22 11:45:22      In process by Interface, Lab In [facility] (08/31/22 11:45:22)                   Narrative:    Test Reason : R42,    Vent. Rate : 080 BPM     Atrial Rate : 080 BPM     P-R Int : 188 ms          QRS Dur : 144 ms      QT Int : 398 ms       P-R-T  Axes : 064 -57 114 degrees     QTc Int : 459 ms    Normal sinus rhythm  Left axis deviation  Left bundle branch block  Abnormal ECG  No previous ECGs available    Referred By: [email or web address]   SELF           Confirmed By:                                   Imaging Results              CT Head Without Contrast (Final result)  Result time 08/31/22 11:39:42      Final result by Maddie Lux MD (08/31/22 11:39:42)                   Impression:      1. Small hypodensity in the left cerebellum is age indeterminate and may represent recent ischemic changes.  MRI is available for confirmation.  2. Chronic microvascular ischemic changes.      Electronically signed by: Maddie Lux  Date:    08/31/2022  Time:    11:39               Narrative:    EXAMINATION:  CT HEAD WITHOUT CONTRAST    CLINICAL HISTORY:  Dizziness, persistent/recurrent, cardiac or vascular cause suspected;    TECHNIQUE:  Axial scans were obtained from skull base to the vertex.    Coronal and sagittal reconstructions obtained from the axial data.    Automatic exposure control was utilized to limit radiation dose.    Contrast: None    Radiation Dose:    Total DLP: 750 mGy*cm    COMPARISON:  None    FINDINGS:  There is no acute intracranial hemorrhage or edema.  There is a linear hypodensity in the left cerebellum which is age indeterminate (series 6, image 28).  Scattered hypodensities in the subcortical and periventricular white matter likely represent chronic microvascular ischemic changes    There is no mass effect or midline shift.  There is diffuse parenchymal loss.  The basal cisterns are patent. There is no abnormal extra-axial fluid collection.  Carotid and vertebral artery calcifications are noted.    The calvarium and skull base are intact. The visualized paranasal sinuses and the mastoid air cells are clear.                                       CT Abdomen Pelvis With Contrast (Final result)  Result time 08/31/22 11:43:46      Final result by  Margot Reilly MD (08/31/22 11:43:46)                   Impression:      Findings consistent with right-sided hydroureter with no associated hydronephrosis..  No ureterolithiasis is seen and no obvious renal or ureteral mass is seen but there is some inflammatory changes in the urinary bladder which may be causing the ureteral dilatation.  Follow-up is recommended    Mild hepatomegaly    Chronic lung changes in the lung bases bilaterally    Small inguinal hernia on the left side containing some fat      Electronically signed by: Margot Reilly  Date:    08/31/2022  Time:    11:43               Narrative:    EXAMINATION:  CT ABDOMEN PELVIS WITH CONTRAST    CLINICAL HISTORY:  Abdominal pain, acute, nonlocalized;IF GFR OK;    TECHNIQUE:  Low dose axial images, sagittal and coronal reformations were obtained from the lung bases to the pubic symphysis following the IV administration of contrast. Automatic exposure control (AEC) is utilized to reduce patient radiation exposure.    COMPARISON:  None.    FINDINGS:  The lung bases are clear.  There is some chronic appearing interstitial lung changes in the lung bases bilaterally    The liver is slightly enlarged in size .  No liver mass or lesion is seen.  Portal and hepatic veins appear normal.    The patient is status post cholecystectomy.    The pancreas appears normal.  No pancreatic mass or lesion is seen.    The spleen shows no acute abnormality.    The adrenal glands appear normal.  No adrenal nodule is seen.    There is evidence of right-sided hydroureter with no significant hydronephrosis seen..  No nephro or ureterolithiasis is seen.  The ureter is dilated down to level of the urinary bladder and the urinary bladder has inflammatory change associated with it.  No obvious bladder mass or lesion is seen.  No bladder stone is seen.    The left kidney appears grossly unremarkable with no evidence of hydronephrosis or hydroureter.    There is a small  inguinal hernia on the left side containing some fat.    No colitis is seen.  No diverticulitis is seen.  No obvious colonic mass or lesion is seen.    No free air is seen.  No free fluid is seen.                                       Medications   cefTRIAXone (ROCEPHIN) 1 g in dextrose 5 % in water (D5W) 5 % 50 mL IVPB (MB+) (has no administration in time range)   iopamidoL (ISOVUE-370) injection 100 mL (100 mLs Intravenous Given 8/31/22 1128)     Medical Decision Making:   Differential Diagnosis:   TIA, Meniere's disease, schwannoma, CNS mass, vertigo, abdominal aortic aneurysm, bowel ischemia, mesenteric ischemia, diverticulitis, UTI, bladder cancer  Other:   I have discussed this case with another health care provider.       <> Summary of the Discussion: Hospitalist called for Dr. Tiffany Dietz discussed case with me in great detail and recs admit to Dr. Lin.  No further recs  I discussed results of testing and need for admission to the hospital.  Patient and family verbalized understanding.  All questions were answered                    Clinical Impression:   Final diagnoses:  [R42] Dizziness               Nitesh Dumont Jr., MD  08/31/22 2202

## 2022-09-01 NOTE — PROGRESS NOTES
[facility] Hospital Medicine Progress Note        Chief Complaint: Dizziness    HPI:   89 y.o. female who  has a past medical history of Anemia, CAD (coronary artery disease), HLD (hyperlipidemia), Hypertension, Leukopenia, Macrocytic anemia, MDS (myelodysplastic syndrome) with 5q deletion, and Neutropenia.  The patient presented to [facility] on 8/31/2022 with a primary complaint of dizziness.  Pt complaint of lower abdominal/pelvic pain on/off x 2wks with associated incomplete emptying x 4days, chronic urinary frequency.  Woke up this am with dizziness lasting about 30min.  ED work up showing UTI, ct head hypodensity left cerebellum. No f/c, no change in bowel habits, no headache, syncope, no change in appetite, chronic fatique, sob associated with Hx of MDS with frequent blood transfusions in past now treated with medication.     Interval Hx:   Just returned from MRI; daughter present at bedside.  Patient plans to return home with family; still weak butslightly improved.    Objective/physical exam:  General: Appears comfortable, no acute distress.  Integumentary: Warm, dry, intact.  Musculoskeletal: Purposeful movement noted.     Respiratory: No accessory muscle use. Breath sounds are equal.  Cardiovascular: Regular rate. No peripheral edema.    VITAL SIGNS: 24 HRS MIN & MAX LAST   Temp  Min: 97.6 °F (36.4 °C)  Max: 98.3 °F (36.8 °C) 98.3 °F (36.8 °C)   BP  Min: 100/57  Max: 136/55 (!) 117/59     Pulse  Min: 44  Max: 75  75   Resp  Min: 17  Max: 18 18   SpO2  Min: 96 %  Max: 100 % 99 %     Echo  · There is no evidence of intracardiac shunting.  · Mild concentric hypertrophy and normal systolic function.  · The estimated ejection fraction is 56%.  · Grade I left ventricular diastolic dysfunction.  · Normal right ventricular size with normal right ventricular systolic   function.  · Moderate-severe aortic stenosis. Peak AV velocity 3.6 m/sec, MG 28mmHg,   ANMA 0.84 cm2. Dimensionless index  0.26.  · Mild-to-moderate aortic regurgitation.  · Mild mitral regurgitation.  · Mild tricuspid regurgitation.  · The estimated PA systolic pressure is 33 mmHg.       Recent Labs   Lab 08/29/22  0847 08/31/22  1013 09/01/22  0336   WBC 4.7 2.5* 3.4*   RBC 2.16* 2.04* 1.87*   HGB 7.6* 7.3* 6.7*   HCT 24.4* 22.5* 22.0*   .0* 110.3* 117.6*   MCH 35.2* 35.8* 35.8*   MCHC 31.1* 32.4* 30.5*   RDW 18.7* 19.2* 19.5*    254 217   MPV 12.5* 13.3* 13.7*       Recent Labs   Lab 08/31/22  1013 09/01/22  0336    139   K 4.1 4.2   CO2 24 23   BUN 22.6* 23.9*   CREATININE 0.84 0.79   CALCIUM 9.3 8.7   ALBUMIN 3.7  --    ALKPHOS 76  --    ALT 11  --    AST 14  --    BILITOT 0.5  --           Microbiology Results (last 7 days)       Procedure Component Value Units Date/Time    Urine culture [[id number]]  (Abnormal) Collected: 08/31/22 0957    Order Status: Completed Specimen: Urine Updated: 09/01/22 0748     Urine Culture >/= 100,000 colonies/ml Gram-negative Rods             See below for Radiology    Scheduled Med:   aspirin  81 mg Oral Daily    calcium carbonate  1 tablet Oral Daily    carvediloL  6.25 mg Oral BID    cefTRIAXone (ROCEPHIN) IVPB  1 g Intravenous Q24H    enoxaparin  30 mg Subcutaneous Daily    famotidine  20 mg Oral Daily    isosorbide mononitrate  60 mg Oral Daily    levothyroxine  88 mcg Oral Daily    losartan  100 mg Oral Daily    pravastatin  20 mg Oral Daily        Continuous Infusions:       PRN Meds:  melatonin, ondansetron, sodium chloride 0.9%     Nutrition Status:  As tolerated    Assessment/Plan:  Dizziness   Hypodensity of left cerebellum  Urinary tract infection  MDS   Anemia    Plan  Presents for dizziness, CT of the head shows a small hypodensity left cerebellum at age indeterminate and may represent recent ischemic changes.  Continue medical optimization with aspirin, statin therapy.  MRI of the brain pending.    UTI-continue Rocephin for now, follow-up on urine  speciation/sensitivity results.      Anemic--likely related to MDS, after reviewing recent hematology documentation 07/2022 patient and family did not want to pursue any further bone marrow biopsy and would prefer blood transfusion is needed.  Will transfuse 2 units packed red blood cells and follow up appropriately.  Vitamin B12 and folic acid was noted to be normal in 2019, will recheck .    Followup on MRI results    This is a critical care document  Critical Care Diagnosis:acute anemia requiring blood transfusion.     Critical care interventions: hands on evaluation, review of labs/radiographs/records and discussions; assessing and managing the high probability of imminent or life-threatening deterioration of cardiorespiratory status.  Critical care time spent > 40 minutes.       Anticipated discharge and Disposition:      All diagnosis and differential diagnosis have been reviewed; assessment and plan has been documented; I have personally reviewed the labs and test results that are presently available; I have reviewed the patients medication list; I have reviewed the consulting providers response and recommendations. I have reviewed or attempted to review medical records based upon their availability    All of the patient's questions have been  addressed and answered. Patient's is agreeable to the above stated plan.   I will continue to monitor closely and make adjustments to medical management as needed.      Julianne aHq, DO   09/01/2022        This note was created with the assistance of Dragon voice recognition software. There may be transcription errors as a result of using this technology however minimal. Effort has been made to assure accuracy of transcription but any obvious errors or omissions should be clarified with the author of the document.

## 2022-09-02 NOTE — PROGRESS NOTES
[facility] Hospital Medicine Progress Note        Chief Complaint: Dizziness    HPI:   89 y.o. female who  has a past medical history of Anemia, CAD (coronary artery disease), HLD (hyperlipidemia), Hypertension, Leukopenia, Macrocytic anemia, MDS (myelodysplastic syndrome) with 5q deletion, and Neutropenia.  The patient presented to [facility] on 8/31/2022 with a primary complaint of dizziness.  Pt complaint of lower abdominal/pelvic pain on/off x 2wks with associated incomplete emptying x 4days, chronic urinary frequency.  Woke up this am with dizziness lasting about 30min.  ED work up showing UTI, ct head hypodensity left cerebellum. No f/c, no change in bowel habits, no headache, syncope, no change in appetite, chronic fatique, sob associated with Hx of MDS with frequent blood transfusions in past now treated with medication.     Interval Hx:   Status post 2 units of packed red blood cells, MRI results have been discussed with patient and daughter present at bedside.  Patient was going to discharge today but vitals were reviewed and noted to have a heart rate of 30, EKG was ordered that showed new bigeminy with suspected AV block, troponins have been ordered because patient has not complained of chest pain, cardiologist has also been consulted.  Patient will not discharge on today.  Plan of care has been discussed with nurse and patient/family.    Objective/physical exam:  General: Appears comfortable, no acute distress.  Integumentary: Warm, dry, intact.  Musculoskeletal: Purposeful movement noted.     Respiratory: No accessory muscle use. Breath sounds are equal.  Cardiovascular: Regular rate. No peripheral edema.    VITAL SIGNS: 24 HRS MIN & MAX LAST   Temp  Min: 97.4 °F (36.3 °C)  Max: 98.2 °F (36.8 °C) 97.7 °F (36.5 °C)   BP  Min: 114/62  Max: 152/68 (!) 152/68     Pulse  Min: 65  Max: 82  66   Resp  Min: 16  Max: 18 18   SpO2  Min: 97 %  Max: 100 % 100 %     CV Ultrasound Bilateral  Doppler Carotid  Less than 50% right internal carotid artery.  Less than 50% left internal carotid artery.  Bilateral antegrade vertebral arteries.    Recent Labs   Lab 08/31/22  1013 09/01/22  0336 09/02/22  0912   WBC 2.5* 3.4* 3.8*   RBC 2.04* 1.87* 3.59*   HGB 7.3* 6.7* 12.0   HCT 22.5* 22.0* 36.4*   .3* 117.6* 101.4*   MCH 35.8* 35.8* 33.4*   MCHC 32.4* 30.5* 33.0   RDW 19.2* 19.5* 20.8*    217 260   MPV 13.3* 13.7* 13.0*       Recent Labs   Lab 08/31/22  1013 09/01/22  0336 09/02/22 0912    139 140   K 4.1 4.2 3.8   CO2 24 23 23   BUN 22.6* 23.9* 20.7*   CREATININE 0.84 0.79 0.72   CALCIUM 9.3 8.7 8.9   MG  --   --  2.10   ALBUMIN 3.7  --   --    ALKPHOS 76  --   --    ALT 11  --   --    AST 14  --   --    BILITOT 0.5  --   --           Microbiology Results (last 7 days)       Procedure Component Value Units Date/Time    Urine culture [[id number]]  (Abnormal)  (Susceptibility) Collected: 08/31/22 0957    Order Status: Completed Specimen: Urine Updated: 09/02/22 1036     Urine Culture >/= 100,000 colonies/ml Escherichia coli             See below for Radiology    Scheduled Med:   aspirin  81 mg Oral Daily    calcium carbonate  1 tablet Oral Daily    carvediloL  6.25 mg Oral BID    cefTRIAXone (ROCEPHIN) IVPB  1 g Intravenous Q24H    enoxaparin  30 mg Subcutaneous Daily    famotidine  20 mg Oral Daily    isosorbide mononitrate  60 mg Oral Daily    levothyroxine  88 mcg Oral Daily    losartan  100 mg Oral Daily    pravastatin  20 mg Oral Daily        Continuous Infusions:       PRN Meds:  sodium chloride, melatonin, ondansetron, sodium chloride 0.9%     Nutrition Status:  As tolerated    Assessment/Plan:  Dizziness   Hypodensity of left cerebellum  Urinary tract infection  MDS   Anemia    Plan   Patient was going to discharge today but vitals were reviewed and noted to have a heart rate of 30, EKG was ordered that showed new bigeminy with suspected AV block, troponins have been ordered because  patient has not complained of chest pain, cardiologist has also been consulted.  Patient will not discharge on today.  Plan of care has been discussed with nurse and patient/family.    Presents for dizziness--may be multifactorial given concern for metastatic lesions present on school and ischemic changes noted on MRI concerning for stroke.  CT of the head shows a small hypodensity left cerebellum at age indeterminate and may represent recent ischemic changes.  Continue medical optimization with aspirin, statin therapy.     UTI-completed 3 day course of Rocephin    Anemic--status post 2 units of packed red blood cells.  likely related to MDS, after reviewing recent hematology documentation 07/2022 patient and family did not want to pursue any further bone marrow biopsy and would prefer blood transfusion is needed.      This is a critical care document  Critical Care Diagnosis:  Symptomatic bradycardia  Critical care interventions: hands on evaluation, review of labs/radiographs/records and discussions; assessing and managing the high probability of imminent or life-threatening deterioration of cardiorespiratory status.  Critical care time spent > 40 minutes.     Anticipated discharge and Disposition:  Pending.     All diagnosis and differential diagnosis have been reviewed; assessment and plan has been documented; I have personally reviewed the labs and test results that are presently available; I have reviewed the patients medication list; I have reviewed the consulting providers response and recommendations. I have reviewed or attempted to review medical records based upon their availability    All of the patient's questions have been  addressed and answered. Patient's is agreeable to the above stated plan.   I will continue to monitor closely and make adjustments to medical management as needed.      Julianne Haq, DO   09/02/2022        This note was created with the assistance of Dragon voice recognition  software. There may be transcription errors as a result of using this technology however minimal. Effort has been made to assure accuracy of transcription but any obvious errors or omissions should be clarified with the author of the document.

## 2022-09-02 NOTE — CONSULTS
Inpatient consult to Cardiology  Consult performed by: ERIN Alexandra  Consult ordered by: Julianne aHq DO    [facility] - 9th Floor Med Surg  Cardiology  Consult Note    Patient Name: Edith Alvarado  MRN: [id number]  Admission Date: 8/31/2022  Hospital Length of Stay: 2 days  Code Status: DNR   Attending Provider: Pancho Lin MD   Consulting Provider: ERIN Alexandra  Primary Care Physician: Tiffany Cameron MD  Principal Problem:Dizziness    Patient information was obtained from patient and ER records.     Subjective:     Chief Complaint:       HPI:   Ms. Alvarado is a 89y/o female, followed by Dr. Gao, with PMHx significant for PAD, CVI, CAD, L BBB, Aortic stenosis, HTN, HLD, MDS, and anemia who presented to ED with c/o 2 week hx intermittent abdominal and occasional dizziness. CT head revealing hypodensity left cerebella. MRI completed for further evaluation which revealed subacute ischemic changes in left cerebellum with associated enhancement and multiple bone marrow lesion in skull raising concern for metastatic disease. Lab significant for anemia- H/H 7.3/22.5, UA+ with Urine culture + E.Coli.  She was transfused 2 units of PRBCs with improvement in H/H to 12/36.4 and has been started on Abx therapy. During DC today, patient c/o dizziness. Tele had already been discontinued; therefore nurse counted pulse rate which was in the 30's. Stat EKG obtained revealed SR 60's. [facility] has been consulted for bradycardia/recommendations from cardiology to decrease vs discontinue Coreg.   **patient has hx of MDS requiring frequent blood transfusions*    PMH: PAD, CVI, CAD, L BBB, Aortic stenosis, HTN, HLD, MDS, and anemia  PSH: appendectomy, bladder suspension, bone marrow aspiration, cholecystectomy, colonoscopy, hysterectomy,   Family History: Moter- MI-reason for death; Mother- cancer-reason for death; Brother- MI reason for death; Sister- MI reason for death  Social History:  never smoker    Previous Cardiac Diagnostics:   TTE 09/01/22:  NO evidence of intracardiac shunting. Mild concentric hypertrophy and normal systolic function. Estimated EF 56%. Екатерина RV size/systolic function. Moderate-severe AS. Peak AV 3.6m/sec, MG 28 mmHg, ANAM 0.84 cm2. DI 0.26. Mild to moderate AR. Mild MR/TR. Estimated PASP 33 mmHg.     CUS 05/20/21:  1-39% stenosis in proximal bilateral ICAs  Greater than 50% stenosis in right external carotid artery.   Antegrade flow to bilateral  vertebral arteries.     TTE 04/14/21:  LV normal in size. Global LVSF normal. LVEF 55%. Moderate to severe calcification of AoV noted. Suspect at least moderate AS with MG 24 mmHg, PV 3.3m/sed, ANAM 0.8cm2. DI 0.3. Mild DE, mild to moderate TR/AR, moderate MR.    PET 02/07/19:  Abnormal perfusion study. Study is consistent with prior infarction. Small fixed perfusion abnormality of mild intensity in the apical segment. Small fixed perfusio abnormality of mild intensity in the apical lateral segment. LV cavity normal on stress studies. Stess LVEF59% and LV global function is normal. Rest LV cavity normal with rest LVEF 56% and rest LV global function normal.     [facility] 11/28/2017:  LM: Luminal irregularities.  Lcx: Codominant vessel, 20% proximal lesion, 60% OM1, 90% small vessl. *0% distal OM2.  LAD: 30% proximal, 60% ostial D1. 99% mLAD reduced to 9% post PCI with LORETTA. Patent mid to distal LAD stent. 30% distal stenosis.  RCA: 30% proximal ISR, 70% mid to distal lesion reduced to 0% post pCI/LORETTA. Distal 50% stenosis, right PLB with left to right collateral subtotally occluded.     Past Medical History:   Diagnosis Date    Anemia     CAD (coronary artery disease)     HLD (hyperlipidemia)     Hypertension     Leukopenia     Macrocytic anemia     MDS (myelodysplastic syndrome) with 5q deletion     Neutropenia        Past Surgical History:   Procedure Laterality Date    APPENDECTOMY      BLADDER SUSPENSION      BONE MARROW ASPIRATION N/A  7/5/2022    Procedure: ASPIRATION, BONE MARROW;  Surgeon: Reinier Vences MD;  Location: [facility] OR;  Service: General;  Laterality: N/A;    BONE MARROW BIOPSY  07/05/2022    cataract surgery      CHOLECYSTECTOMY      COLONOSCOPY      CORONARY STENT PLACEMENT      HYSTERECTOMY         Review of patient's allergies indicates:   Allergen Reactions    Lortab [hydrocodone-acetaminophen] Hives    Codeine Rash    Ranexa [ranolazine] Other (See Comments)     Leg Cramps       No current facility-administered medications on file prior to encounter.     Current Outpatient Medications on File Prior to Encounter   Medication Sig    aspirin (ECOTRIN) 81 MG EC tablet Take 81 mg by mouth once daily.    calcium carbonate (TUMS) 200 mg calcium (500 mg) chewable tablet Take 1 tablet by mouth once daily.    carvediloL (COREG) 6.25 MG tablet Take 6.25 mg by mouth 2 (two) times daily.    ergocalciferol (ERGOCALCIFEROL) 50,000 unit Cap Take 5,000 Units by mouth nightly.    isosorbide mononitrate (IMDUR) 60 MG 24 hr tablet Take 60 mg by mouth once daily.    levothyroxine (SYNTHROID) 88 MCG tablet Take 1 tablet (88 mcg total) by mouth once daily.    losartan (COZAAR) 100 MG tablet Take 100 mg by mouth once daily.    pravastatin (PRAVACHOL) 20 MG tablet Take 20 mg by mouth once daily.     Family History       Family history is unknown by patient.          Tobacco Use    Smoking status: Never    Smokeless tobacco: Never   Substance and Sexual Activity    Alcohol use: Not Currently    Drug use: Never    Sexual activity: Not on file       Review of Systems   Constitutional: Negative.    Respiratory: Negative.     Cardiovascular: Negative.    Gastrointestinal: Negative.    Genitourinary: Negative.    Musculoskeletal: Negative.    Neurological: Negative.    Psychiatric/Behavioral: Negative.       Objective:     Vital Signs (Most Recent):  Temp: 97.7 °F (36.5 °C) (09/02/22 0800)  Pulse: 66 (09/02/22 0800)  Resp: 18 (09/02/22 0800)  BP: (!) 152/68  (09/02/22 0800)  SpO2: 100 % (09/02/22 0800)   Vital Signs (24h Range):  Temp:  [97.4 °F (36.3 °C)-98.2 °F (36.8 °C)] 97.7 °F (36.5 °C)  Pulse:  [65-82] 66  Resp:  [16-18] 18  SpO2:  [97 %-100 %] 100 %  BP: (114-152)/(51-85) 152/68     Weight: 60.8 kg (134 lb)  Body mass index is 24.51 kg/m².    SpO2: 100 %  O2 Device (Oxygen Therapy): room air      Intake/Output Summary (Last 24 hours) at 9/2/2022 1401  Last data filed at 9/2/2022 0430  Gross per 24 hour   Intake 600 ml   Output --   Net 600 ml       Lines/Drains/Airways       Peripheral Intravenous Line  Duration                  Peripheral IV - Single Lumen 08/31/22 1058 20 G Anterior Antecubital 2 days                    Significant Labs:  Recent Results (from the past 72 hour(s))   COVID-19 Rapid Screening    Collection Time: 08/31/22  9:57 AM   Result Value Ref Range    SARS COV-2 MOLECULAR Negative Negative   Urinalysis, Reflex to Urine Culture Urine, Clean Catch    Collection Time: 08/31/22  9:57 AM    Specimen: Urine   Result Value Ref Range    Color, UA Straw (A) Yellow, Colorless, Other, Clear    Appearance, UA SL CLOUDY (A) Clear    Specific Gravity, UA <=1.005     pH, UA 6.0 5.0, 5.5, 6.0, 6.5, 7.0, 7.5, 8.0, 8.5    Protein, UA Negative Negative, 300  mg/dL    Glucose, UA Negative Negative, Normal mg/dL    Ketones, UA Negative Negative, +1, +2, +3, +4, +5, >=160, >=80 mg/dL    Blood, UA Negative Negative unit/L    Bilirubin, UA Negative Negative mg/dL    Urobilinogen, UA 0.2 0.2, 1.0, Normal mg/dL    Nitrites, UA Positive (A) Negative    Leukocyte Esterase, UA Moderate (A) Negative, 75  unit/L   Urinalysis, Microscopic    Collection Time: 08/31/22  9:57 AM   Result Value Ref Range    Bacteria, UA Many (A) None Seen, Rare, Occasional /HPF    RBC, UA None Seen None Seen, 0-2, 3-5, 0-5 /HPF    WBC, UA 11-20 (A) None Seen, 0-2, 3-5, 0-5 /HPF    Squamous Epithelial Cells, UA Few (A) None Seen, Rare, Occasional, Occ /HPF   Urine culture    Collection Time:  08/31/22  9:57 AM    Specimen: Urine   Result Value Ref Range    Urine Culture >/= 100,000 colonies/ml Escherichia coli (A)        Susceptibility    Escherichia coli -  (no method available)     Amox/K Clav 8 Sensitive      Ampicillin >=32 Resistant      Cefepime <=0.12 Sensitive      Ceftriaxone <=0.25 Sensitive      Cefuroxime 4 Sensitive      Ciprofloxacin <=0.25 Sensitive      Gentamicin <=1 Sensitive      Levofloxacin 1 Intermediate      Meropenem <=0.25 Sensitive      Nitrofurantoin <=16 Sensitive      Piperacillin/Tazobactam <=4 Sensitive      Trimethoprim/Sulfamethoxazole <=20 Sensitive      Tetracycline <=1 Sensitive      Tobramycin <=1 Sensitive    Comprehensive metabolic panel    Collection Time: 08/31/22 10:13 AM   Result Value Ref Range    Sodium Level 143 132 - 146 mmol/L    Potassium Level 4.1 3.5 - 5.1 mmol/L    Chloride 109 98 - 111 mmol/L    Carbon Dioxide 24 23 - 31 mmol/L    Glucose Level 108 75 - 121 mg/dL    Blood Urea Nitrogen 22.6 (H) 9.8 - 20.1 mg/dL    Creatinine 0.84 0.55 - 1.02 mg/dL    Calcium Level Total 9.3 8.4 - 10.2 mg/dL    Protein Total 6.5 5.8 - 7.6 gm/dL    Albumin Level 3.7 3.4 - 4.8 gm/dL    Globulin 2.8 2.4 - 3.5 gm/dL    Albumin/Globulin Ratio 1.3 1.1 - 2.0 ratio    Bilirubin Total 0.5 <=1.5 mg/dL    Alkaline Phosphatase 76 40 - 150 unit/L    Alanine Aminotransferase 11 0 - 55 unit/L    Aspartate Aminotransferase 14 5 - 34 unit/L    eGFR >60 mls/min/1.73/m2   Lipase    Collection Time: 08/31/22 10:13 AM   Result Value Ref Range    Lipase Level 34 <=60 U/L   CBC with Differential    Collection Time: 08/31/22 10:13 AM   Result Value Ref Range    WBC 2.5 (L) 4.5 - 11.5 x10(3)/mcL    RBC 2.04 (L) 4.20 - 5.40 x10(6)/mcL    Hgb 7.3 (L) 12.0 - 16.0 gm/dL    Hct 22.5 (L) 37.0 - 47.0 %    .3 (H) 80.0 - 94.0 fL    MCH 35.8 (H) 27.0 - 31.0 pg    MCHC 32.4 (L) 33.0 - 36.0 mg/dL    RDW 19.2 (H) 11.5 - 17.0 %    Platelet 254 130 - 400 x10(3)/mcL    MPV 13.3 (H) 7.4 - 10.4 fL    IPF  14.5 (H) 0.9 - 11.2 %    IG# 0.03 0 - 0.04 x10(3)/mcL    IG% 1.2 %    NRBC% 0.0 %   Manual Differential    Collection Time: 08/31/22 10:13 AM   Result Value Ref Range    Neut Man 42 (L) 47 - 80 %    Band Neutrophil Man 1 0 - 11 %    Lymph Man 48 (H) 13 - 40 %    Monocyte Man 6 2 - 11 %    Eos Man 2 0 - 8 %    [place] Man 1 %    Abs Neut calc 1.1 (L) 2.1 - 9.2 x10(3)/mcL    Abs Mono 0.15 0.1 - 1.3 x10(3)/mcL    Abs Lymp 1.2 0.6 - 4.6 x10(3)/mcL    Abs Eos  0.05 0 - 0.9 x10(3)/mcL    RBC Morph Abnormal (A) Normal    Anisocyte 2+ (A) (none)    Macrocyte 2+ (A) (none)    Platelet Est Adequate Normal, Adequate   TSH    Collection Time: 08/31/22 10:15 AM   Result Value Ref Range    Thyroid Stimulating Hormone 2.3216 0.3500 - 4.9400 uIU/mL   Troponin I    Collection Time: 08/31/22 10:15 AM   Result Value Ref Range    Troponin-I <0.010 0.000 - 0.045 ng/mL   Basic Metabolic Panel    Collection Time: 09/01/22  3:36 AM   Result Value Ref Range    Sodium Level 139 132 - 146 mmol/L    Potassium Level 4.2 3.5 - 5.1 mmol/L    Chloride 111 98 - 111 mmol/L    Carbon Dioxide 23 23 - 31 mmol/L    Glucose Level 102 75 - 121 mg/dL    Blood Urea Nitrogen 23.9 (H) 9.8 - 20.1 mg/dL    Creatinine 0.79 0.55 - 1.02 mg/dL    BUN/Creatinine Ratio 30     Calcium Level Total 8.7 8.4 - 10.2 mg/dL    Anion Gap 5.0 mEq/L    eGFR >60 mls/min/1.73/m2   CBC with Differential    Collection Time: 09/01/22  3:36 AM   Result Value Ref Range    WBC 3.4 (L) 4.5 - 11.5 x10(3)/mcL    RBC 1.87 (L) 4.20 - 5.40 x10(6)/mcL    Hgb 6.7 (L) 12.0 - 16.0 gm/dL    Hct 22.0 (L) 37.0 - 47.0 %    .6 (H) 80.0 - 94.0 fL    MCH 35.8 (H) 27.0 - 31.0 pg    MCHC 30.5 (L) 33.0 - 36.0 mg/dL    RDW 19.5 (H) 11.5 - 17.0 %    Platelet 217 130 - 400 x10(3)/mcL    MPV 13.7 (H) 7.4 - 10.4 fL    IG# 0.06 (H) 0 - 0.04 x10(3)/mcL    IG% 1.8 %    NRBC% 0.0 %   Manual Differential    Collection Time: 09/01/22  3:36 AM   Result Value Ref Range    Neut Man 58 %    Lymph Man 38 %     Monocyte Man 3 %    Eos Man 1 %    Instr WBC 3.4 x10(3)/mcL    Abs Mono 0.102 0.1 - 1.3 x10(3)/mcL    Abs Eos  0.034 0 - 0.9 x10(3)/mcL    Abs Lymp 1.292 0.6 - 4.6 x10(3)/mcL    Abs Neut 1.972 (L) 2.1 - 9.2 x10(3)/mcL    RBC Morph Abnormal (A) Normal    Anisocyte 1+ (A) (none)    Poik 1+ (A) (none)    Macrocyte 1+ (A) (none)    Elliptocytosis 1+ (A) (none)    Platelet Est Normal Normal, Adequate   CV Ultrasound Bilateral Doppler Carotid    Collection Time: 09/01/22  7:40 AM   Result Value Ref Range    Left ICA/CCA ratio 1.34     Right ICA/CCA ratio 1.36     Left Highest .00     Left Highest CCA 86     Right Highest .00     Right Highest CCA 84     Right Highest EDV 23.00     LT Highest EDV 21.00     Right CCA prox sys 71 cm/s    Right CCA prox sauceda 14 cm/s    Right CCA dist sys 84 cm/s    Right CCA dist sauceda 16 cm/s    Right ICA prox sys 114 cm/s    Right ICA prox sauceda 18 cm/s    Right ICA mid sys 102 cm/s    Right ICA mid sauceda 23 cm/s    Right ICA dist sys 95 cm/s    Right ICA dist sauceda 20 cm/s    Right ECA sys 343 cm/s    Right vertebral sys 61 cm/s    Left CCA prox sys 85 cm/s    Left CCA prox sauceda 0 cm/s    Left CCA dist sys 86 cm/s    Left CCA dist sauceda 0 cm/s    Left ICA prox sys 92 cm/s    Left ICA prox sauceda 21 cm/s    Left ICA mid sys 73 cm/s    Left ICA mid sauceda 16 cm/s    Left ICA dist sys 115 cm/s    Left ICA dist sauceda 20 cm/s    Left ECA sys 82 cm/s    Left vertebral sys 130 cm/s    Right vertebral sauceda 0 cm/s    Right ECA sauceda 0 cm/s    Left vertebral sauceda 21 cm/s    Left ECA sauceda 0 cm/s   Echo    Collection Time: 09/01/22  8:15 AM   Result Value Ref Range    BSA 1.63 m2    TDI SEPTAL 0.04 m/s    LV LATERAL E/E' RATIO 11.25 m/s    LV SEPTAL E/E' RATIO 22.50 m/s    Right Atrial Pressure (from IVC) 8 mmHg    EF 56 %    Left Ventricular Outflow Tract Mean Velocity 0.63 cm/s    Left Ventricular Outflow Tract Mean Gradient 2.00 mmHg    TDI LATERAL 0.08 m/s    PV PEAK VELOCITY 1.01 cm/s    LVIDd  3.18 (A) 3.5 - 6.0 cm    IVS 1.29 0.6 - 1.1 cm    Posterior Wall 1.36 0.6 - 1.1 cm    LVIDs 2.13 2.1 - 4.0 cm    FS 33 28 - 44 %    LV mass 138.71 g    LA size 3.60 cm    TAPSE 2.21 cm    Left Ventricle Relative Wall Thickness 0.86 cm    AV regurgitation pressure 1/2 time 343 ms    AV mean gradient 28 mmHg    AV valve area 0.84 cm2    AV Velocity Ratio 0.26     AV index (prosthetic) 0.27     E/A ratio 0.96     Mean e' 0.06 m/s    E wave deceleration time 185.00 msec    LVOT diameter 2.00 cm    LVOT area 3.1 cm2    LVOT peak slim 0.92 m/s    LVOT peak VTI 20.40 cm    Ao peak slim 3.57 m/s    Ao VTI 76.0 cm    LVOT stroke volume 64.06 cm3    AV peak gradient 51 mmHg    TV rest pulmonary artery pressure 33 mmHg    E/E' ratio 15.00 m/s    MV Peak E Slim 0.90 m/s    AR Max Slim 3.42 m/s    TR Max Slim 2.52 m/s    MV Peak A Slim 0.94 m/s    LV Systolic Volume 14.90 mL    LV Systolic Volume Index 9.3 mL/m2    LV Diastolic Volume 40.30 mL    LV Diastolic Volume Index 25.03 mL/m2    LV Mass Index 86 g/m2    Triscuspid Valve Regurgitation Peak Gradient 25 mmHg    LA Volume Index (Mod) 42.0 mL/m2    LA volume (mod) 67.70 cm3   Folate    Collection Time: 09/01/22  2:48 PM   Result Value Ref Range    Folate Level 14.9 7.0 - 31.4 ng/mL   Prepare RBC 2 Units; low Hemoglobin    Collection Time: 09/01/22  4:39 PM   Result Value Ref Range    UNIT NUMBER [id number]     UNIT ABO/RH A POS     DISPENSE STATUS Transfused     Unit Expiration [id number]     Product Code A8869W30     Unit Blood Type Code 6200     CROSSMATCH INTERPRETATION Compatible     UNIT NUMBER [id number]     UNIT ABO/RH A POS     DISPENSE STATUS Issued     Unit Expiration [id number]     Product Code O9630N66     Unit Blood Type Code 6200     CROSSMATCH INTERPRETATION Compatible    Type & Screen    Collection Time: 09/01/22  4:39 PM   Result Value Ref Range    Group & Rh A POS     Indirect [place] GEL NEG    Phosphorus    Collection Time: 09/02/22  9:12 AM   Result  Value Ref Range    Phosphorus Level 2.8 2.3 - 4.7 mg/dL   Basic Metabolic Panel    Collection Time: 09/02/22  9:12 AM   Result Value Ref Range    Sodium Level 140 132 - 146 mmol/L    Potassium Level 3.8 3.5 - 5.1 mmol/L    Chloride 109 98 - 111 mmol/L    Carbon Dioxide 23 23 - 31 mmol/L    Glucose Level 109 75 - 121 mg/dL    Blood Urea Nitrogen 20.7 (H) 9.8 - 20.1 mg/dL    Creatinine 0.72 0.55 - 1.02 mg/dL    BUN/Creatinine Ratio 29     Calcium Level Total 8.9 8.4 - 10.2 mg/dL    Anion Gap 8.0 mEq/L    eGFR >60 mls/min/1.73/m2   Magnesium    Collection Time: 09/02/22  9:12 AM   Result Value Ref Range    Magnesium Level 2.10 1.60 - 2.60 mg/dL   CBC with Differential    Collection Time: 09/02/22  9:12 AM   Result Value Ref Range    WBC 3.8 (L) 4.5 - 11.5 x10(3)/mcL    RBC 3.59 (L) 4.20 - 5.40 x10(6)/mcL    Hgb 12.0 12.0 - 16.0 gm/dL    Hct 36.4 (L) 37.0 - 47.0 %    .4 (H) 80.0 - 94.0 fL    MCH 33.4 (H) 27.0 - 31.0 pg    MCHC 33.0 33.0 - 36.0 mg/dL    RDW 20.8 (H) 11.5 - 17.0 %    Platelet 260 130 - 400 x10(3)/mcL    MPV 13.0 (H) 7.4 - 10.4 fL    IG# 0.10 (H) 0 - 0.04 x10(3)/mcL    IG% 2.7 %    NRBC% 0.0 %   Manual Differential    Collection Time: 09/02/22  9:12 AM   Result Value Ref Range    Neut Man 55 %    Lymph Man 27 %    Monocyte Man 9 %    Eos Man 5 %    Basophil Man 4 %    [place] Man 1 %    Instr WBC 4 x10(3)/mcL    Abs Mono 0.36 0.1 - 1.3 x10(3)/mcL    Abs Eos  0.2 0 - 0.9 x10(3)/mcL    Abs Baso 0.16 0 - 0.2 x10(3)/mcL    Abs Lymp 1.08 0.6 - 4.6 x10(3)/mcL    Abs Neut 2.24 2.1 - 9.2 x10(3)/mcL    RBC Morph Abnormal (A) Normal    Anisocyte 2+ (A) (none)    Poik 1+ (A) (none)    Macrocyte 1+ (A) (none)    Ovalocytes 1+ (A) (none)    [place] Cells 1+ (A) (none)    Platelet Est Normal Normal, Adequate   Troponin I    Collection Time: 09/02/22 12:54 PM   Result Value Ref Range    Troponin-I <0.010 0.000 - 0.045 ng/mL       Significant Imaging:  Imaging Results              MRI Brain W WO Contrast (Final result)   Result time 09/01/22 13:59:20      Final result by Maddie Lux MD (09/01/22 13:59:20)                   Impression:      1. Likely subacute ischemic changes in the left cerebellum with associated enhancement.  Follow-up can be obtained to ensure resolution.  2. Multiple enhancing bone marrow lesions in the skull raises the concern for metastatic disease.      Electronically signed by: Maddie Lux  Date:    09/01/2022  Time:    13:59               Narrative:    EXAMINATION:  MRI BRAIN W WO CONTRAST    CLINICAL HISTORY:  Stroke, follow up;    TECHNIQUE:  Multiplanar, multisequence MR images of the brain were obtained with and without administration of intravenous contrast.    COMPARISON:  CT head dated 08/31/2022    FINDINGS:  There is a linear focus of likely subacute restricted diffusion in the left cerebellum with associated enhancement.  There are mild scattered T2/FLAIR hyperintensities in the subcortical and periventricular white matter, likely represent chronic microvascular ischemic changes.  There is otherwise no abnormal parenchymal or leptomeningeal enhancement.    There is no mass effect or midline shift.  The basal cisterns are patent.  There is moderate diffuse parenchymal volume loss.  There is no hydrocephalus or abnormal extra-axial fluid collection.  The major intracranial flow voids are patent.  The paranasal sinuses and mastoid air cells are clear.  There are a few scattered T1 hypointense enhancing bone marrow lesions in the calvarium, the largest in the left frontal bone measuring 1.7 x 2.7 cm in size (series 10, image 3).                                       CT Head Without Contrast (Final result)  Result time 08/31/22 11:39:42      Final result by Maddie Lux MD (08/31/22 11:39:42)                   Impression:      1. Small hypodensity in the left cerebellum is age indeterminate and may represent recent ischemic changes.  MRI is available for confirmation.  2. Chronic  microvascular ischemic changes.      Electronically signed by: Maddie Lux  Date:    08/31/2022  Time:    11:39               Narrative:    EXAMINATION:  CT HEAD WITHOUT CONTRAST    CLINICAL HISTORY:  Dizziness, persistent/recurrent, cardiac or vascular cause suspected;    TECHNIQUE:  Axial scans were obtained from skull base to the vertex.    Coronal and sagittal reconstructions obtained from the axial data.    Automatic exposure control was utilized to limit radiation dose.    Contrast: None    Radiation Dose:    Total DLP: 750 mGy*cm    COMPARISON:  None    FINDINGS:  There is no acute intracranial hemorrhage or edema.  There is a linear hypodensity in the left cerebellum which is age indeterminate (series 6, image 28).  Scattered hypodensities in the subcortical and periventricular white matter likely represent chronic microvascular ischemic changes    There is no mass effect or midline shift.  There is diffuse parenchymal loss.  The basal cisterns are patent. There is no abnormal extra-axial fluid collection.  Carotid and vertebral artery calcifications are noted.    The calvarium and skull base are intact. The visualized paranasal sinuses and the mastoid air cells are clear.                                       CT Abdomen Pelvis With Contrast (Final result)  Result time 08/31/22 11:43:46      Final result by Margot Reilly MD (08/31/22 11:43:46)                   Impression:      Findings consistent with right-sided hydroureter with no associated hydronephrosis..  No ureterolithiasis is seen and no obvious renal or ureteral mass is seen but there is some inflammatory changes in the urinary bladder which may be causing the ureteral dilatation.  Follow-up is recommended    Mild hepatomegaly    Chronic lung changes in the lung bases bilaterally    Small inguinal hernia on the left side containing some fat      Electronically signed by: Margot Reilly  Date:    08/31/2022  Time:    11:43                Narrative:    EXAMINATION:  CT ABDOMEN PELVIS WITH CONTRAST    CLINICAL HISTORY:  Abdominal pain, acute, nonlocalized;IF GFR OK;    TECHNIQUE:  Low dose axial images, sagittal and coronal reformations were obtained from the lung bases to the pubic symphysis following the IV administration of contrast. Automatic exposure control (AEC) is utilized to reduce patient radiation exposure.    COMPARISON:  None.    FINDINGS:  The lung bases are clear.  There is some chronic appearing interstitial lung changes in the lung bases bilaterally    The liver is slightly enlarged in size .  No liver mass or lesion is seen.  Portal and hepatic veins appear normal.    The patient is status post cholecystectomy.    The pancreas appears normal.  No pancreatic mass or lesion is seen.    The spleen shows no acute abnormality.    The adrenal glands appear normal.  No adrenal nodule is seen.    There is evidence of right-sided hydroureter with no significant hydronephrosis seen..  No nephro or ureterolithiasis is seen.  The ureter is dilated down to level of the urinary bladder and the urinary bladder has inflammatory change associated with it.  No obvious bladder mass or lesion is seen.  No bladder stone is seen.    The left kidney appears grossly unremarkable with no evidence of hydronephrosis or hydroureter.    There is a small inguinal hernia on the left side containing some fat.    No colitis is seen.  No diverticulitis is seen.  No obvious colonic mass or lesion is seen.    No free air is seen.  No free fluid is seen.                                      EKG:    Results for orders placed or performed during the hospital encounter of 08/31/22   EKG 12-lead    Narrative    Test Reason : I49.9,    Vent. Rate : 061 BPM     Atrial Rate : 061 BPM     P-R Int : 162 ms          QRS Dur : 128 ms      QT Int : 426 ms       P-R-T Axes : 062 -62 115 degrees     QTc Int : 428 ms    Sinus rhythm with Premature atrial complexes in a pattern of  bigeminy  Left axis deviation  Nonspecific intraventricular block  Minimal voltage criteria for LVH, may be normal variant ( [place] product )  T wave abnormality, consider lateral ischemia  Abnormal ECG  Confirmed by Niles Campos MD ([id number]) on 9/2/2022 1:14:40 PM    Referred By: NYLA   SELF           Confirmed By:Niles Campos MD       Physical Exam  HENT:      Mouth/Throat:      Mouth: Mucous membranes are moist.   Cardiovascular:      Rate and Rhythm: Normal rate and regular rhythm.      Heart sounds: Murmur heard.   Pulmonary:      Breath sounds: Normal breath sounds.   Abdominal:      Palpations: Abdomen is soft.   Musculoskeletal:         General: Normal range of motion.   Skin:     General: Skin is warm.      Capillary Refill: Capillary refill takes less than 2 seconds.   Neurological:      General: No focal deficit present.      Mental Status: She is alert.   Psychiatric:         Mood and Affect: Mood normal.       Home Medications:   No current facility-administered medications on file prior to encounter.     Current Outpatient Medications on File Prior to Encounter   Medication Sig Dispense Refill    aspirin (ECOTRIN) 81 MG EC tablet Take 81 mg by mouth once daily.      calcium carbonate (TUMS) 200 mg calcium (500 mg) chewable tablet Take 1 tablet by mouth once daily.      carvediloL (COREG) 6.25 MG tablet Take 6.25 mg by mouth 2 (two) times daily.      ergocalciferol (ERGOCALCIFEROL) 50,000 unit Cap Take 5,000 Units by mouth nightly.      isosorbide mononitrate (IMDUR) 60 MG 24 hr tablet Take 60 mg by mouth once daily.      levothyroxine (SYNTHROID) 88 MCG tablet Take 1 tablet (88 mcg total) by mouth once daily. 30 tablet 3    losartan (COZAAR) 100 MG tablet Take 100 mg by mouth once daily.      pravastatin (PRAVACHOL) 20 MG tablet Take 20 mg by mouth once daily.         Current Inpatient Medications:    Current Facility-Administered Medications:     0.9%  NaCl infusion (for blood administration), ,  Intravenous, Q24H PRN, Julianne Haq,     aspirin EC tablet 81 mg, 81 mg, Oral, Daily, Francisco Fierro MD, 81 mg at 09/02/22 0855    calcium carbonate 200 mg calcium (500 mg) chewable tablet 500 mg, 1 tablet, Oral, Daily, Francisco Fierro MD, 500 mg at 09/02/22 0854    carvediloL tablet 6.25 mg, 6.25 mg, Oral, BID, Francisco Fierro MD, 6.25 mg at 09/02/22 0854    cefTRIAXone (ROCEPHIN) 1 g in dextrose 5 % in water (D5W) 5 % 50 mL IVPB (MB+), 1 g, Intravenous, Q24H, Francisco Fierro MD, Stopped at 09/01/22 1501    enoxaparin injection 30 mg, 30 mg, Subcutaneous, Daily, Francisco Fierro MD, 30 mg at 09/01/22 1700    famotidine tablet 20 mg, 20 mg, Oral, Daily, Francisco Fierro MD, 20 mg at 09/02/22 0854    isosorbide mononitrate 24 hr tablet 60 mg, 60 mg, Oral, Daily, Francisco Fierro MD, 60 mg at 09/02/22 0851    levothyroxine tablet 88 mcg, 88 mcg, Oral, Daily, Francisco Fierro MD, 88 mcg at 09/02/22 0854    losartan tablet 100 mg, 100 mg, Oral, Daily, Francisco Fierro MD, 100 mg at 09/02/22 0854    melatonin tablet 6 mg, 6 mg, Oral, Nightly PRN, Francisco Fierro MD, 6 mg at 08/31/22 2225    ondansetron injection 4 mg, 4 mg, Intravenous, Q8H PRN, Francisco Fierro MD    pravastatin tablet 20 mg, 20 mg, Oral, Daily, Francisco Fierro MD, 20 mg at 09/02/22 0851    sodium chloride 0.9% flush 10 mL, 10 mL, Intravenous, PRN, Francisco Fierro MD         VTE Risk Mitigation (From admission, onward)           Ordered     enoxaparin injection 30 mg  Daily         08/31/22 1304     IP VTE HIGH RISK PATIENT  Once         08/31/22 1304     Place sequential compression device  Until discontinued         08/31/22 1304                    Assessment:   Subacute cerebellar CVA  Anemia  --hx MDS  --H/H 7.3/22.5--> 12/36.4 post transfusion 2 units PRBCs  UTI  --urine cx + E. Coli  Moderate to severe AS  --Peak AV velocity 3.6 m/sec, MG 28 mmHg, ANAM 0.84 cm2. DI 0.26.  Dizziness  Native CAD  Reported  Bradycardia  HTN  HLD    Plan:   Dizziness likely multifactorial due to subacute cva, anemia, and AS  Patient has been followed as outpatient for AS and does not wish to proceed with further workup/valve replacement.  DC coreg.   ABX per primary  Continue losartan.      Thank you for your consult.     ERIN Alexandra  Cardiology  [facility] - 9th Floor Med Surg  09/02/2022 2:01 PM

## 2022-09-02 NOTE — PLAN OF CARE
09/02/22 0916   Discharge Assessment   Assessment Type Discharge Planning Assessment   Confirmed/corrected address, phone number and insurance Yes   Confirmed Demographics Correct on Facesheet   Source of Information patient;family   Lives With alone   Do you expect to return to your current living situation? Yes   Do you have help at home or someone to help you manage your care at home? Yes   Prior to hospitilization cognitive status: Alert/Oriented   Current cognitive status: Alert/Oriented   Walking or Climbing Stairs Difficulty ambulation difficulty, requires equipment   Mobility Management walker   Dressing/Bathing Difficulty none   Home Accessibility wheelchair accessible   Home Layout Able to live on 1st floor   Equipment Currently Used at Home walker, rolling;cane, straight;shower chair  (Railings in bathroom)   Readmission within 30 days? No   Patient currently being followed by outpatient case management? No   Do you currently have service(s) that help you manage your care at home? Yes   Name and Contact number of agency [facility]   Is the pt/caregiver preference to resume services with current agency Yes   Do you take prescription medications? Yes   Do you have prescription coverage? Yes   Do you have any problems affording any of your prescribed medications? No   Is the patient taking medications as prescribed? yes   How do you get to doctors appointments? family or friend will provide   Are you on dialysis? No   Do you take coumadin? No   Discharge Plan A Home Health   Discharge Plan B Home Health   DME Needed Upon Discharge  none   Discharge Plan discussed with: Adult children;Patient   Discharge Barriers Identified None   Patient lives alone in a single story home. Patient has multiple children that live nearby that assist in patient care as needed. Patient is current with [facility]. Denies any current needs at this time.    Systemic lupus erythematosus, unspecified SLE type, unspecified organ involvement status

## 2022-09-02 NOTE — PLAN OF CARE
09/02/22 0958   Final Note   Assessment Type Final Discharge Note   Anticipated Discharge Disposition Home-Health   Hospital Resources/Appts/Education Provided Post-Acute resouces added to AVS   Post-Acute Status   Post-Acute Authorization Home Health   Home Health Status Set-up Complete/Auth obtained  (Resume with [facility])

## 2022-09-03 NOTE — PLAN OF CARE
Problem: Adult Inpatient Plan of Care  Goal: Plan of Care Review  Outcome: Ongoing, Progressing  Goal: Patient-Specific Goal (Individualized)  Outcome: Ongoing, Progressing  Goal: Absence of Hospital-Acquired Illness or Injury  Outcome: Ongoing, Progressing  Goal: Optimal Comfort and Wellbeing  Outcome: Ongoing, Progressing  Goal: Readiness for Transition of Care  Outcome: Ongoing, Progressing     Problem: Fall Injury Risk  Goal: Absence of Fall and Fall-Related Injury  Outcome: Ongoing, Progressing     Problem: Adult Inpatient Plan of Care  Goal: Plan of Care Review  9/3/2022 1155 by Eunice Goddard RN  Outcome: Met  9/3/2022 1155 by Eunice Goddard RN  Outcome: Ongoing, Progressing  Goal: Patient-Specific Goal (Individualized)  9/3/2022 1155 by Eunice Goddard RN  Outcome: Met  9/3/2022 1155 by Eunice Goddard RN  Outcome: Ongoing, Progressing  Goal: Absence of Hospital-Acquired Illness or Injury  9/3/2022 1155 by Eunice Goddard RN  Outcome: Met  9/3/2022 1155 by Eunice Goddard RN  Outcome: Ongoing, Progressing  Goal: Optimal Comfort and Wellbeing  9/3/2022 1155 by Eunice Goddard RN  Outcome: Met  9/3/2022 1155 by Eunice Goddard RN  Outcome: Ongoing, Progressing  Goal: Readiness for Transition of Care  9/3/2022 1155 by Eunice Goddard RN  Outcome: Met  9/3/2022 1155 by Eunice Goddard RN  Outcome: Ongoing, Progressing     Problem: Fall Injury Risk  Goal: Absence of Fall and Fall-Related Injury  9/3/2022 1155 by Eunice Goddard RN  Outcome: Met  9/3/2022 1155 by Eunice Goddard RN  Outcome: Ongoing, Progressing

## 2022-09-03 NOTE — DISCHARGE SUMMARY
[facility] Hospital Medicine Discharge Summary    Admit Date: 8/31/2022  Discharge Date and Time: 9/3/64183:28 AM  Admitting Physician:  Team  Discharging Physician: Julianne Haq DO.  Primary Care Physician: Tiffany Cameron MD  Consults: Cardiology    Discharge Diagnoses:  Dizziness -resolved  Hypodensity of left cerebellum  Urinary tract infection  MDS   Anemia    Hospital Course:   89 y.o. female who  has a past medical history of Anemia, CAD (coronary artery disease), HLD (hyperlipidemia), Hypertension, Leukopenia, Macrocytic anemia, MDS (myelodysplastic syndrome) with 5q deletion, and Neutropenia.  The patient presented to [facility] on 8/31/2022 with a primary complaint of dizziness.  Pt complaint of lower abdominal/pelvic pain on/off x 2wks with associated incomplete emptying x 4days, chronic urinary frequency.  Woke up this am with dizziness lasting about 30min.  ED work up showing UTI, ct head hypodensity left cerebellum. No f/c, no change in bowel habits, no headache, syncope, no change in appetite, chronic fatique, sob associated with Hx of MDS with frequent blood transfusions in past now treated with medication.   Pt was seen and examined on the day of discharge:   Patient still with dysuria, will continue antibiotic therapy at discharge, 5 days of Keflex have been prescribed to patient's preferred pharmacy.  Though she has not had GI upset with antibiotic therapy during hospital course ox just probiotic if patient starts to have GI upset.    She is otherwise without complaints.    Patient was going to discharge on yesterday however heart rate was noted to be 30, EKG was order and it showed new bigeminy and suspected AV block.  She at some point concerned about chest pain does troponin was ordered but that has been noted to be negative.  Cardiologist was consulted and recommend discontinuation of Coreg.  This medication has been discussed has been discussed with patient  and family present at bedside.    Patient will follow-up with her cardiologist outpatient setting as well as her PCP and oncologist.  MRI results have been discussed with her daughter present at bedside on yesterday, skull lesions are present which are concerning for metastatic changes.  There is also suspicious ischemic changes concerning for stroke, patient neurological symptoms have improved significantly during hospital course.  Family will like to take patient home.  The family is conservative with patient's medical care to not proceed with further workup/valve evaluation and possible replacement.    She has a history of MDS and was transfused 2 units of packed red blood cells, she remains hemodynamically stable at this time.    Hospital course and discharge care plan has been discussed appropriately, patient is advised to return to ED or call 911 in case of emergency and arm symptoms worsen.    Vitals:  VITAL SIGNS: 24 HRS MIN & MAX LAST   Temp  Min: 96.7 °F (35.9 °C)  Max: 98.1 °F (36.7 °C) 98 °F (36.7 °C)   BP  Min: 116/60  Max: 157/63 (!) 157/63     Pulse  Min: 63  Max: 67  67   Resp  Min: 15  Max: 18 18   SpO2  Min: 98 %  Max: 98 % 98 %       Physical Exam:  Very pleasant lady appears well, younger than stated age.  Very difficult of hearing but answers questions appropriately.    She is alert awake, gait is appropriate.  Noted no tremor appreciable.    Normal conjunctiva.    Moist mucous membranes.      General: Appears comfortable, no acute distress.  Integumentary: Warm, dry, intact.  Musculoskeletal: Purposeful movement noted.   Respiratory: No accessory muscle use. Breath sounds are equal.  Cardiovascular: Regular rate. No peripheral edema.      Procedures Performed: No admission procedures for hospital encounter.     Significant Diagnostic Studies: See Full reports for all details    Recent Labs   Lab 09/01/22  0336 09/02/22  0912 09/03/22  0411   WBC 3.4* 3.8* 2.8*   RBC 1.87* 3.59* 3.35*   HGB 6.7*  12.0 11.0*   HCT 22.0* 36.4* 34.8*   .6* 101.4* 103.9*   MCH 35.8* 33.4* 32.8*   MCHC 30.5* 33.0 31.6*   RDW 19.5* 20.8* 20.9*    260 241   MPV 13.7* 13.0* 13.0*       Recent Labs   Lab 08/31/22  1013 09/01/22  0336 09/02/22  0912 09/03/22  0411    139 140 143   K 4.1 4.2 3.8 4.5   CO2 24 23 23 24   BUN 22.6* 23.9* 20.7* 22.7*   CREATININE 0.84 0.79 0.72 0.74   CALCIUM 9.3 8.7 8.9 9.2   MG  --   --  2.10 2.20   ALBUMIN 3.7  --   --   --    ALKPHOS 76  --   --   --    ALT 11  --   --   --    AST 14  --   --   --    BILITOT 0.5  --   --   --         Microbiology Results (last 7 days)       Procedure Component Value Units Date/Time    Urine culture [[id number]]  (Abnormal)  (Susceptibility) Collected: 08/31/22 0957    Order Status: Completed Specimen: Urine Updated: 09/02/22 1036     Urine Culture >/= 100,000 colonies/ml Escherichia coli             CV Ultrasound Bilateral Doppler Carotid  Less than 50% right internal carotid artery.  Less than 50% left internal carotid artery.  Bilateral antegrade vertebral arteries.         Medication List        START taking these medications      cephALEXin 500 MG capsule  Commonly known as: KEFLEX  Take 1 capsule (500 mg total) by mouth 2 (two) times a day. for 5 days            CONTINUE taking these medications      aspirin 81 MG EC tablet  Commonly known as: ECOTRIN     calcium carbonate 200 mg calcium (500 mg) chewable tablet  Commonly known as: TUMS     ergocalciferol 50,000 unit Cap  Commonly known as: ERGOCALCIFEROL     isosorbide mononitrate 60 MG 24 hr tablet  Commonly known as: IMDUR     levothyroxine 88 MCG tablet  Commonly known as: SYNTHROID  Take 1 tablet (88 mcg total) by mouth once daily.     losartan 100 MG tablet  Commonly known as: COZAAR     pravastatin 20 MG tablet  Commonly known as: PRAVACHOL            STOP taking these medications      carvediloL 6.25 MG tablet  Commonly known as: COREG               Where to Get Your Medications         These medications were sent to [facility]. [place], [place] - [address] [place] [address].  [address] [place] [address]      Phone: [phone]   cephALEXin 500 MG capsule          Explained in detail to the patient about the discharge plan, medications, and follow-up visits. Pt understands and agrees with the treatment plan  Discharge Disposition: Home with HH  Discharged Condition: stable  Diet-   Dietary Orders (From admission, onward)       Start     Ordered    08/31/22 1303  Diet heart healthy  (Diet/Nutrition [facility])  Diet effective now         08/31/22 1304                   Medications Per DC med rec  Activities as tolerated   Follow-up Information       [facility] [place] Follow up.    Why: This will be your home health company.  Contact information:  [address] [place] [address]  [phone]               Jeremiah Jeffries II, MD Follow up on 9/8/2022.    Specialties: Oncology, Hematology and Oncology  Why: please make sure they are aware of MRI results.  Contact information:  [address] [facility]  [address]  [place] [address]  [phone]               Rafael Gao MD Follow up in 1 month(s).    Specialties: Cardiovascular Disease, Cardiology  Why: echo repeat--moderate/severe aortic stenosis  Contact information:  [address] [facility]  [facility] [id number] [phone]                           For further questions contact hospitalist office    Discharge time 33 minutes    For worsening symptoms, chest pain, shortness of breath, increased abdominal pain, high grade fever, stroke or stroke like symptoms, immediately go to the nearest Emergency Room or call 911 as soon as possible.      Julianne Wilder M.D, on 9/3/2022. at 9:28 AM.

## 2022-09-07 NOTE — PROGRESS NOTES
C3 nurse spoke with Edith Alvarado's daughter, Edith for a TCC post hospital discharge follow up call. The patient does not have a scheduled HOSFU appointment with Edith Alvarado   within 5-7 days post hospital discharge date 9/3/22. C3 nurse was unable to schedule HOSFU appointment in [facility]. Daughter stated she will call to schedule appt.    Message sent to PCP staff requesting they contact patient and schedule follow up appointment.

## 2022-09-08 NOTE — TELEPHONE ENCOUNTER
Yes. I would like to see her earlier than her next scheduled appt with me 7/2023. It can be in the next 2-3 weeks

## 2022-09-08 NOTE — PROGRESS NOTES
Subjective:       Patient ID: Edith Alvarado is a 89 y.o. female.    Chief Complaint: Follow-up (5 week. Patient feeling better since being in hospital which showed she had a UTI )      Diagnosis:  1. MDS with 5q deletion, low risk per IPSS-R    Current Treatment:   1. Aranesp 300 mcg every 2 weeks PRN started 8/1/2022.  2.  Blood transfusions PRN    Treatment History:  1.  See above    Follow-up  Associated symptoms include fatigue. Pertinent negatives include no abdominal pain, arthralgias, chest pain, chills, congestion, coughing, diaphoresis, fever, headaches, numbness, rash, sore throat or weakness.    HPI:  Patient who presented to see me for leukopenia/neutropenia, and macrocytic anemia.  Her anemia dates back to 8/31/2013.  Her macrocytosis started on 8/10/2017, her anemia persisted and she became leukopenic on 6/18/2018.  Her B12 on 4/26/2019 was normal at 476, on 7/1/2019 was 963, and on 7/2/2020 was 1012.  She was referred to hematology for further evaluation.  She presented to see me initially on 8/11/2020.  At that visit, she had vision changes, decreased hearing, dental problems, swelling in her legs, constipation, dark stools, hemorrhoids, urinary frequency/urgency, and some easy bruising.  She denied any fatigue, weight loss, fever, chills, night sweats, chest pain, shortness of breath, cough, trouble swallowing, abdominal pain, nausea, vomiting, diarrhea, hematuria, dysuria, numbness/tingling.  Work-up on that day revealed normal folic acid, ceruloplasmin, and copper.  Her hemoglobin on that day was 9.6.  At that visit, we discussed bone marrow biopsy versus observation, plan was for observation every 3 months.  Patient did not follow up with me after her initial visit.  She was following with her PCP.  Lab work done on 8/9/2021 that revealed a hemoglobin of 7.5, she was admitted to the emergency department, underwent a blood transfusion of 2 units of packed red blood cells, follow-up CBC on  8/23/2021 showed increase in hemoglobin up to 10.5.  She represented to see me on 9/14/2021,  hemoglobin on that day was 9.6. Bone marrow biopsy done on 7/5/2022 showed MDS with 5q deletion.  No increased blast.  Per IPSS-R, patient is low risk.  Started erythropoietin stimulating agent on 08/01/2022.    Interval History:   Patient presents to clinic for a follow up appointment.  She did have a recent hospitalization due to an episode of dizziness.  She was diagnosed with a UTI as well as a TIA.  She was told there was something concerning on her MRI of the brain and that we would discuss the results with her.  She is feeling pretty good today and her fatigue has improved with aranesp.       Past Medical History:   Diagnosis Date    Anemia     CAD (coronary artery disease)     HLD (hyperlipidemia)     Hypertension     Leukopenia     Macrocytic anemia     MDS (myelodysplastic syndrome) with 5q deletion     Neutropenia       Past Surgical History:   Procedure Laterality Date    APPENDECTOMY      BLADDER SUSPENSION      BONE MARROW ASPIRATION N/A 7/5/2022    Procedure: ASPIRATION, BONE MARROW;  Surgeon: Reinier Vences MD;  Location: [facility];  Service: General;  Laterality: N/A;    BONE MARROW BIOPSY  07/05/2022    cataract surgery      CHOLECYSTECTOMY      COLONOSCOPY      CORONARY STENT PLACEMENT      HYSTERECTOMY       Social History     Socioeconomic History    Marital status:    Tobacco Use    Smoking status: Never    Smokeless tobacco: Never   Substance and Sexual Activity    Alcohol use: Not Currently    Drug use: Never      Family History   Family history unknown: Yes      Review of patient's allergies indicates:   Allergen Reactions    Lortab [hydrocodone-acetaminophen] Hives    Codeine Rash    Ranexa [ranolazine] Other (See Comments)     Leg Cramps      Review of Systems   Constitutional:  Positive for fatigue. Negative for appetite change, chills, diaphoresis, fever and unexpected weight change.    HENT:  Negative for nasal congestion, mouth sores, sinus pressure/congestion and sore throat.    Eyes:  Negative for pain and visual disturbance.   Respiratory:  Positive for shortness of breath. Negative for cough and chest tightness.    Cardiovascular:  Negative for chest pain, palpitations and leg swelling.   Gastrointestinal:  Negative for abdominal distention, abdominal pain, blood in stool, constipation and diarrhea.   Genitourinary:  Negative for dysuria, frequency and hematuria.   Musculoskeletal:  Negative for arthralgias and back pain.   Integumentary:  Negative for rash.   Neurological:  Negative for dizziness, weakness, numbness and headaches.   Hematological:  Negative for adenopathy.   Psychiatric/Behavioral:  Negative for confusion. The patient is not nervous/anxious.        Objective:      Physical Exam  Vitals reviewed.   Constitutional:       General: She is awake. She is not in acute distress.     Appearance: Normal appearance. She is well-groomed.   HENT:      Head: Normocephalic.      Right Ear: Decreased hearing noted.      Left Ear: Decreased hearing noted.   Eyes:      General: No scleral icterus.     Extraocular Movements: Extraocular movements intact.   Pulmonary:      Effort: Pulmonary effort is normal.   Abdominal:      General: There is no distension.   Musculoskeletal:         General: No swelling, tenderness or deformity.      Cervical back: Full passive range of motion without pain and neck supple.      Right lower leg: Edema present.      Left lower leg: Edema present.   Skin:     General: Skin is warm and dry.      Coloration: Skin is pale. Skin is not jaundiced.   Neurological:      General: No focal deficit present.      Mental Status: She is alert and oriented to person, place, and time.      Motor: Weakness present.      Gait: Gait is intact.   Psychiatric:         Mood and Affect: Mood normal.         Speech: Speech normal.         Behavior: Behavior normal. Behavior is  luis f.       LABS and imaging REVIEWED IN EPIC          Assessment:     1. MDS with 5q deletion, low risk per IPSS-R      Plan:         At this point, the patient has had a chronic macrocytic anemia and a chronic leukopenia.  These have worsened recently.  Her transfusion requirements are getting closer to each other.    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  Originally the patient was skeptical about bone marrow biopsy, eventually agreed.  She does have MDS with a 5q deletion, would qualify for Revlimid.  She is on erythropoietin stimulating agents, started on 08/01/2022.    Continue MEHUL for now, if she develops symptomatic anemia requiring blood transfusions again, we will consider Revlimid.    Concerning findings on recent MRI of the brain including multiple enhancing bone marrow lesions in the skull raising concern for metastatic disease. Dr. Jeffries spoke with radiologist and these areas appear to be lytic lesions.  Recent bone marrow biopsy was negative for any plasma cells making multiple myeloma unlikely.  She did have a CT of the abdomen and pelvis while inpatient that had no concerning findings to suggest malignancy.  Discussed these findings with the patient and her son, at this time due to her age the patient would prefer to focus on quality of life and does not particularly wish to search for a primary and or initiate treatment for any possible malignancy.     Continue CBC and Aranesp PRN every 2 weeks        Return to clinic in 4 weeks with repeat CBC and CMP      All of their questions were answered to the best of my ability.  They know to call if she has any worsening symptoms before her return visit.    ANETTE OchoaC JASMIN Ochoa

## 2022-09-08 NOTE — TELEPHONE ENCOUNTER
----- Message from Heavenly Merino sent at 9/7/2022  2:35 PM CDT -----  Regarding: call back  .Type:  Needs Medical Advice    Who Called: pt's daughter   Symptoms (please be specific):   How long has patient had these symptoms:    Pharmacy name and phone #:    Would the patient rather a call back or a response via [facility]? Call back  Best Call Back Number: [phone]  Additional Information: pt was discharged from the hospital, and she was told that she needs to see a primary care doctor. The daughter is wondering if Dr. Cameron needs to be seen because her mother is going see a doctor tomorrow.

## 2022-09-29 PROBLEM — L30.9 ECZEMA: Status: ACTIVE | Noted: 2022-01-01

## 2022-09-29 PROBLEM — Z23 NEEDS FLU SHOT: Status: ACTIVE | Noted: 2022-01-01

## 2022-09-29 PROBLEM — R30.0 DYSURIA: Status: ACTIVE | Noted: 2022-01-01

## 2022-09-29 NOTE — PROGRESS NOTES
Subjective:        Patient ID: Edith Alvarado is a 89 y.o. female.    Chief Complaint: Follow-up (Hospital follow up 9/3/22/Patient was admitted due to low RBC and pain in lower stomach area. Patient still having the pain. Patient is requesting breast exam today. Patient also has c/o rash around both ankles. )      Mrs. Venegas presents to the clinic with her daughter Edith for hospital discharge follow up.  She is due for her wellness visit in July    She was inpatient 8/31/22- 9/3/22 for dizziness and uti.  Was given keflex.  Completed course.  Still feeling dysuria. Would like to get repeat ua today.  Pain is not constant.  Like a cramp pain. Happens when laying in bed.  Was bradycardic and consulted cards.  Advised to stop coreg.  Also thought to have had TIA.  Has appt with dr. Guevara. Has appt 12/2022    Also has noted rash on ankles since hospital discharge.  Not using any ointments.  Not itching.     Asking for breast exam.  Did mri head 8/2022 and had lesions on skull concerning for metastatic disease.  Oncology wanted to do breast exam then but patient did not want to do. Reports no problems with her breast.  Would like to do here now     Asking for flu shot today             past history:  The patient has a history of hypertension, hyperlipidemia and CAD which is well controlled on her current medications.  Her cardiologist is Dr. Gao. She is on baby aspirin.  She sees him annually.  Her last appointment was June 2022.  She has follow-up in 1 year. No changes     She has a history of hypothyroidism and is compliant with her Synthroid.     He has a history of anemia and leukopenia.  She is following with Hematology.  They did a bone marrow biopsy on June 5, 2022. She does not know the results yet.  She has an appointment scheduled with them on August 5, 2022.     She has a history of constipation.  She is taking Metamucil.  Not on any stool softeners.  She is having bowel movements about every  other day.     He is mobility impaired.  She uses a rolling walker and has a handicap tag.  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  She sees Dr. Vernon as well as Dr. Martinez.  She lives alone.  She has a Life Alert necklace.  She currently has [facility] health nurse coming once a week.  They draw her blood work at home for hematology.     She has a history of urinary frequency.  She reports also history of bladder prolapse.  She had a partial hysterectomy at age 33 and a bladder lift at that time.  She reports 12 pregnancies and 8 children     She is postmenopausal.  She is osteopenic per her last bone density test June 2018 at [facility].  She is on calcium and vitamin-D.  She declines DEXA      Her mother had lung cancer.  Daughter with breast cancer             Review of Systems   Constitutional: Negative.    HENT: Negative.     Respiratory: Negative.     Cardiovascular: Negative.    Gastrointestinal: Negative.    Genitourinary:  Positive for dysuria and pelvic pain.   Skin:  Positive for rash.       Review of patient's allergies indicates:   Allergen Reactions    Lortab [hydrocodone-acetaminophen] Hives    Codeine Rash    Ranexa [ranolazine] Other (See Comments)     Leg Cramps      Vitals:    09/29/22 1309   BP: 118/74   BP Location: Right arm   Resp: 16   Temp: 97.8 °F (36.6 °C)   Weight: 58.4 kg (128 lb 11.2 oz)      Social History     Socioeconomic History    Marital status:    Tobacco Use    Smoking status: Never    Smokeless tobacco: Never   Substance and Sexual Activity    Alcohol use: Not Currently    Drug use: Never      Family History   Family history unknown: Yes          Objective:     Physical Exam  Vitals and nursing note reviewed.   Constitutional:       Appearance: Normal appearance. She is normal weight.   HENT:      Head: Normocephalic and atraumatic.      Nose: Nose normal.      Mouth/Throat:      Mouth: Mucous  membranes are moist.      Pharynx: Oropharynx is clear.   Eyes:      Extraocular Movements: Extraocular movements intact.   Cardiovascular:      Rate and Rhythm: Normal rate and regular rhythm.      Pulses: Normal pulses.      Heart sounds: Normal heart sounds.   Pulmonary:      Effort: Pulmonary effort is normal.      Breath sounds: Normal breath sounds.   Chest:      Chest wall: No mass.   Breasts:     Right: Tenderness present. No swelling, inverted nipple, mass, nipple discharge or skin change.      Left: Normal. No swelling, inverted nipple, mass, nipple discharge or skin change.          Comments: X= tenderness  Musculoskeletal:         General: Normal range of motion.      Cervical back: Normal range of motion.      Comments: Ambulates with rolling walker   Skin:     General: Skin is warm and dry.      Findings: Rash present. Rash is scaling.          Neurological:      General: No focal deficit present.      Mental Status: She is alert and oriented to person, place, and time. Mental status is at baseline.   Psychiatric:         Mood and Affect: Mood normal.     Current Outpatient Medications on File Prior to Visit   Medication Sig Dispense Refill    aspirin (ECOTRIN) 81 MG EC tablet Take 81 mg by mouth once daily.      calcium carbonate (TUMS) 200 mg calcium (500 mg) chewable tablet Take 1 tablet by mouth once daily.      ergocalciferol (ERGOCALCIFEROL) 50,000 unit Cap Take 5,000 Units by mouth nightly.      isosorbide mononitrate (IMDUR) 60 MG 24 hr tablet Take 60 mg by mouth once daily.      losartan (COZAAR) 100 MG tablet Take 100 mg by mouth once daily.      pravastatin (PRAVACHOL) 20 MG tablet Take 20 mg by mouth once daily.      [DISCONTINUED] CONSTULOSE 10 gram/15 mL solution TAKE 15ML BY MOUTH AS NEEDED FOR CONSTIPATION      levothyroxine (SYNTHROID) 88 MCG tablet Take 1 tablet (88 mcg total) by mouth once daily. 30 tablet 3     No current facility-administered medications on file prior to visit.      Health Maintenance   Topic Date Due    TETANUS VACCINE  Never done    Lipid Panel  08/06/2026      Results for orders placed or performed in visit on 09/16/22   POCT Urinalysis   Result Value Ref Range    Color, UA Pale yellow     Clarity, UA Clear     Glucose, UA Negative     Bilirubin, POC Negative     Ketones, UA Negative     Spec Grav UA 1.015     Blood, UA Negative     pH, UA 5     Protein, POC Negative     Urobilinogen, UA 0.2 mg/dl     Nitrite, UA Negative     Leukocytes, UA Large           Assessment & Plan:     Active Problem List with Overview Notes    Diagnosis Date Noted    Dysuria 09/29/2022    Needs flu shot 09/29/2022    Eczema 09/29/2022    UTI (urinary tract infection) 08/31/2022    Dizziness 08/31/2022    Mobility impaired 07/29/2022    MDS (myelodysplastic syndrome) with 5q deletion 07/22/2022    Encounter for subsequent annual wellness visit (AWV) in Medicare patient 07/20/2022    Advanced care planning/counseling discussion 07/20/2022    Postmenopausal 07/20/2022    Leukopenia     Hypothyroid     Constipation     CAD (coronary artery disease)     HTN (hypertension)     HLD (hyperlipidemia)     Other neutropenia 05/18/2022    Macrocytic anemia 05/18/2022       1. Dysuria  Assessment & Plan:  Will get ua and culture. Encourage fluids. Will call with results when available    Orders:  -     Urinalysis, Reflex to Urine Culture Urine, Clean Catch  -     Urine culture    2. Macrocytic anemia  Assessment & Plan:  Keep appointments with heme/onc. Received 2 [facility] inpatient 8/2022      3. Constipation, unspecified constipation type  Assessment & Plan:  Using prunes and metamucil. Refill of constulose sent in as requested      4. Hypertension, unspecified type  Assessment & Plan:  Well controlled on current meds. Coreg stopped during recent inpatient 8/2022 stay.  On asa. Keep appointments with dr. bustamante      5. Needs flu shot  Assessment & Plan:  Flu shot today    Orders:  -     Influenza (FLUAD)  - Quadrivalent (Adjuvanted) *Preferred* (65+) (PF)    6. Eczema, unspecified type  Assessment & Plan:  Triamcinolone cream sent in. Use as directed. Contact clinic for concerns.      7. Mobility impaired  Assessment & Plan:  Has life alert. Uses rolling walker. Has handicap tag.       Other orders  -     CONSTULOSE 10 gram/15 mL solution  -     triamcinolone acetonide 0.1% (KENALOG) 0.1 % cream       Keep scheduled wellness 7/2023

## 2022-09-29 NOTE — ASSESSMENT & PLAN NOTE
Well controlled on current meds. Coreg stopped during recent inpatient 8/2022 stay.  On asa. Keep appointments with dr. bustamante

## 2022-10-04 NOTE — PROGRESS NOTES
Please inform patient of results.    1. Ua showed possible infection and crystals.  Urine culture is pending. Will call with results when available. Encourage fluids. Contact clinic for concerns.

## 2022-10-05 NOTE — PROGRESS NOTES
Subjective:       Patient ID: Edith Alvarado is a 89 y.o. female.    Chief Complaint: Follow-up (4 week. Patient stated no new problems )      Diagnosis:  1. MDS with 5q deletion, low risk per IPSS-R    Current Treatment:   1. Aranesp 300 mcg every 2 weeks PRN started 8/1/2022.  2.  Blood transfusions PRN    Treatment History:  1.  See above    Follow-up  Associated symptoms include fatigue. Pertinent negatives include no abdominal pain, arthralgias, chest pain, chills, congestion, coughing, diaphoresis, fever, headaches, numbness, rash, sore throat or weakness.      HPI:  Patient who presented to see me for leukopenia/neutropenia, and macrocytic anemia.  Her anemia dates back to 8/31/2013.  Her macrocytosis started on 8/10/2017, her anemia persisted and she became leukopenic on 6/18/2018.  Her B12 on 4/26/2019 was normal at 476, on 7/1/2019 was 963, and on 7/2/2020 was 1012.  She was referred to hematology for further evaluation.  She presented to see me initially on 8/11/2020.  At that visit, she had vision changes, decreased hearing, dental problems, swelling in her legs, constipation, dark stools, hemorrhoids, urinary frequency/urgency, and some easy bruising.  She denied any fatigue, weight loss, fever, chills, night sweats, chest pain, shortness of breath, cough, trouble swallowing, abdominal pain, nausea, vomiting, diarrhea, hematuria, dysuria, numbness/tingling.  Work-up on that day revealed normal folic acid, ceruloplasmin, and copper.  Her hemoglobin on that day was 9.6.  At that visit, we discussed bone marrow biopsy versus observation, plan was for observation every 3 months.  Patient did not follow up with me after her initial visit.  She was following with her PCP.  Lab work done on 8/9/2021 that revealed a hemoglobin of 7.5, she was admitted to the emergency department, underwent a blood transfusion of 2 units of packed red blood cells, follow-up CBC on 8/23/2021 showed increase in hemoglobin  up to 10.5.  She represented to see me on 9/14/2021,  hemoglobin on that day was 9.6. Bone marrow biopsy done on 7/5/2022 showed MDS with 5q deletion.  No increased blast.  Per IPSS-R, patient is low risk.  Started erythropoietin stimulating agent on 08/01/2022.  CT scan of the abdomen and pelvis on 08/31/2022 showed right-sided hydroureter with no associated hydro nephrosis there were no renal or ureteral masses seen.  There were chronic changes in the bilateral lung bases with a small inguinal hernia.  CT of the head without contrast on 08/31/2022 showed a small hypodensity in the left cerebellum which may represent ischemic changes.  An MRI was recommended, this was done on 09/01/2022, MRI showed likely subacute ischemic changes in the left cerebellum with multiple enhancing bone marrow lesions in the skull raising concern for metastatic disease.    Interval History:   Patient presents to clinic for a follow up appointment.  Overall, she did have a UTI recently, and since treating that, she is felt much better.  She has no major issues to discuss.  She states that for the past couple of weeks, she has felt fantastic.      Past Medical History:   Diagnosis Date    Anemia     CAD (coronary artery disease)     HLD (hyperlipidemia)     Leukopenia     Macrocytic anemia     MDS (myelodysplastic syndrome) with 5q deletion     Neutropenia       Past Surgical History:   Procedure Laterality Date    APPENDECTOMY      BLADDER SUSPENSION      BONE MARROW ASPIRATION N/A 7/5/2022    Procedure: ASPIRATION, BONE MARROW;  Surgeon: Reinier Vences MD;  Location: [facility];  Service: General;  Laterality: N/A;    BONE MARROW BIOPSY  07/05/2022    cataract surgery      CHOLECYSTECTOMY      COLONOSCOPY      CORONARY STENT PLACEMENT      HYSTERECTOMY       Social History     Socioeconomic History    Marital status:    Tobacco Use    Smoking status: Never    Smokeless tobacco: Never   Substance and Sexual Activity    Alcohol  use: Not Currently    Drug use: Never      Family History   Family history unknown: Yes      Review of patient's allergies indicates:   Allergen Reactions    Lortab [hydrocodone-acetaminophen] Hives    Codeine Rash    Ranexa [ranolazine] Other (See Comments)     Leg Cramps      Review of Systems   Constitutional:  Positive for fatigue. Negative for appetite change, chills, diaphoresis, fever and unexpected weight change.   HENT:  Negative for nasal congestion, mouth sores, sinus pressure/congestion and sore throat.    Eyes:  Negative for pain and visual disturbance.   Respiratory:  Positive for shortness of breath. Negative for cough and chest tightness.    Cardiovascular:  Negative for chest pain, palpitations and leg swelling.   Gastrointestinal:  Negative for abdominal distention, abdominal pain, blood in stool, constipation and diarrhea.   Genitourinary:  Negative for dysuria, frequency and hematuria.   Musculoskeletal:  Negative for arthralgias and back pain.   Integumentary:  Negative for rash.   Neurological:  Negative for dizziness, weakness, numbness and headaches.   Hematological:  Negative for adenopathy.   Psychiatric/Behavioral:  Negative for confusion. The patient is not nervous/anxious.        Objective:      Physical Exam  Vitals reviewed.   Constitutional:       General: She is awake. She is not in acute distress.     Appearance: Normal appearance. She is well-groomed.   HENT:      Head: Normocephalic.      Right Ear: Decreased hearing noted.      Left Ear: Decreased hearing noted.   Eyes:      General: No scleral icterus.     Extraocular Movements: Extraocular movements intact.   Pulmonary:      Effort: Pulmonary effort is normal.   Abdominal:      General: There is no distension.   Musculoskeletal:         General: No swelling, tenderness or deformity.      Cervical back: Full passive range of motion without pain and neck supple.      Right lower leg: Edema present.      Left lower leg: Edema  present.   Skin:     General: Skin is warm and dry.      Coloration: Skin is pale. Skin is not jaundiced.   Neurological:      General: No focal deficit present.      Mental Status: She is alert and oriented to person, place, and time.      Motor: Weakness present.      Gait: Gait is intact.   Psychiatric:         Mood and Affect: Mood normal.         Speech: Speech normal.         Behavior: Behavior normal. Behavior is cooperative.       LABS and imaging REVIEWED IN EPIC          Assessment:     1. MDS with 5q deletion, low risk per IPSS-R      Plan:         At this point, the patient has had a chronic macrocytic anemia and a chronic leukopenia.  These have worsened recently.  Her transfusion requirements are getting closer to each other.    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  Originally the patient was skeptical about bone marrow biopsy, eventually agreed.  She does have MDS with a 5q deletion, would qualify for Revlimid.  She is on erythropoietin stimulating agents, started on 08/01/2022.    Continue MEHUL for now, if she develops symptomatic anemia requiring blood transfusions again, we will consider Revlimid.    Concerning findings on recent MRI of the brain including multiple enhancing bone marrow lesions in the skull raising concern for metastatic disease. Dr. Jeffries spoke with radiologist and these areas appear to be lytic lesions.  Recent bone marrow biopsy was negative for any plasma cells making multiple myeloma unlikely.  She did have a CT of the abdomen and pelvis while inpatient that had no concerning findings to suggest malignancy.  Discussed these findings with the patient and her son, at this time due to her age the patient would prefer to focus on quality of life and does not particularly wish to search for a primary and or initiate treatment for any possible malignancy.     Continue CBC and Aranesp PRN every 2 weeks    Return to clinic in 4 weeks  with repeat CBC and CMP      All of their questions were answered to the best of my ability.  They know to call if she has any worsening symptoms before her return visit.    Jeremiah Jeffries II, MD

## 2022-10-20 NOTE — TELEPHONE ENCOUNTER
Spoke with patient's son, Sirena. He is stating that patient feels as though she needs to be transfused. She is weak and is experiencing sob with minimal exertion. Not due to come in for labs and injection until Monday. He was wondering if she could be drawn here or by home health. Please advise.

## 2022-10-24 PROBLEM — Z00.00 ENCOUNTER FOR SUBSEQUENT ANNUAL WELLNESS VISIT (AWV) IN MEDICARE PATIENT: Status: RESOLVED | Noted: 2022-01-01 | Resolved: 2022-01-01

## 2022-10-25 NOTE — PLAN OF CARE
Transfusion of one unit PRBC's. Tolerated treatment without adverse events. Discharged in stable condition.

## 2022-11-07 NOTE — PROGRESS NOTES
Subjective:       Patient ID: Edith Alvarado is a 89 y.o. female.    Chief Complaint: Follow-up ( Patient stated that she did have severe left leg pain about two weeks ago and memory seems to be worsening. Patient also stated that she felt weird yesterday but couldn't explain the feeling )      Diagnosis:  1. 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 daily on days 1 through 21 out of every 28 days.  Blood transfusions PRN    Treatment History:  Aranesp 300 mcg every 2 weeks PRN started 8/1/2022.    Follow-up  Associated symptoms include fatigue. Pertinent negatives include no abdominal pain, arthralgias, chest pain, chills, congestion, coughing, diaphoresis, fever, headaches, numbness, rash, sore throat or weakness.      HPI:  Patient who presented to see me for leukopenia/neutropenia, and macrocytic anemia.  Her anemia dates back to 8/31/2013.  Her macrocytosis started on 8/10/2017, her anemia persisted and she became leukopenic on 6/18/2018.  Her B12 on 4/26/2019 was normal at 476, on 7/1/2019 was 963, and on 7/2/2020 was 1012.  She was referred to hematology for further evaluation.  She presented to see me initially on 8/11/2020.  At that visit, she had vision changes, decreased hearing, dental problems, swelling in her legs, constipation, dark stools, hemorrhoids, urinary frequency/urgency, and some easy bruising.  She denied any fatigue, weight loss, fever, chills, night sweats, chest pain, shortness of breath, cough, trouble swallowing, abdominal pain, nausea, vomiting, diarrhea, hematuria, dysuria, numbness/tingling.  Work-up on that day revealed normal folic acid, ceruloplasmin, and copper.  Her hemoglobin on that day was 9.6.  At that visit, we discussed bone marrow biopsy versus observation, plan was for observation every 3 months.  Patient did not follow up with me after her initial visit.  She was following with her PCP.  Lab work done on 8/9/2021 that  revealed a hemoglobin of 7.5, she was admitted to the emergency department, underwent a blood transfusion of 2 units of packed red blood cells, follow-up CBC on 8/23/2021 showed increase in hemoglobin up to 10.5.  She represented to see me on 9/14/2021,  hemoglobin on that day was 9.6. Bone marrow biopsy done on 7/5/2022 showed MDS with 5q deletion.  No increased blast.  Per IPSS-R, patient is low risk.  Started erythropoietin stimulating agent on 08/01/2022.  CT scan of the abdomen and pelvis on 08/31/2022 showed right-sided hydroureter with no associated hydro nephrosis there were no renal or ureteral masses seen.  There were chronic changes in the bilateral lung bases with a small inguinal hernia.  CT of the head without contrast on 08/31/2022 showed a small hypodensity in the left cerebellum which may represent ischemic changes.  An MRI was recommended, this was done on 09/01/2022, MRI showed likely subacute ischemic changes in the left cerebellum with multiple enhancing bone marrow lesions in the skull raising concern for metastatic disease.  Ordered Revlimid 5 mg p.o. daily on days 1 through 21 out of every 28 days on 11/07/2022.    Interval History:   Patient presents to clinic for a follow up appointment.  She and her family are looking into getting extra help, wanting palliative care.  Since I saw her, she did have an episode of extreme weakness.    Past Medical History:   Diagnosis Date    Anemia     CAD (coronary artery disease)     HLD (hyperlipidemia)     Leukopenia     Macrocytic anemia     MDS (myelodysplastic syndrome) with 5q deletion     Neutropenia       Past Surgical History:   Procedure Laterality Date    APPENDECTOMY      BLADDER SUSPENSION      BONE MARROW ASPIRATION N/A 07/05/2022    Procedure: ASPIRATION, BONE MARROW;  Surgeon: Reinier Vences MD;  Location: [facility];  Service: General;  Laterality: N/A;    BONE MARROW BIOPSY  07/05/2022    cataract surgery      CHOLECYSTECTOMY       COLONOSCOPY      CORONARY STENT PLACEMENT      HYSTERECTOMY       Social History     Socioeconomic History    Marital status:    Tobacco Use    Smoking status: Never    Smokeless tobacco: Never   Substance and Sexual Activity    Alcohol use: Not Currently    Drug use: Never    Sexual activity: Not Currently     Partners: Female      Family History   Problem Relation Age of Onset    Hearing loss Mother     Hypertension Mother     Vision loss Mother     Cancer Daughter     COPD Daughter     Mental illness Daughter       Review of patient's allergies indicates:   Allergen Reactions    Lortab [hydrocodone-acetaminophen] Hives    Codeine Rash    Ranexa [ranolazine] Other (See Comments)     Leg Cramps      Review of Systems   Constitutional:  Positive for fatigue. Negative for appetite change, chills, diaphoresis, fever and unexpected weight change.   HENT:  Negative for nasal congestion, mouth sores, sinus pressure/congestion and sore throat.    Eyes:  Negative for pain and visual disturbance.   Respiratory:  Positive for shortness of breath. Negative for cough and chest tightness.    Cardiovascular:  Negative for chest pain, palpitations and leg swelling.   Gastrointestinal:  Negative for abdominal distention, abdominal pain, blood in stool, constipation and diarrhea.   Genitourinary:  Negative for dysuria, frequency and hematuria.   Musculoskeletal:  Negative for arthralgias and back pain.   Integumentary:  Negative for rash.   Neurological:  Negative for dizziness, weakness, numbness and headaches.   Hematological:  Negative for adenopathy.   Psychiatric/Behavioral:  Negative for confusion. The patient is not nervous/anxious.        Objective:      Physical Exam  Vitals reviewed.   Constitutional:       General: She is awake. She is not in acute distress.     Appearance: Normal appearance. She is well-groomed.   HENT:      Head: Normocephalic.      Right Ear: Decreased hearing noted.      Left Ear: Decreased  hearing noted.   Eyes:      General: No scleral icterus.     Extraocular Movements: Extraocular movements intact.   Pulmonary:      Effort: Pulmonary effort is normal.   Abdominal:      General: There is no distension.   Musculoskeletal:         General: No swelling, tenderness or deformity.      Cervical back: Full passive range of motion without pain and neck supple.      Right lower leg: Edema present.      Left lower leg: Edema present.   Skin:     General: Skin is warm and dry.      Coloration: Skin is pale. Skin is not jaundiced.   Neurological:      General: No focal deficit present.      Mental Status: She is alert and oriented to person, place, and time.      Motor: Weakness present.      Gait: Gait is intact.   Psychiatric:         Mood and Affect: Mood normal.         Speech: Speech normal.         Behavior: Behavior normal. Behavior is cooperative.       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  These have worsened recently.  Her transfusion requirements are getting closer to each other.    She was treated with erythropoietin stimulating agent, however she still has symptomatic anemia and requires blood transfusions, we will attempt Revlimid at a lower dose of 5 mg p.o. daily on days 1 through 21 out of every 28 days due to her 5q deletion.    Concerning findings on recent MRI of the brain including multiple enhancing bone marrow lesions in the skull raising concern for metastatic disease. I spoke with radiologist and these areas appear to be lytic lesions.  Recent bone marrow biopsy was negative for any plasma cells making multiple myeloma unlikely.  She did have a CT of the abdomen and pelvis while inpatient that had no concerning findings to suggest malignancy.  Discussed these findings with the patient and her son, at this time due to  her age the patient would prefer to focus on quality of life and does not particularly wish to search for a primary and or initiate treatment for any possible malignancy.     Continue every other week labs and return to clinic to see me in 4 weeks with repeat CBC and CMP.  Revlimid ordered today.    All of their questions were answered to the best of my ability.  They know to call if she has any worsening symptoms before her return visit.    Jeremiah Jeffries II, MD

## 2022-11-07 NOTE — TELEPHONE ENCOUNTER
PA approved from 10/8/2022 to 11/7/2023. [facility] out of network to dispense Revlimid. [facility] is in network with patient's insurance. Will send Rx there.    [facility] phone # [phone]. Patient is aware and will send a portal message as well with this info.    Closing out [facility] Encounter

## 2022-11-07 NOTE — PLAN OF CARE
START ON PATHWAY REGIMEN - MDS    [id number]        Lenalidomide (Revlimid)           Additional Orders: Thromboprophylaxis recommended with lenalidomide. See   PI for details.    **Always confirm dose/schedule in your pharmacy ordering system**    Patient Characteristics:  Del(5q), First Line, Symptomatic Anemia  WHO Disease Classification: MDS with isolated del(5q)  Line of Therapy: First Line  Disease Characteristics: Symptomatic Anemia  Intent of Therapy:  Non-Curative / Palliative Intent, Discussed with Patient

## 2022-11-08 NOTE — TELEPHONE ENCOUNTER
[facility] requesting [employer] authorization number for revlimid rx that was sent yesterday.

## 2022-11-09 NOTE — TELEPHONE ENCOUNTER
Spoke with pt's daughter, Edith, about helping apply for financial assistance. HH size and income was provided and was given permission to assist with financial assistance.

## 2022-11-09 NOTE — TELEPHONE ENCOUNTER
Patient approved for [facility] with effective dates 10/10/2022 - 10/09/2023.     For documentation purposes:    BIN: [id number]  PCN: [id number]  ID: [id number]  Group: [id number]  Maximum jose amount: $10,000    Will call [facility] to give them this information

## 2022-11-09 NOTE — TELEPHONE ENCOUNTER
Patient unable to fill revlimid rx. Cost will be 3,000 out of pocket. She is asking for help with financial assistance. Patient's daughter, Edith, can be reached @ [phone].

## 2022-11-11 NOTE — TELEPHONE ENCOUNTER
[facility] received prescription for revlimid but [employer] has flagged patient's survey. They are asking that our office contact [employer]. They are unable to dispense med until issue is resolved.

## 2022-11-11 NOTE — ED PROVIDER NOTES
Encounter Date: 11/11/2022       History     Chief Complaint   Patient presents with    Fall     89-year-old female presents with left elbow pain status post trip and fall just prior to arrival.  Patient tripped over a brick on the floor falling hitting her left elbow.  She also has a superficial skin avulsion to the left lateral forearm.  No active bleeding.  Denies hitting her head.  No LOC. No blood thinners.  She is only complaining of mild left elbow pain worse with movement.  No other complaints.    Review of patient's allergies indicates:   Allergen Reactions    Lortab [hydrocodone-acetaminophen] Hives    Codeine Rash    Ranexa [ranolazine] Other (See Comments)     Leg Cramps     Past Medical History:   Diagnosis Date    Anemia     CAD (coronary artery disease)     HLD (hyperlipidemia)     Hypertension     Leukopenia     Macrocytic anemia     MDS (myelodysplastic syndrome) with 5q deletion     Neutropenia      Past Surgical History:   Procedure Laterality Date    APPENDECTOMY      BLADDER SUSPENSION      BONE MARROW ASPIRATION N/A 07/05/2022    Procedure: ASPIRATION, BONE MARROW;  Surgeon: Reinier Vences MD;  Location: [facility];  Service: General;  Laterality: N/A;    BONE MARROW BIOPSY  07/05/2022    cataract surgery      CHOLECYSTECTOMY      COLONOSCOPY      CORONARY STENT PLACEMENT      HYSTERECTOMY       Family History   Problem Relation Age of Onset    Hearing loss Mother     Hypertension Mother     Vision loss Mother     Cancer Daughter     COPD Daughter     Mental illness Daughter      Social History     Tobacco Use    Smoking status: Never    Smokeless tobacco: Never   Substance Use Topics    Alcohol use: Not Currently    Drug use: Never     Review of Systems   Constitutional: Negative.    HENT: Negative.     Eyes: Negative.    Respiratory: Negative.     Cardiovascular: Negative.    Gastrointestinal: Negative.    Endocrine: Negative.    Genitourinary: Negative.    Musculoskeletal:  Positive for  arthralgias.   Skin:  Positive for wound.   Allergic/Immunologic: Negative.    Neurological: Negative.    Hematological: Negative.    Psychiatric/Behavioral: Negative.       Physical Exam     Initial Vitals [11/11/22 1031]   BP Pulse Resp Temp SpO2   137/69 75 20 97.7 °F (36.5 °C) 100 %      MAP       --         Physical Exam    Nursing note and vitals reviewed.  Constitutional: She appears well-developed and well-nourished.   HENT:   Head: Normocephalic and atraumatic.   No signs of trauma to the head or neck.   Eyes: Conjunctivae and EOM are normal. Pupils are equal, round, and reactive to light.   Neck: Neck supple.   Normal range of motion.  Cardiovascular:  Normal rate, regular rhythm and intact distal pulses.           Pulmonary/Chest: Breath sounds normal.   Abdominal: Abdomen is soft. Bowel sounds are normal.   Musculoskeletal:      Cervical back: Normal range of motion and neck supple.      Comments: Left elbow-mild swelling over the lateral aspect.  No obvious deformity.  Mildly tender to palpation. LROM secondary to pain.  Left forearm-there is a 2.5 x 1 cm superficial skin avulsion over the lateral forearm.  No active bleeding.  Clean.  Nothing to suture.     Neurological: She is alert and oriented to person, place, and time. She has normal strength. GCS score is 15. GCS eye subscore is 4. GCS verbal subscore is 5. GCS motor subscore is 6.   Skin: Skin is warm. Capillary refill takes less than 2 seconds.   Psychiatric: She has a normal mood and affect.       ED Course   Procedures  Labs Reviewed - No data to display       Imaging Results              X-Ray Elbow Complete Left (Final result)  Result time 11/11/22 11:35:53      Final result by Maddie Lux MD (11/11/22 11:35:53)                   Impression:      No acute bony abnormality      Electronically signed by: Maddie Lux  Date:    11/11/2022  Time:    11:35               Narrative:    EXAMINATION:  XR ELBOW COMPLETE 3 VIEW  LEFT    CLINICAL HISTORY:  Unspecified fall, initial encounter    COMPARISON:  None.    FINDINGS:  There is no acute fracture or malalignment.  There is no elbow joint effusion.  The soft tissues are unremarkable.                                       X-Ray Forearm Left (Final result)  Result time 11/11/22 11:58:39      Final result by Maddie Lux MD (11/11/22 11:58:39)                   Impression:      No acute bony abnormality      Electronically signed by: Maddie Lux  Date:    11/11/2022  Time:    11:58               Narrative:    EXAMINATION:  XR FOREARM LEFT    CLINICAL HISTORY:  Unspecified fall, initial encounter    COMPARISON:  None.    FINDINGS:  There is no acute fracture or malalignment.  The soft tissues are unremarkable.                                       Medications   acetaminophen tablet 650 mg (650 mg Oral Given 11/11/22 1101)     Medical Decision Making:   Clinical Tests:   Radiological Study: Ordered and Reviewed  ED Management:  Wound dressed by nurse.  Patient placed in a sling.  Encouraged Tylenol Motrin and ice.  Call follow-up with your PCP Dr. Cameron as soon as possible for further evaluation.  Strict return to ER precautions given, patient/family voiced understanding.                        Clinical Impression:   Final diagnoses:  [W19.XXXA] Fall  [S50.02XA] Contusion of left elbow, initial encounter (Primary)  [S51.802A] Avulsion of skin of forearm, left, initial encounter        ED Disposition Condition    Discharge Stable          ED Prescriptions    None       Follow-up Information       Follow up With Specialties Details Why Contact Info    Tiffany Cameron MD Family Medicine   [address] [place] [address]  [address]  [facility] [address]  [phone]               Dallas Garcia MD  11/11/22 2250

## 2022-11-11 NOTE — ED TRIAGE NOTES
C/o left elbow pain after fall this am. Unsure is she hit her head but denies loc. Small abrasion to left forearm.

## 2022-11-14 PROBLEM — S51.802A: Status: ACTIVE | Noted: 2022-01-01

## 2022-11-14 PROBLEM — M25.522 LEFT ELBOW PAIN: Status: ACTIVE | Noted: 2022-01-01

## 2022-11-14 NOTE — TELEPHONE ENCOUNTER
Spoke with Nurse Nelson with [facility].   Provided verbal for at home wound care.

## 2022-11-14 NOTE — ASSESSMENT & PLAN NOTE
Due to limited ROM and pain level, will refer to Ortho for evaluation  Appt made while in office with Dr. Peter Celestin on today for evaluation  Rx Ibuprofen 600 mg Q 8 hour prn; no other NSAID; verbalizes understanding; may alternate with tylenol  F/U with PCP if no improvement  Verbalizes understanding

## 2022-11-14 NOTE — TELEPHONE ENCOUNTER
----- Message from Arely Lopez LPN sent at 11/14/2022  8:58 AM CST -----  Would you like for patient to make apt with you or juanita? Pharmacy updated in chart   ----- Message -----  From: Gui Jackson  Sent: 11/14/2022   8:50 AM CST  To: Nisha CARRION Staff    .Type:  Needs Medical Advice    Who Called: Edith her daughter same name.    Symptoms (please be specific):    How long has patient had these symptoms:    Pharmacy name and phone #:  [facility]  Would the patient rather a call back or a response via [facility]?   Best Call Back Number: [phone]  Additional Information: She took a fall on Friday bruised her left elbow and is in a lot of pain. Her daughter called and would like to see if there is something stronger that she can take for pain. She went to the ER on Friday and they told her mom to take tylenol.  Please give her daughter a call back to see if something can be called in for her.

## 2022-11-14 NOTE — Clinical Note
Diagnosis: Left elbow pain [242451]   Admitting Provider:: COREEN NICHOLS [[id number]]   Future Attending Provider: COREEN NICHOLS [[id number]]   Reason for IP Medical Treatment  (Clinical interventions that can only be accomplished in the IP setting? ) :: ortho surgery   Estimated Length of Stay:: 2 midnights   I certify that Inpatient services for greater than or equal to 2 midnights are medically necessary:: Yes   Plans for Post-Acute care--if anticipated (pick the single best option):: C. Discharge home with home health services

## 2022-11-14 NOTE — PROGRESS NOTES
Chief Complaint:  Left elbow injury    Consulting Physician: No ref. provider found    History of present illness:    Patient is a 89-year-old female who presents for initial evaluation of a left elbow injury that she sustained on 11/11/2022.  She went to an outside emergency room where she was told she had no fractures and to follow-up.  She has pain around the left elbow as well as an area of broken skin distally in the forearm.  Overall his pain is controlled.  Pain is worse with motion.  It is localized to the ulnar humeral joint.  She denies any numbness or tingling radiating to the fingertips.  She is been comfortable in his sling.    Past Medical History:   Diagnosis Date    Anemia     CAD (coronary artery disease)     Eczema 9/29/2022    HLD (hyperlipidemia)     HTN (hypertension)     Hypertension     Hypothyroid     Leukopenia     Macrocytic anemia     MDS (myelodysplastic syndrome) with 5q deletion     Neutropenia     Other neutropenia 5/18/2022       Past Surgical History:   Procedure Laterality Date    APPENDECTOMY      BLADDER SUSPENSION      BONE MARROW ASPIRATION N/A 07/05/2022    Procedure: ASPIRATION, BONE MARROW;  Surgeon: Reinier Vences MD;  Location: [facility];  Service: General;  Laterality: N/A;    BONE MARROW BIOPSY  07/05/2022    cataract surgery      CHOLECYSTECTOMY      COLONOSCOPY      CORONARY STENT PLACEMENT      HYSTERECTOMY         Current Outpatient Medications   Medication Sig    aspirin (ECOTRIN) 81 MG EC tablet Take 81 mg by mouth once daily.    calcium carbonate (TUMS) 200 mg calcium (500 mg) chewable tablet Take 1 tablet by mouth once daily.    cholecalciferol, vitamin D3, (D3-5000 ORAL) TAKE ONE TABLET BY MOUTH EVERY NIGHT AT BEDTIME.    CONSTULOSE 10 gram/15 mL solution Take 15 mLs (10 g total) by mouth every evening. As needed for constipation    ergocalciferol (ERGOCALCIFEROL) 50,000 unit Cap Take 5,000 Units by mouth nightly.    ibuprofen (ADVIL,MOTRIN) 600 MG tablet Take 1  tablet (600 mg total) by mouth every 8 (eight) hours as needed for Pain. Take with food    isosorbide mononitrate (IMDUR) 60 MG 24 hr tablet Take 60 mg by mouth once daily.    lenalidomide 5 mg Cap Take 5 mg by mouth once daily on days 1-21 of each 28 day cycle. [employer] authorization.    losartan (COZAAR) 100 MG tablet Take 100 mg by mouth once daily.    mupirocin (BACTROBAN) 2 % ointment Apply topically 3 (three) times daily.    pravastatin (PRAVACHOL) 20 MG tablet Take 20 mg by mouth once daily.    levothyroxine (SYNTHROID) 88 MCG tablet Take 1 tablet (88 mcg total) by mouth once daily.    triamcinolone acetonide 0.1% (KENALOG) 0.1 % cream Apply topically 2 (two) times daily. for 7 days     No current facility-administered medications for this visit.       Review of patient's allergies indicates:   Allergen Reactions    Lortab [hydrocodone-acetaminophen] Hives    Codeine Rash    Ranexa [ranolazine] Other (See Comments)     Leg Cramps       Family History   Problem Relation Age of Onset    Hearing loss Mother     Hypertension Mother     Vision loss Mother     Cancer Daughter     COPD Daughter     Mental illness Daughter        Social History     Socioeconomic History    Marital status:    Tobacco Use    Smoking status: Never    Smokeless tobacco: Never   Substance and Sexual Activity    Alcohol use: Not Currently    Drug use: Never    Sexual activity: Not Currently     Partners: Female       Review of Systems:    Constitution:   Denies chills, fever, and sweats.  HENT:   Denies headaches or blurry vision.  Cardiovascular:  Denies chest pain or irregular heart beat.  Respiratory:   Denies cough or shortness of breath.  Gastrointestinal:  Denies abdominal pain, nausea, or vomiting.  Musculoskeletal:   Denies muscle cramps.  Neurological:   Denies dizziness or focal weakness.  Psychiatric/Behavior: Normal mental status.  Hematology/Lymph:  Denies bleeding problem or easy bruising/bleeding.  Skin:    Denies rash  "or suspicious lesions.    Examination:    Vital Signs:    Vitals:    11/14/22 1554 11/14/22 1555   Weight: 57.6 kg (127 lb)    Height: 5' 2.5" (1.588 m)    PainSc:  10-Worst pain ever       Body mass index is 22.86 kg/m².    Constitution:   Well-developed, well nourished patient in no acute distress.  Neurological:   Alert and oriented x 3 and cooperative to examination.     Psychiatric/Behavior: Normal mental status.  Respiratory:   No shortness of breath.  Eyes:    Extraoccular muscles intact  Skin:    No scars, rash or suspicious lesions.    MSK:   Left elbow:  No open wounds but there is bruising over the medial and lateral elbow.  There is a traumatic wound over the distal forearm which is covered in bandaged.  No evidence of infection or cellulitis.  She has an effusion over the elbow with mild tenderness to palpation mostly on the lateral side.  Range of motion is 50 to 120 without any pain.  She has pain once this motion is exceeded.  She is full 80-80 pronation supination without any pain.  There is no catching or locking pronation or supination.  She is full range of motion of the wrist and digits with no tenderness to palpation around the wrist or digits.  She is neurovascularly intact.  Motor is intact AIN, PIN, ulnar distribution.  Sensation light touch intact in median, ulnar, radial distribution.  Radial pulses 2+ her hand is warm well perfused    Imaging:   X-ray left elbow shows degenerative changes throughout the elbow but no obvious fracture or dislocation     Assessment:  Left elbow contusion    Plan:  I do not see any obvious fracture or dislocation.  She can wear the sling for comfort.  I recommended coming out of the sling several times a day to start ranging her elbow.  She can try this for 3-4 weeks and I can see her back in a month to check her progress.    Follow Up:  1 month  Xray at next visit:  Left elbow      "

## 2022-11-14 NOTE — PROGRESS NOTES
Subjective:      Patient ID: Edith Alvarado is a 89 y.o. White female      Chief Complaint: Had a fall on 11/11/2022, request pain medication       Past Medical History:   Diagnosis Date    Anemia     CAD (coronary artery disease)     Eczema 9/29/2022    HLD (hyperlipidemia)     HTN (hypertension)     Hypertension     Hypothyroid     Leukopenia     Macrocytic anemia     MDS (myelodysplastic syndrome) with 5q deletion     Neutropenia     Other neutropenia 5/18/2022        HPI  Presents to clinic for ED follow up.    Seen in ED with complaints of elbow pain after trip and fall.  Pt tripped over brick and fell onto floor hitting elbow.  Pt was noted to have superficial skin avulsion of left lateral elbow.  XR elbow and forearm without any bony abnormalities.  Continues to have pain (10/10), with inability to extend elbow due to pain.  Currently alternating tylenol and motrin with minimal relief; patient is awakening at nighttime in tears.  Desires pain medication.      Of note, patient currently has home health with [facility] also known as [facility] .  Will contact  in order to add wound care.            Review of Systems   Constitutional: Negative.  Negative for appetite change, chills and fever.   HENT: Negative.     Eyes: Negative.  Negative for discharge and redness.   Respiratory: Negative.  Negative for shortness of breath.    Cardiovascular: Negative.  Negative for chest pain.   Gastrointestinal: Negative.    Endocrine: Negative.    Genitourinary: Negative.    Musculoskeletal:         Forearm pain   Integumentary:  Positive for wound.   Allergic/Immunologic: Negative.    Neurological: Negative.    Psychiatric/Behavioral: Negative.     All other systems reviewed and are negative.       Objective:      Vitals:    11/14/22 1506   BP: (!) 91/54   Pulse: 78   Resp: 20   Temp: 98 °F (36.7 °C)      Body mass index is 23.34 kg/m².     Physical Exam  Vitals reviewed.   Constitutional:       Appearance: Normal  appearance.   HENT:      Head: Normocephalic.      Mouth/Throat:      Mouth: Mucous membranes are moist.   Eyes:      Conjunctiva/sclera: Conjunctivae normal.      Pupils: Pupils are equal, round, and reactive to light.   Cardiovascular:      Rate and Rhythm: Normal rate and regular rhythm.   Pulmonary:      Effort: Pulmonary effort is normal. No respiratory distress.      Breath sounds: Normal breath sounds.   Musculoskeletal:         General: Normal range of motion.      Cervical back: Normal range of motion and neck supple.   Skin:     General: Skin is warm and dry.      Comments: Left elbow-mild swelling over the lateral aspect.  TTP. Limited ROM   Left forearm-there is a 2.5 x 1 cm skin tear    Neurological:      Mental Status: She is alert and oriented to person, place, and time.   Psychiatric:         Mood and Affect: Mood normal.          Current Outpatient Medications:     aspirin (ECOTRIN) 81 MG EC tablet, Take 81 mg by mouth once daily., Disp: , Rfl:     calcium carbonate (TUMS) 200 mg calcium (500 mg) chewable tablet, Take 1 tablet by mouth once daily., Disp: , Rfl:     cholecalciferol, vitamin D3, (D3-5000 ORAL), TAKE ONE TABLET BY MOUTH EVERY NIGHT AT BEDTIME., Disp: , Rfl:     CONSTULOSE 10 gram/15 mL solution, Take 15 mLs (10 g total) by mouth every evening. As needed for constipation, Disp: 1892 mL, Rfl: 2    ergocalciferol (ERGOCALCIFEROL) 50,000 unit Cap, Take 5,000 Units by mouth nightly., Disp: , Rfl:     isosorbide mononitrate (IMDUR) 60 MG 24 hr tablet, Take 60 mg by mouth once daily., Disp: , Rfl:     losartan (COZAAR) 100 MG tablet, Take 100 mg by mouth once daily., Disp: , Rfl:     pravastatin (PRAVACHOL) 20 MG tablet, Take 20 mg by mouth once daily., Disp: , Rfl:     ibuprofen (ADVIL,MOTRIN) 600 MG tablet, Take 1 tablet (600 mg total) by mouth every 8 (eight) hours as needed for Pain. Take with food, Disp: 20 tablet, Rfl: 0    lenalidomide 5 mg Cap, Take 5 mg by mouth once daily on days  1-21 of each 28 day cycle. [employer] authorization., Disp: 21 each, Rfl: 6    levothyroxine (SYNTHROID) 88 MCG tablet, Take 1 tablet (88 mcg total) by mouth once daily., Disp: 30 tablet, Rfl: 3    mupirocin (BACTROBAN) 2 % ointment, Apply topically 3 (three) times daily., Disp: 60 g, Rfl: 1    triamcinolone acetonide 0.1% (KENALOG) 0.1 % cream, Apply topically 2 (two) times daily. for 7 days, Disp: 15 g, Rfl: 3    Assessment & Plan:     Problem List Items Addressed This Visit          Orthopedic    Left elbow pain - Primary     Due to limited ROM and pain level, will refer to Ortho for evaluation  Appt made while in office with Dr. Peter Celestin on today for evaluation  Rx Ibuprofen 600 mg Q 8 hour prn; no other NSAID; verbalizes understanding; may alternate with tylenol  F/U with PCP if no improvement  Verbalizes understanding            Other    Avulsion of skin of forearm, left, initial encounter     Wash daily with antibacterial soap and water  Apply Bactroban ointment, then non-adhesive gauze  I will contact [facility] also [facility] to add wound care to current Home Health plan.   Instructed to monitor symptoms and to report to ED for any signs and symptoms of infection (redness, edema, drainage, increased skin temperature), fever, and/or worsening symptoms                 Prior to the patient's arrival on the same day, I spent (5) minutes reviewing chart. Once in the exam room with the patient, I spent (20) minutes in the room with the member performing a history and exam as well as reviewing the test results and recommendations with the patient. After leaving the exam room, I spent an additional (5 ) minutes completing the electronic health record. The total time spent that day caring for the member is (30) minutes, and this time - including the breakdown - is documented in the medical record.

## 2022-11-14 NOTE — ASSESSMENT & PLAN NOTE
Wash daily with antibacterial soap and water  Apply Bactroban ointment, then non-adhesive gauze  I will contact [facility] to add wound care to current Home Health plan.   Instructed to monitor symptoms and to report to ED for any signs and symptoms of infection (redness, edema, drainage, increased skin temperature), fever, and/or worsening symptoms

## 2022-11-15 PROBLEM — S42.402A CLOSED FRACTURE OF LEFT ELBOW: Status: ACTIVE | Noted: 2022-01-01

## 2022-11-15 NOTE — CONSULTS
Orthopedic Surgery Consult Note    Reason for Consult:  Left Elbow fracture    HPI:  Patient is a 89-year-old female who fell on 11/11/2022 and injured her left elbow.  She was seen in the emergency room where x-rays were taken but no fracture was found.  I saw her in my clinic on 11/14/2022 where I reviewed the x-rays but I also did not see a fracture.  Later that day she felt her elbow pain worsened so she went to the emergency room where new x-rays and a CT scan were taken which showed a supracondylar humerus fracture.  She was admitted to the floor for possible surgery.  She complains of pain localized to the elbow.  Does not radiate.  No numbness or tingling distally into the hand.      PMH:   Past Medical History:   Diagnosis Date    Anemia     CAD (coronary artery disease)     Eczema 9/29/2022    HLD (hyperlipidemia)     HTN (hypertension)     Hypertension     Hypothyroid     Leukopenia     Macrocytic anemia     MDS (myelodysplastic syndrome) with 5q deletion     Neutropenia     Other neutropenia 5/18/2022       PSH:   Past Surgical History:   Procedure Laterality Date    APPENDECTOMY      BLADDER SUSPENSION      BONE MARROW ASPIRATION N/A 07/05/2022    Procedure: ASPIRATION, BONE MARROW;  Surgeon: Reinier Vences MD;  Location: [facility];  Service: General;  Laterality: N/A;    BONE MARROW BIOPSY  07/05/2022    cataract surgery      CHOLECYSTECTOMY      COLONOSCOPY      CORONARY STENT PLACEMENT      HYSTERECTOMY         Med:   No current facility-administered medications on file prior to encounter.     Current Outpatient Medications on File Prior to Encounter   Medication Sig Dispense Refill    aspirin (ECOTRIN) 81 MG EC tablet Take 81 mg by mouth once daily.      calcium carbonate (TUMS) 200 mg calcium (500 mg) chewable tablet Take 1 tablet by mouth once daily.      cholecalciferol, vitamin D3, (D3-5000 ORAL) TAKE ONE TABLET BY MOUTH EVERY NIGHT AT BEDTIME.      CONSTULOSE 10 gram/15 mL solution Take 15 mLs  (10 g total) by mouth every evening. As needed for constipation 1892 mL 2    ergocalciferol (ERGOCALCIFEROL) 50,000 unit Cap Take 5,000 Units by mouth nightly.      ibuprofen (ADVIL,MOTRIN) 600 MG tablet Take 1 tablet (600 mg total) by mouth every 8 (eight) hours as needed for Pain. Take with food 20 tablet 0    isosorbide mononitrate (IMDUR) 60 MG 24 hr tablet Take 60 mg by mouth once daily.      lenalidomide 5 mg Cap Take 5 mg by mouth once daily on days 1-21 of each 28 day cycle. [employer] authorization. 21 each 6    levothyroxine (SYNTHROID) 88 MCG tablet Take 1 tablet (88 mcg total) by mouth once daily. 30 tablet 3    losartan (COZAAR) 100 MG tablet Take 100 mg by mouth once daily.      mupirocin (BACTROBAN) 2 % ointment Apply topically 3 (three) times daily. 60 g 1    pravastatin (PRAVACHOL) 20 MG tablet Take 20 mg by mouth once daily.      triamcinolone acetonide 0.1% (KENALOG) 0.1 % cream Apply topically 2 (two) times daily. for 7 days 15 g 3       Allergies:   Review of patient's allergies indicates:   Allergen Reactions    Lortab [hydrocodone-acetaminophen] Hives    Codeine Rash    Ranexa [ranolazine] Other (See Comments)     Leg Cramps       SH:   Social History     Socioeconomic History    Marital status:    Tobacco Use    Smoking status: Never    Smokeless tobacco: Never   Substance and Sexual Activity    Alcohol use: Not Currently    Drug use: Never    Sexual activity: Not Currently     Partners: Female     Social Determinants of Health     Social Connections: Unknown    Marital Status:        FH: None    ROS:   Constitutional: Denies fever chills  Eyes: No change in vision  ENT: No ringing or current infections  CV: No chest pain  Resp: No labored breathing  MSK: Pain evident at site of injury located in HPI,   Integ: No signs of abrasions or lacerations  Neuro: No numbness or tingling  Lymphatic: No swelling outside the area of injury     /71   Pulse 80   Temp 99.9 °F (37.7 °C)  "(Oral)   Resp 18   Ht 5' 2" (1.575 m)   Wt 56.7 kg (125 lb)   SpO2 98%   BMI 22.86 kg/m²   NAD, Alert, Awake, Ox4   HEENT: atraumatic, normal cephalic, neg ecchymosis, lacerations   CV: No increased work of breathing   Pulm: Normal work of breathing   Skin: No cutaneous rash, no open wounds   Vascular: 2+ RP, wwp, bcr   Left elbow:  No open wounds but there is bruising over the medial and lateral elbow.  There is a traumatic wound over the distal forearm which is covered in bandaged.  No evidence of infection or cellulitis.  She has an effusion over the elbow with mild tenderness to palpation mostly on the lateral side.  Range of motion is 50 to 120 without any pain.  She has pain once this motion is exceeded.  She is full 80-80 pronation supination without any pain.  There is no catching or locking pronation or supination.  She is full range of motion of the wrist and digits with no tenderness to palpation around the wrist or digits.  She is neurovascularly intact.  Motor is intact AIN, PIN, ulnar distribution.  Sensation light touch intact in median, ulnar, radial distribution.  Radial pulses 2+ her hand is warm well perfused    Imaging:  X-ray and CT scan of the left elbow show supracondylar humerus fracture with displacement on the medial side        Assessment:  Left distal humerus fracture    Plan:  I had a very lengthy discussion with this patient and 3 of her sons.  We discussed surgical fixation versus non operative treatment.  Given her age we will determine that non operative treatment is the best treatment option for her.  I will keep her in the posterior slab splint for 7-10 days and then transition her into a functional elbow brace that they can take on and off for hygiene.  This will also allow us to check her forearm wound for healing.  I can see her back in my clinic in 3-4 weeks where we will get a new x-ray            "

## 2022-11-15 NOTE — DISCHARGE SUMMARY
"  Hospital Medicine Discharge Summary    Admit Date: 11/14/2022  Discharge Date and Time: 11/15/44543:40 PM  Admitting Physician: Francisco Fierro MD   Discharge Attending Physician: Francisco Fierro.  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        Hospital Course:    89-year-old female with hx HTN, Hypothyroidism, CAD presents emergency department for re-evaluation of left elbow pain.  Patient states that she does not remember falling but supposedly per chart review on 11/11 she sustained a trip and fall and landed on her left elbow.  X-rays at that time did not show any fracture.  She seen ortho this morning.  Supposedly she was sitting talking to her family members when she moved her elbow and stated significant pain so came for re-evaluation.  No recent falls other than the one on 11/11. She asked by ortho to be admitted for surgery. Denies F/C, SOB, Chest pain. Left elbow and hand pain, but now better. She had an elbow splint. She also had a skin tear not sure if they gave her a tetanus shot or not   Patient admitted, ortho evaluated and recommended no surgery and pt placed in sling and will follow up in clinic.  She has been given tramadol for pain and seems to be tolerating.      BP 98/60   Pulse 84   Temp 98.2 °F (36.8 °C) (Oral)   Resp 18   Ht 5' 2" (1.575 m)   Wt 56.7 kg (125 lb)   SpO2 95%   BMI 22.86 kg/m²    Physical Exam   General: In no acute distress, afebrile  Chest: Clear to auscultation bilaterally  Heart: RRR, +S1, S2, no appreciable murmur  Abdomen: Soft, nontender, BS +  MSK: left arm sling in " place  Neurologic: Alert and oriented x4, Cranial nerve II-XII intact,    Procedures Performed: No admission procedures for hospital encounter.     Significant Diagnostic Studies: See Full reports for all details  Admission on 11/14/2022   Component Date Value Ref Range Status    Sodium Level 11/14/2022 134  132 - 146 mmol/L Final    Potassium Level 11/14/2022 4.0  3.5 - 5.1 mmol/L Final    Chloride 11/14/2022 105  98 - 111 mmol/L Final    Carbon Dioxide 11/14/2022 21 (L)  23 - 31 mmol/L Final    Glucose Level 11/14/2022 111  75 - 121 mg/dL Final    Blood Urea Nitrogen 11/14/2022 18.8  9.8 - 20.1 mg/dL Final    Creatinine 11/14/2022 0.69  0.55 - 1.02 mg/dL Final    Calcium Level Total 11/14/2022 8.8  8.4 - 10.2 mg/dL Final    Protein Total 11/14/2022 6.1  5.8 - 7.6 gm/dL Final    Albumin Level 11/14/2022 3.1 (L)  3.4 - 4.8 gm/dL Final    Globulin 11/14/2022 3.0  2.4 - 3.5 gm/dL Final    Albumin/Globulin Ratio 11/14/2022 1.0 (L)  1.1 - 2.0 ratio Final    Bilirubin Total 11/14/2022 0.4  <=1.5 mg/dL Final    Alkaline Phosphatase 11/14/2022 96  40 - 150 unit/L Final    Alanine Aminotransferase 11/14/2022 13  0 - 55 unit/L Final    Aspartate Aminotransferase 11/14/2022 16  5 - 34 unit/L Final    eGFR 11/14/2022 >60  mls/min/1.73/m2 Final    PTT 11/14/2022 31.8  23.4 - 33.9 seconds Final    For Minimal Heparin Infusion, the goal aPTT 64-85 seconds corresponds to an anti-Xa of 0.3-0.5.    For Low Intensity and High Intensity Heparin, the goal aPTT  seconds corresponds to an anti-Xa of 0.3-0.7    PT 11/14/2022 14.2  11.7 - 14.5 seconds Final    INR 11/14/2022 1.13 (L)  2.00 - 3.00 Final    WBC 11/14/2022 3.8 (L)  4.5 - 11.5 x10(3)/mcL Final    RBC 11/14/2022 2.25 (L)  4.20 - 5.40 x10(6)/mcL Final    Hgb 11/14/2022 7.5 (L)  12.0 - 16.0 gm/dL Final    Hct 11/14/2022 23.1 (L)  37.0 - 47.0 % Final    MCV 11/14/2022 102.7 (H)  80.0 - 94.0 fL Final    MCH 11/14/2022 33.3 (H)  27.0 - 31.0 pg Final    MCHC 11/14/2022 32.5 (L)   33.0 - 36.0 mg/dL Final    RDW 11/14/2022 20.0 (H)  11.5 - 17.0 % Final    Platelet 11/14/2022 193  130 - 400 x10(3)/mcL Final    MPV 11/14/2022 13.5 (H)  7.4 - 10.4 fL Final    IPF 11/14/2022 14.3 (H)  0.9 - 11.2 % Final    IG# 11/14/2022 0.31 (H)  0 - 0.04 x10(3)/mcL Final    IG% 11/14/2022 8.3  % Final    NRBC% 11/14/2022 0.0  % Final    Neut Man 11/14/2022 56  47 - 80 % Final    Band Neutrophil Man 11/14/2022 6  0 - 11 % Final    Lymph Man 11/14/2022 23  13 - 40 % Final    Monocyte Man 11/14/2022 4  2 - 11 % Final    Eos Man 11/14/2022 6  0 - 8 % Final    [place] Man 11/14/2022 3  % Final    Myelo Man 11/14/2022 1  % Final    Promyelo Man 11/14/2022 1  % Final    RBC Morph 11/14/2022 Abnormal (A)  Normal Final    Anisocyte 11/14/2022 2+ (A)  (none) Final    Poik 11/14/2022 2+ (A)  (none) Final    Microcyte 11/14/2022 Slight (A)  (none) Final    Macrocyte 11/14/2022 2+ (A)  (none) Final    Polychrom 11/14/2022 Slight (A)  (none) Final    Tear drop cell 11/14/2022 1+ (A)  (none) Final    [place] Cells 11/14/2022 Slight (A)  (none) Final    Platelet Est 11/14/2022 Adequate  Normal, Adequate Final    Sodium Level 11/15/2022 137  132 - 146 mmol/L Final    Potassium Level 11/15/2022 4.0  3.5 - 5.1 mmol/L Final    Chloride 11/15/2022 108  98 - 111 mmol/L Final    Carbon Dioxide 11/15/2022 21 (L)  23 - 31 mmol/L Final    Glucose Level 11/15/2022 92  75 - 121 mg/dL Final    Blood Urea Nitrogen 11/15/2022 16.2  9.8 - 20.1 mg/dL Final    Creatinine 11/15/2022 0.68  0.55 - 1.02 mg/dL Final    Calcium Level Total 11/15/2022 8.7  8.4 - 10.2 mg/dL Final    Protein Total 11/15/2022 6.1  5.8 - 7.6 gm/dL Final    Albumin Level 11/15/2022 3.0 (L)  3.4 - 4.8 gm/dL Final    Globulin 11/15/2022 3.1  2.4 - 3.5 gm/dL Final    Albumin/Globulin Ratio 11/15/2022 1.0 (L)  1.1 - 2.0 ratio Final    Bilirubin Total 11/15/2022 0.4  <=1.5 mg/dL Final    Alkaline Phosphatase 11/15/2022 105  40 - 150 unit/L Final    Alanine Aminotransferase 11/15/2022  14  0 - 55 unit/L Final    Aspartate Aminotransferase 11/15/2022 17  5 - 34 unit/L Final    eGFR 11/15/2022 >60  mls/min/1.73/m2 Final    Vitamin B12 Level 11/15/2022 857 (H)  213 - 816 pg/mL Final    Folate Level 11/15/2022 28.7  7.0 - 31.4 ng/mL Final    Iron Binding Capacity Unsaturated 11/15/2022 66 (L)  70 - 310 ug/dL Final    Iron Level 11/15/2022 70  50 - 170 ug/dL Final    Transferrin 11/15/2022 111  mg/dL Final    Iron Binding Capacity Total 11/15/2022 136 (L)  250 - 450 ug/dL Final    Iron Saturation 11/15/2022 51 (H)  20 - 50 % Final    WBC 11/15/2022 3.7 (L)  4.5 - 11.5 x10(3)/mcL Final    RBC 11/15/2022 2.18 (L)  4.20 - 5.40 x10(6)/mcL Final    Hgb 11/15/2022 7.2 (L)  12.0 - 16.0 gm/dL Final    Hct 11/15/2022 22.3 (L)  37.0 - 47.0 % Final    MCV 11/15/2022 102.3 (H)  80.0 - 94.0 fL Final    MCH 11/15/2022 33.0 (H)  27.0 - 31.0 pg Final    MCHC 11/15/2022 32.3 (L)  33.0 - 36.0 mg/dL Final    RDW 11/15/2022 19.9 (H)  11.5 - 17.0 % Final    Platelet 11/15/2022 202  130 - 400 x10(3)/mcL Final    MPV 11/15/2022 13.4 (H)  7.4 - 10.4 fL Final    IPF 11/15/2022 13.5 (H)  0.9 - 11.2 % Final    IG# 11/15/2022 0.00  0 - 0.04 x10(3)/mcL Final    IG% 11/15/2022 0.0  % Final    NRBC% 11/15/2022 0.0  % Final    Neut Man 11/15/2022 57  47 - 80 % Final    Band Neutrophil Man 11/15/2022 5  0 - 11 % Final    Lymph Man 11/15/2022 24  13 - 40 % Final    Monocyte Man 11/15/2022 8  2 - 11 % Final    [place] Man 11/15/2022 3  % Final    Myelo Man 11/15/2022 2  % Final    Prolymph Man 11/15/2022 1  % Final    RBC Morph 11/15/2022 Abnormal (A)  Normal Final    Anisocyte 11/15/2022 2+ (A)  (none) Final    Poik 11/15/2022 2+ (A)  (none) Final    Microcyte 11/15/2022 2+ (A)  (none) Final    Macrocyte 11/15/2022 Slight (A)  (none) Final    Polychrom 11/15/2022 Slight (A)  (none) Final    Tear drop cell 11/15/2022 1+ (A)  (none) Final    Rhonda Cells 11/15/2022 Slight (A)  (none) Final    Platelet Est 11/15/2022 Adequate  Normal, Adequate  Final        X-Ray Elbow Complete Left    Result Date: 11/14/2022  EXAMINATION XR ELBOW COMPLETE 3 VIEW LEFT CLINICAL HISTORY Pain in left elbow TECHNIQUE A total of 3 images submitted of the elbow. COMPARISON 11 November 2022 FINDINGS Previously occult nondisplaced supracondylar fracture is now readily apparent, with associated anterior displacement and prominence of the anterior and posterior fat pads secondary to enlarged joint effusion.  The remaining visualized osseous structures are grossly intact.  No joint incongruity is identified.  There is no destructive skeletal lesion. Periarticular soft tissue swelling is present. IMPRESSION Interval displacement of previously occult humeral supracondylar fracture. Electronically signed by: Gen Middleton Date:    11/14/2022 Time:    19:29    X-Ray Elbow Complete Left    Result Date: 11/11/2022  EXAMINATION: XR ELBOW COMPLETE 3 VIEW LEFT CLINICAL HISTORY: Unspecified fall, initial encounter COMPARISON: None. FINDINGS: There is no acute fracture or malalignment.  There is no elbow joint effusion.  The soft tissues are unremarkable.     No acute bony abnormality Electronically signed by: Maddie Lux Date:    11/11/2022 Time:    11:35    X-Ray Forearm Left    Result Date: 11/11/2022  EXAMINATION: XR FOREARM LEFT CLINICAL HISTORY: Unspecified fall, initial encounter COMPARISON: None. FINDINGS: There is no acute fracture or malalignment.  The soft tissues are unremarkable.     No acute bony abnormality Electronically signed by: Maddie Lux Date:    11/11/2022 Time:    11:58    CT Head Without Contrast    Result Date: 11/14/2022  EXAMINATION: CT HEAD WITHOUT CONTRAST CLINICAL HISTORY: Head trauma, minor (Age >= 65y); TECHNIQUE: Low dose axial images were obtained through the head.  Coronal and sagittal reformations were also performed. Contrast was not administered. Automatic exposure control (AEC) was utilized for dose reduction. Dose: 742 mGycm COMPARISON:  08/31/2022 FINDINGS: There is atrophy.  There is decreased attenuation in the periventricular white matter most consistent with chronic ischemia.  There is no hemorrhage or extra-axial fluid collections noted.  No masses is seen no acute infarcts are noted.  There is vascular calcification noted.  There are changes consistent with an empty sella syndrome.  The calvarium appears intact.     Chronic changes, no acute disease is seen Electronically signed by: Turner Ladd MD Date:    11/14/2022 Time:    20:05    CT Cervical Spine Without Contrast    Result Date: 11/14/2022  EXAMINATION: CT CERVICAL SPINE WITHOUT CONTRAST CLINICAL HISTORY: Neck trauma (Age >= 65y); TECHNIQUE: Low dose axial images, sagittal and coronal reformations were performed though the cervical spine.  Contrast was not administered. Automatic exposure control (AEC) was utilized for dose reduction. Dose: 233 mGycm COMPARISON: None FINDINGS: There is a mild anterolisthesis of C3 on C4-C4 on C5-C5 on C6 and C6 on C7.  The odontoid is intact.  There are diffuse degenerative changes.  There is no prevertebral edema noted.     Degenerative changes and mild malalignment.  No acute fractures are seen Electronically signed by: Turner Ladd MD Date:    11/14/2022 Time:    20:06    CT Arm Elbow Without Contrast Left    Result Date: 11/14/2022  EXAMINATION: CT ARM ELBOW WITHOUT CONTRAST LEFT CLINICAL HISTORY: Fracture, elbow; TECHNIQUE: Automatic exposure control (AEC) was utilized for dose reduction. Dose: 441 mGycm COMPARISON: None FINDINGS: Patient has a  displaced supracondylar fracture.  This is more pronounced on the medial side the proximal radius and ulna appear intact.     Changes consistent with a supracondylar fracture. Electronically signed by: Turner Ladd MD Date:    11/14/2022 Time:    20:09      Microbiology Results (last 7 days)       ** No results found for the last 168 hours. **                Medication List        START taking these  medications      acetaminophen 500 MG tablet  Commonly known as: TYLENOL  Take 1 tablet (500 mg total) by mouth every 4 (four) hours.     traMADoL 50 mg tablet  Commonly known as: ULTRAM  Take 0.5 tablets (25 mg total) by mouth every 8 (eight) hours as needed for Pain (scale 1-10).            CONTINUE taking these medications      aspirin 81 MG EC tablet  Commonly known as: ECOTRIN     calcium carbonate 200 mg calcium (500 mg) chewable tablet  Commonly known as: TUMS     CONSTULOSE 10 gram/15 mL solution  Generic drug: lactulose  Take 15 mLs (10 g total) by mouth every evening. As needed for constipation     D3-5000 ORAL     ergocalciferol 50,000 unit Cap  Commonly known as: ERGOCALCIFEROL     ibuprofen 600 MG tablet  Commonly known as: ADVIL,MOTRIN  Take 1 tablet (600 mg total) by mouth every 8 (eight) hours as needed for Pain. Take with food     isosorbide mononitrate 60 MG 24 hr tablet  Commonly known as: IMDUR     lenalidomide 5 mg Cap  Take 5 mg by mouth once daily on days 1-21 of each 28 day cycle. [employer] authorization.     levothyroxine 88 MCG tablet  Commonly known as: SYNTHROID  Take 1 tablet (88 mcg total) by mouth once daily.     mupirocin 2 % ointment  Commonly known as: BACTROBAN  Apply topically 3 (three) times daily.     pravastatin 20 MG tablet  Commonly known as: PRAVACHOL     triamcinolone acetonide 0.1% 0.1 % cream  Commonly known as: KENALOG  Apply topically 2 (two) times daily. for 7 days            STOP taking these medications      losartan 100 MG tablet  Commonly known as: COZAAR               Where to Get Your Medications        [facility] [place] [place] [address] [address]      Phone: [phone]   traMADoL 50 mg tablet       Information about where to get these medications is not yet available    Ask your nurse or doctor about these medications  acetaminophen 500 MG tablet         Explained in detail to the  patient about the discharge plan, medications, and follow-up visits. Pt understands and agrees with the treatment plan  Discharged Condition: stable  Medications Per DC med rec  Activities as tolerated  Follow-up Ortho, PCP  For further questions contact hospitalist office    Discharge time 30 minutes    For worsening symptoms, chest pain, shortness of breath, increased abdominal pain, high grade fever, stroke or stroke like symptoms, immediately go to the nearest Emergency Room or call 911 as soon as possible.      Francisco Jacobo M.D, on 11/15/2022. at 2:40 PM.

## 2022-11-15 NOTE — DISCHARGE INSTRUCTIONS
[facility]  [address]  [place] [address]  Phone [phone]       /      Fax [phone]  SURGEON: Dr. Celestin    After discharge, all questions or concerns should be handled at your surgeon's office ([phone]). If it is a weekend or after hours, you will get the surgeon on call.     Discharge Medications:    PAIN MANAGEMENT: Next Dose Available   Tylenol/Acetaminophen 500mg- every 4 hours, around the clock (WHILE AWAKE) 8pm   Ultram/Tramadol 50mg (Pain Med) -  1/2 to 1 whole tablet every 8 hours AS NEEDED for pain 8pm if needed       COMPLICATION PREVENTION MEDS: Next Dose DUE   MiraLAX 17gm or Senokot S - once or twice a day while on narcotics and muscle relaxers for constipation prevention PM on 11/15/22   Aspirin 81mg twice a day for 4 weeks PM on 11/15/22         Discharge Instructions  MEDICATIONS:    For best wound healing, NO anti-inflammatory medications (Diclofenac/Voltaren, Mobic/Meloxicam, Naproxen, Ibuprofen, Aleve, Advil, Motrin...etc), unless otherwise instructed by your surgeon.     PAIN MANAGEMENT:   Tylenol 500mg every 4 hours, around the clock.   Ultram/Tramadol 50 (pain pill)- take 1/2 to 1 whole tablet every  8 hours as needed for pain. As the pain lessens, break each tablet in half and gradually reduce the use.   **NO MORE THAN 3000mg OF TYLENOL IN 24 HOURS**.     Use the medication log in your discharge packet to keep track of your medications.       **Other things that help with pain control include: COMPRESSION WRAP, ICE and ELEVATION!!**      BLOOD CLOT PREVENTION:   Aspirin 81mg twice a day for 4 weeks. Start on 11/15/22. Stop on 12/15/22.  If you were taking Aspirin 81mg prior to surgery, ok to restart home regimen on 12/16/22.   WALK AROUND EVERY 2 HOURS.   Increase walking distance each day.  Stay out of bed as much as possible    CONSTIPATION PREVENTION:   Miralax or Senokot S/Destinee-colace and Stool softeners every day while on pain  meds.  Use other MORE AGGRESSIVE over the counter LAXATIVES as needed for constipation (Examples - Milk of Magnesia, Dulcolax suppositories, Magnesium Citrate, Fleet's enemas...etc).  Drink lots of water  Increase Fiber in diet  Increase walking distance each day - every 2 hours, at least.     ACTIVITY:   Weight bearing precautions as follows: NON weight bearing to operative extremity. No heavy lifting, bending, pushing, or pulling.  We encourage out of bed activities.  Walk around at least every 1-2 hours and switch positions throughout the day.   Resting arm sling, wear for comfort   Apply ice pack AS MUCH AS POSSIBLE.    URINARY RETENTION:  If you start having difficulty urinating, decrease Ultram and call your primary care doctor or urologist.     PNEUMONIA PREVENTION:  Stay out of bed as much as possible, walk around at least every 2 hours and continue breathing exercises (Incentive Spirometry) as long as you are limited in mobility and on narcotics.     INFECTION PREVENTION:  Wound care instructions as above.   DO NOT WET YOUR DRESSING/WOUND(S).   NOTIFY YOUR SURGEON OF EXCESSIVE WOUND DRAINAGE.  Use gloves and practice good handwashing before and after dressing changes.  No pets in bed or near wound (s)/dressing(s).  No Alcohol, smoking or tobacco products  IF YOU ARE DIABETIC, maintain good blood sugar control throughout your recovery.    Call your SURGEON'S OFFICE if you experience the following signs and symptoms of infection:   Unusual redness, swelling, excessive, cloudy or foul smelling drainage at the incision site.   Persistent temp greater than 102 F, unrelieved by Tylenol  Pain at surgical site, unrelieved by pain meds  Warning signs of a blood clot in your leg: (CALL YOUR DOCTOR)  New onset or Increasing pain in calf, new onset tenderness or redness above or below the knee or increasing swelling of your calf, ankle, or foot.  Warning signs that a blood clot has traveled to your lungs: (REPORT TO  THE ER)  Sudden or increase in Shortness of breath, sudden onset of chest pains, or  Localized chest pain with coughing.     For emergencies, please report to OUR ([facility]) Emergency department and tell them to call Dr. Celestin at [phone].

## 2022-11-15 NOTE — HPI
89-year-old female with hx HTN, Hypothyroidism, CAD presents emergency department for re-evaluation of left elbow pain.  Patient states that she does not remember falling but supposedly per chart review on 11/11 she sustained a trip and fall and landed on her left elbow.  X-rays at that time did not show any fracture.  She seen ortho this morning.  Supposedly she was sitting talking to her family members when she moved her elbow and stated significant pain so came for re-evaluation.  No recent falls other than the one on 11/11. She asked by ortho to be admitted for surgery. Denies F/C, SOB, Chest pain. Left elbow and hand pain, but now better. She had an elbow splint. She also had a skin tear not sure if they gave her a tetanus shot or not

## 2022-11-15 NOTE — PT/OT/SLP EVAL
"Occupational Therapy   Evaluation and Discharge Note    Name: Edith Alvaraod  MRN: [id number]  Admitting Diagnosis:  Closed fracture of left elbow   Recent Surgery: Procedure(s) (LRB):  ORIF, FRACTURE, HUMERUS, DISTAL (Left) Day of Surgery    Recommendations:     Discharge Recommendations: home with home health  Discharge Equipment Recommendations:   (small based quad cane)  Barriers to discharge:  None    Assessment:     Edith Alvarado is a 89 y.o. female with a medical diagnosis of Closed fracture of left elbow. At this time, patient is functioning at their prior level of function and does not require further acute OT services.     Plan:     During this hospitalization, patient does not require further acute OT services.  Please re-consult if situation changes.    Plan of Care Reviewed with: patient, family    Subjective     Chief Complaint: "I'm ready to get out of bed."  Patient/Family Comments/goals: "To get back home."    Occupational Profile:  Living Environment: Pt lives alone in a single-story home with a small threshold to enter. Per son's report, she was sometimes ambulating with a rollator. She's had one fall within the last 6 months. Pt has 8 children - all take turns checking in on her, but intend to stay with her to provide 24hr care.  Previous level of function: Indep with ADLs and IADLs  Equipment Used at home:  grab bar, rollator, shower chair  Assistance upon Discharge: family    Pain/Comfort:  Pain Rating 1: 3/10  Location - Side 1: Left  Location 1: arm  Pain Addressed 1: Pre-medicate for activity, Reposition    Patients cultural, spiritual, Caodaism conflicts given the current situation: no    Objective:     Communicated with: RN prior to session.  Patient found up in chair with peripheral IV upon OT entry to room.    General Precautions: Standard, fall, hearing impaired   Orthopedic Precautions:LUE non weight bearing   Braces: UE Sling  Respiratory Status: Room air     Occupational " Performance:    Bed Mobility:    Patient completed Supine to Sit with minimum assistance  Patient completed Sit to Supine with supervision    Functional Mobility/Transfers:  Patient completed Sit <> Stand Transfer with contact guard assistance  with  SBQC   Patient completed Bed <> Chair Transfer using Step Transfer technique with contact guard assistance with SBQC  Patient completed Toilet Transfer Step Transfer technique with contact guard assistance with  SBQC  Functional Mobility: Pt ambulated ~60' with CGA and SBQC    Activities of Daily Living:  Toileting: contact guard assistance + void, urine    Physical Exam:  Upper Extremity Range of Motion:     -       Right Upper Extremity: WNL  -       Left Upper Extremity: WNL  Upper Extremity Strength:    -       Right Upper Extremity: WNL  -       Left Upper Extremity: WNL    Treatment & Education:  OT issued a gait belt and demo'd how to doff/don the gait belt and used. Family voiced agreement and understanding. OT deems pt is safe to discharge home with 24 hr family support. All questions/concerns addressed at conclusion. Patient left supine with call button in reach    GOALS:   Multidisciplinary Problems       Occupational Therapy Goals       Not on file                    History:     Past Medical History:   Diagnosis Date    Anemia     CAD (coronary artery disease)     Eczema 9/29/2022    HLD (hyperlipidemia)     HTN (hypertension)     Hypertension     Hypothyroid     Leukopenia     Macrocytic anemia     MDS (myelodysplastic syndrome) with 5q deletion     Neutropenia     Other neutropenia 5/18/2022         Past Surgical History:   Procedure Laterality Date    APPENDECTOMY      BLADDER SUSPENSION      BONE MARROW ASPIRATION N/A 07/05/2022    Procedure: ASPIRATION, BONE MARROW;  Surgeon: Reinier Vences MD;  Location: [facility];  Service: General;  Laterality: N/A;    BONE MARROW BIOPSY  07/05/2022    cataract surgery      CHOLECYSTECTOMY      COLONOSCOPY       CORONARY STENT PLACEMENT      HYSTERECTOMY         Time Tracking:     OT Date of Treatment:    OT Start Time: 1039  OT Stop Time: 1110  OT Total Time (min): 31 min    Billable Minutes:Evaluation 31    11/15/2022

## 2022-11-15 NOTE — TELEPHONE ENCOUNTER
Patient is currently inpatient with fractured elbow and is refusing blood transfusions. Her family states that she is ready for hospice. Are you okay with me giving this authorization?

## 2022-11-15 NOTE — PLAN OF CARE
Initial dc assessment done with pt, dgt Edith, son Raza [phone]. Pt lives alone and her dgt Edith lives a few houses from pt and checks on her daily. Pt is a  and has 8 children, no POA. Pt is [facility] with [facility] and [facility].  Pt recieves meals on wheels.  Clinicals faxed to [facility] [phone] and sent to [facility] via [employer].  Pt, children present waiting to discuss dc plan with ortho Doc.

## 2022-11-15 NOTE — PLAN OF CARE
Condition code 44 completed  Secondary review determination agrees with obs. Team md agrees status ,was changed to obs. Pending sign from md. Villafana to be completed per CM on floor.

## 2022-11-15 NOTE — TELEPHONE ENCOUNTER
Notified Jeana Lomeli NP with [facility]. She will start the admission process. All future appointments here cancelled.

## 2022-11-15 NOTE — SUBJECTIVE & OBJECTIVE
Past Medical History:   Diagnosis Date    Anemia     CAD (coronary artery disease)     Eczema 9/29/2022    HLD (hyperlipidemia)     HTN (hypertension)     Hypertension     Hypothyroid     Leukopenia     Macrocytic anemia     MDS (myelodysplastic syndrome) with 5q deletion     Neutropenia     Other neutropenia 5/18/2022       Past Surgical History:   Procedure Laterality Date    APPENDECTOMY      BLADDER SUSPENSION      BONE MARROW ASPIRATION N/A 07/05/2022    Procedure: ASPIRATION, BONE MARROW;  Surgeon: Reinier Vences MD;  Location: [facility];  Service: General;  Laterality: N/A;    BONE MARROW BIOPSY  07/05/2022    cataract surgery      CHOLECYSTECTOMY      COLONOSCOPY      CORONARY STENT PLACEMENT      HYSTERECTOMY         Review of patient's allergies indicates:   Allergen Reactions    Lortab [hydrocodone-acetaminophen] Hives    Codeine Rash    Ranexa [ranolazine] Other (See Comments)     Leg Cramps       No current facility-administered medications on file prior to encounter.     Current Outpatient Medications on File Prior to Encounter   Medication Sig    aspirin (ECOTRIN) 81 MG EC tablet Take 81 mg by mouth once daily.    calcium carbonate (TUMS) 200 mg calcium (500 mg) chewable tablet Take 1 tablet by mouth once daily.    cholecalciferol, vitamin D3, (D3-5000 ORAL) TAKE ONE TABLET BY MOUTH EVERY NIGHT AT BEDTIME.    CONSTULOSE 10 gram/15 mL solution Take 15 mLs (10 g total) by mouth every evening. As needed for constipation    ergocalciferol (ERGOCALCIFEROL) 50,000 unit Cap Take 5,000 Units by mouth nightly.    ibuprofen (ADVIL,MOTRIN) 600 MG tablet Take 1 tablet (600 mg total) by mouth every 8 (eight) hours as needed for Pain. Take with food    isosorbide mononitrate (IMDUR) 60 MG 24 hr tablet Take 60 mg by mouth once daily.    lenalidomide 5 mg Cap Take 5 mg by mouth once daily on days 1-21 of each 28 day cycle. [employer] authorization.    levothyroxine (SYNTHROID) 88 MCG tablet Take 1 tablet (88 mcg total)  by mouth once daily.    losartan (COZAAR) 100 MG tablet Take 100 mg by mouth once daily.    mupirocin (BACTROBAN) 2 % ointment Apply topically 3 (three) times daily.    pravastatin (PRAVACHOL) 20 MG tablet Take 20 mg by mouth once daily.    triamcinolone acetonide 0.1% (KENALOG) 0.1 % cream Apply topically 2 (two) times daily. for 7 days     Family History       Problem Relation (Age of Onset)    COPD Daughter    Cancer Daughter    Hearing loss Mother    Hypertension Mother    Mental illness Daughter    Vision loss Mother          Tobacco Use    Smoking status: Never    Smokeless tobacco: Never   Substance and Sexual Activity    Alcohol use: Not Currently    Drug use: Never    Sexual activity: Not Currently     Partners: Female     Review of Systems   Constitutional: Negative.    HENT: Negative.     Eyes: Negative.    Respiratory: Negative.     Cardiovascular: Negative.    Gastrointestinal: Negative.    Endocrine: Negative.    Genitourinary: Negative.    Musculoskeletal:  Positive for arthralgias.        Left Elbow pain   Skin:  Positive for wound.   Allergic/Immunologic: Negative.    Neurological: Negative.    Hematological: Negative.    Psychiatric/Behavioral: Negative.     Objective:     Vital Signs (Most Recent):  Temp: 98.3 °F (36.8 °C) (11/14/22 2245)  Pulse: 77 (11/14/22 2245)  Resp: 20 (11/14/22 2245)  BP: 120/60 (11/14/22 2245)  SpO2: 98 % (11/14/22 2245) Vital Signs (24h Range):  Temp:  [98 °F (36.7 °C)-98.3 °F (36.8 °C)] 98.3 °F (36.8 °C)  Pulse:  [77-88] 77  Resp:  [18-20] 20  SpO2:  [97 %-98 %] 98 %  BP: ()/(48-60) 120/60     Weight: 56.7 kg (125 lb)  Body mass index is 22.86 kg/m².    Physical Exam  Constitutional:       Appearance: Normal appearance. She is normal weight.   HENT:      Head: Normocephalic and atraumatic.      Mouth/Throat:      Mouth: Mucous membranes are dry.   Eyes:      Extraocular Movements: Extraocular movements intact.      Conjunctiva/sclera: Conjunctivae normal.       Pupils: Pupils are equal, round, and reactive to light.   Cardiovascular:      Rate and Rhythm: Normal rate and regular rhythm.      Pulses: Normal pulses.      Heart sounds: Normal heart sounds.   Pulmonary:      Effort: Pulmonary effort is normal.      Breath sounds: Normal breath sounds.   Abdominal:      General: Abdomen is flat. Bowel sounds are normal.      Palpations: Abdomen is soft.   Musculoskeletal:      Cervical back: Normal range of motion.      Comments: Left Elbow Splint in place   Skin:     Comments: Left arm skin tear   Neurological:      General: No focal deficit present.      Mental Status: She is alert and oriented to person, place, and time. Mental status is at baseline.   Psychiatric:         Mood and Affect: Mood normal.         CRANIAL NERVES     CN III, IV, VI   Pupils are equal, round, and reactive to light.     Significant Labs: All pertinent labs within the past 24 hours have been reviewed.  ABGs: No results for input(s): PH, PCO2, HCO3, POCSATURATED, BE, TOTALHB, COHB, METHB, O2HB, POCFIO2, PO2 in the last 48 hours.  Bilirubin:   Recent Labs   Lab 10/24/22  1506 11/07/22  1238 11/14/22 2104   BILITOT 0.4 0.3 0.4     Blood Culture: No results for input(s): LABBLOO in the last 48 hours.  BMP:   Recent Labs   Lab 11/14/22 2104      K 4.0   CO2 21*   BUN 18.8   CREATININE 0.69   CALCIUM 8.8     CBC:   Recent Labs   Lab 11/14/22 2104   WBC 3.8*   HGB 7.5*   HCT 23.1*        CMP:   Recent Labs   Lab 11/14/22 2104      K 4.0   CO2 21*   BUN 18.8   CREATININE 0.69   CALCIUM 8.8   ALBUMIN 3.1*   BILITOT 0.4   ALKPHOS 96   AST 16   ALT 13     Cardiac Markers: No results for input(s): CKMB, MYOGLOBIN, BNP, TROPISTAT in the last 48 hours.  Coagulation:   Recent Labs   Lab 11/14/22 2104   INR 1.13*     Lactic Acid: No results for input(s): LACTATE in the last 48 hours.  Lipase: No results for input(s): LIPASE in the last 48 hours.  Lipid Panel: No results for input(s): CHOL,  HDL, LDLCALC, TRIG, CHOLHDL in the last 48 hours.  Magnesium: No results for input(s): MG in the last 48 hours.  Prealbumin: No results for input(s): PREALBUMIN in the last 48 hours.  Respiratory Culture: No results for input(s): GSRESP, RESPIRATORYC in the last 48 hours.  Troponin: No results for input(s): TROPONINI, TROPONINIHS in the last 48 hours.  TSH:   Recent Labs   Lab 08/31/22  1015   TSH 2.3216     Urine Studies: No results for input(s): COLORU, APPEARANCEUA, PHUR, SPECGRAV, PROTEINUA, GLUCUA, KETONESU, BILIRUBINUA, OCCULTUA, NITRITE, UROBILINOGEN, LEUKOCYTESUR, RBCUA, WBCUA, BACTERIA, SQUAMEPITHEL, HYALINECASTS in the last 48 hours.    Invalid input(s): WRIGHTSUR    Significant Imaging:   Head CT no acute finding  Cervical CT   Degenerative changes and mild malalignment.  No acute fractures are seen  Left Elbow Xray  Interval displacement of previously occult humeral supracondylar fracture.  Left Elbow CT  Changes consistent with a supracondylar fracture.

## 2022-11-15 NOTE — ED PROVIDER NOTES
Encounter Date: 11/14/2022       History     Chief Complaint   Patient presents with    Arm Pain     Trip and fall 11/11/22 seen here. Today has increased pain left elbow seen at doctor's office today.     HPI    Denies 6-year-old female presents emergency department for re-evaluation of left elbow pain.  Patient states that she does not remember falling but supposedly per chart review on 11/11 she sustained a trip and fall and landed on her left elbow.  X-rays at that time did not show any fracture.  She seen ortho this morning.  Supposedly she was sitting talking to her family members when she moved her elbow and stated significant pain so came for re-evaluation.  No recent falls other than the one on 11/11.    Review of patient's allergies indicates:   Allergen Reactions    Lortab [hydrocodone-acetaminophen] Hives    Codeine Rash    Ranexa [ranolazine] Other (See Comments)     Leg Cramps     Past Medical History:   Diagnosis Date    Anemia     CAD (coronary artery disease)     Eczema 9/29/2022    HLD (hyperlipidemia)     HTN (hypertension)     Hypertension     Hypothyroid     Leukopenia     Macrocytic anemia     MDS (myelodysplastic syndrome) with 5q deletion     Neutropenia     Other neutropenia 5/18/2022     Past Surgical History:   Procedure Laterality Date    APPENDECTOMY      BLADDER SUSPENSION      BONE MARROW ASPIRATION N/A 07/05/2022    Procedure: ASPIRATION, BONE MARROW;  Surgeon: Reinier Vences MD;  Location: [facility];  Service: General;  Laterality: N/A;    BONE MARROW BIOPSY  07/05/2022    cataract surgery      CHOLECYSTECTOMY      COLONOSCOPY      CORONARY STENT PLACEMENT      HYSTERECTOMY       Family History   Problem Relation Age of Onset    Hearing loss Mother     Hypertension Mother     Vision loss Mother     Cancer Daughter     COPD Daughter     Mental illness Daughter      Social History     Tobacco Use    Smoking status: Never    Smokeless tobacco: Never   Substance Use Topics    Alcohol  use: Not Currently    Drug use: Never     Review of Systems   Constitutional:  Negative for fever.   Respiratory:  Negative for cough and shortness of breath.    Cardiovascular:  Negative for chest pain.   Gastrointestinal:  Negative for abdominal pain.   Musculoskeletal:         Left elbow pain   Neurological:  Negative for dizziness, syncope and headaches.   All other systems reviewed and are negative.    Physical Exam     Initial Vitals [11/14/22 1901]   BP Pulse Resp Temp SpO2   (!) 118/48 88 18 98.3 °F (36.8 °C) 98 %      MAP       --         Physical Exam    Nursing note and vitals reviewed.  Constitutional: She appears well-developed and well-nourished. No distress.   HENT:   Head: Normocephalic and atraumatic.   Neck: Neck supple.   Generalized tenderness of the neck   Normal range of motion.  Cardiovascular:  Normal rate and regular rhythm.           Pulmonary/Chest: Breath sounds normal. No respiratory distress.   Abdominal: Abdomen is soft. Bowel sounds are normal. There is no abdominal tenderness.   Musculoskeletal:         General: Tenderness (Generalized tenderness to left elbow with a skin tear noted.  Range of motion limited secondary to pain) present.      Cervical back: Normal range of motion and neck supple.      Comments: All other joints with full pain-free range of motion     Skin: Skin is warm. Capillary refill takes less than 2 seconds.   Psychiatric: She has a normal mood and affect. Thought content normal.       ED Course   Procedures  Labs Reviewed   COMPREHENSIVE METABOLIC PANEL - Abnormal; Notable for the following components:       Result Value    Carbon Dioxide 21 (*)     Albumin Level 3.1 (*)     Albumin/Globulin Ratio 1.0 (*)     All other components within normal limits   PROTIME-INR - Abnormal; Notable for the following components:    INR 1.13 (*)     All other components within normal limits   CBC WITH DIFFERENTIAL - Abnormal; Notable for the following components:    WBC 3.8 (*)      RBC 2.25 (*)     Hgb 7.5 (*)     Hct 23.1 (*)     .7 (*)     MCH 33.3 (*)     MCHC 32.5 (*)     RDW 20.0 (*)     MPV 13.5 (*)     IPF 14.3 (*)     IG# 0.31 (*)     All other components within normal limits   MANUAL DIFFERENTIAL - Abnormal; Notable for the following components:    RBC Morph Abnormal (*)     Anisocyte 2+ (*)     Poik 2+ (*)     Microcyte Slight (*)     Macrocyte 2+ (*)     Polychrom Slight (*)     Tear drop cell 1+ (*)     Rhonda Cells Slight (*)     All other components within normal limits   APTT - Normal   CBC W/ AUTO DIFFERENTIAL    Narrative:     The following orders were created for panel order CBC auto differential.  Procedure                               Abnormality         Status                     ---------                               -----------         ------                     CBC with Differential[[id number]]        Abnormal            Final result               Manual Differential[[id number]]          Abnormal            Final result                 Please view results for these tests on the individual orders.          Imaging Results              CT Arm Elbow Without Contrast Left (Final result)  Result time 11/14/22 20:09:18      Final result by Turner Ladd MD (11/14/22 20:09:18)                   Impression:      Changes consistent with a supracondylar fracture.      Electronically signed by: Turner Ladd MD  Date:    11/14/2022  Time:    20:09               Narrative:    EXAMINATION:  CT ARM ELBOW WITHOUT CONTRAST LEFT    CLINICAL HISTORY:  Fracture, elbow;    TECHNIQUE:  Automatic exposure control (AEC) was utilized for dose reduction.    Dose: 441 mGycm    COMPARISON:  None    FINDINGS:  Patient has a  displaced supracondylar fracture.  This is more pronounced on the medial side the proximal radius and ulna appear intact.                                       CT Cervical Spine Without Contrast (Final result)  Result time 11/14/22 20:06:33      Final result by Turner  SHAD Ladd MD (11/14/22 20:06:33)                   Impression:      Degenerative changes and mild malalignment.  No acute fractures are seen      Electronically signed by: Turner Ladd MD  Date:    11/14/2022  Time:    20:06               Narrative:    EXAMINATION:  CT CERVICAL SPINE WITHOUT CONTRAST    CLINICAL HISTORY:  Neck trauma (Age >= 65y);    TECHNIQUE:  Low dose axial images, sagittal and coronal reformations were performed though the cervical spine.  Contrast was not administered.    Automatic exposure control (AEC) was utilized for dose reduction.    Dose: 233 mGycm    COMPARISON:  None    FINDINGS:  There is a mild anterolisthesis of C3 on C4-C4 on C5-C5 on C6 and C6 on C7.  The odontoid is intact.  There are diffuse degenerative changes.  There is no prevertebral edema noted.                                       CT Head Without Contrast (Final result)  Result time 11/14/22 20:05:08      Final result by Turner Ladd MD (11/14/22 20:05:08)                   Impression:      Chronic changes, no acute disease is seen      Electronically signed by: Turner Ladd MD  Date:    11/14/2022  Time:    20:05               Narrative:    EXAMINATION:  CT HEAD WITHOUT CONTRAST    CLINICAL HISTORY:  Head trauma, minor (Age >= 65y);    TECHNIQUE:  Low dose axial images were obtained through the head.  Coronal and sagittal reformations were also performed. Contrast was not administered.    Automatic exposure control (AEC) was utilized for dose reduction.    Dose: 742 mGycm    COMPARISON:  08/31/2022    FINDINGS:  There is atrophy.  There is decreased attenuation in the periventricular white matter most consistent with chronic ischemia.  There is no hemorrhage or extra-axial fluid collections noted.  No masses is seen no acute infarcts are noted.  There is vascular calcification noted.  There are changes consistent with an empty sella syndrome.  The calvarium appears intact.                                        X-Ray Elbow Complete Left (Final result)  Result time 11/14/22 19:29:00      Final result by Gen Middleton MD (11/14/22 19:29:00)                   Narrative:    EXAMINATION  XR ELBOW COMPLETE 3 VIEW LEFT    CLINICAL HISTORY  Pain in left elbow    TECHNIQUE  A total of 3 images submitted of the elbow.    COMPARISON  11 November 2022    FINDINGS  Previously occult nondisplaced supracondylar fracture is now readily apparent, with associated anterior displacement and prominence of the anterior and posterior fat pads secondary to enlarged joint effusion.  The remaining visualized osseous structures are grossly intact.  No joint incongruity is identified.  There is no destructive skeletal lesion.    Periarticular soft tissue swelling is present.    IMPRESSION  Interval displacement of previously occult humeral supracondylar fracture.      Electronically signed by: Gen Middleton  Date:    11/14/2022  Time:    19:29                                     Medications   Tdap (BOOSTRIX) vaccine injection 0.5 mL (0.5 mLs Intramuscular Given 11/15/22 1544)     Medical Decision Making:   Differential Diagnosis:   Fracture, contusion, arthritis  ED Management:  A posterior long arm splint was applied to the left arm by nursing staff. NVI s/p splinting           ED Course as of 11/29/22 0739   Mon Nov 14, 2022 1920 Daughter requesting x-rays of the neck because she is complaining of neck pain.  They are unsure if she hit her head or neck on 11/11 but thinks she may have. [BS]   2041 Dr. Celestin reviewed images and wants to admit for surgery. Wants medicine to be primary [BS]   2056 Hospital medicine okay with admission [BS]      ED Course User Index  [BS] Tyree Hansen MD                   Clinical Impression:   Final diagnoses:  [M25.522] Left elbow pain  [S42.412A] Closed supracondylar fracture of left humerus, initial encounter        ED Disposition Condition    Admit Stable                Tyree Hansen MD  11/14/22  2057       Tyree Hansen MD  11/29/22 0754

## 2022-11-15 NOTE — NURSING
RX: sent to pharmacy  Supplies: sling, cane    Educated on home care, f/u, meds., therapy, complication prevention, when to seek medical attention, ect. See AVS for specifics.     Questions/concerns addressed. Stable and ready for discharge.

## 2022-11-15 NOTE — H&P
[facility] - Orthopaedics  [facility] Medicine  History & Physical    Patient Name: Edith Alvarado  MRN: [id number]  Patient Class: IP- Inpatient  Admission Date: 11/14/2022  Attending Physician:Jared Augustin MD  Primary Care Provider: Tiffany Cameron MD         Patient information was obtained from via telemed    Subjective:     Principal Problem:Closed fracture of left elbow    Chief Complaint:   Chief Complaint   Patient presents with    Arm Pain     Trip and fall 11/11/22 seen here. Today has increased pain left elbow seen at doctor's office today.        HPI:  89-year-old female with hx HTN, Hypothyroidism, CAD presents emergency department for re-evaluation of left elbow pain.  Patient states that she does not remember falling but supposedly per chart review on 11/11 she sustained a trip and fall and landed on her left elbow.  X-rays at that time did not show any fracture.  She seen ortho this morning.  Supposedly she was sitting talking to her family members when she moved her elbow and stated significant pain so came for re-evaluation.  No recent falls other than the one on 11/11. She asked by ortho to be admitted for surgery. Denies F/C, SOB, Chest pain. Left elbow and hand pain, but now better. She had an elbow splint. She also had a skin tear not sure if they gave her a tetanus shot or not      Past Medical History:   Diagnosis Date    Anemia     CAD (coronary artery disease)     Eczema 9/29/2022    HLD (hyperlipidemia)     HTN (hypertension)     Hypertension     Hypothyroid     Leukopenia     Macrocytic anemia     MDS (myelodysplastic syndrome) with 5q deletion     Neutropenia     Other neutropenia 5/18/2022       Past Surgical History:   Procedure Laterality Date    APPENDECTOMY      BLADDER SUSPENSION      BONE MARROW ASPIRATION N/A 07/05/2022    Procedure: ASPIRATION, BONE MARROW;  Surgeon: Reinier Vences MD;  Location: [facility];  Service: General;  Laterality:  N/A;    BONE MARROW BIOPSY  07/05/2022    cataract surgery      CHOLECYSTECTOMY      COLONOSCOPY      CORONARY STENT PLACEMENT      HYSTERECTOMY         Review of patient's allergies indicates:   Allergen Reactions    Lortab [hydrocodone-acetaminophen] Hives    Codeine Rash    Ranexa [ranolazine] Other (See Comments)     Leg Cramps       No current facility-administered medications on file prior to encounter.     Current Outpatient Medications on File Prior to Encounter   Medication Sig    aspirin (ECOTRIN) 81 MG EC tablet Take 81 mg by mouth once daily.    calcium carbonate (TUMS) 200 mg calcium (500 mg) chewable tablet Take 1 tablet by mouth once daily.    cholecalciferol, vitamin D3, (D3-5000 ORAL) TAKE ONE TABLET BY MOUTH EVERY NIGHT AT BEDTIME.    CONSTULOSE 10 gram/15 mL solution Take 15 mLs (10 g total) by mouth every evening. As needed for constipation    ergocalciferol (ERGOCALCIFEROL) 50,000 unit Cap Take 5,000 Units by mouth nightly.    ibuprofen (ADVIL,MOTRIN) 600 MG tablet Take 1 tablet (600 mg total) by mouth every 8 (eight) hours as needed for Pain. Take with food    isosorbide mononitrate (IMDUR) 60 MG 24 hr tablet Take 60 mg by mouth once daily.    lenalidomide 5 mg Cap Take 5 mg by mouth once daily on days 1-21 of each 28 day cycle. [employer] authorization.    levothyroxine (SYNTHROID) 88 MCG tablet Take 1 tablet (88 mcg total) by mouth once daily.    losartan (COZAAR) 100 MG tablet Take 100 mg by mouth once daily.    mupirocin (BACTROBAN) 2 % ointment Apply topically 3 (three) times daily.    pravastatin (PRAVACHOL) 20 MG tablet Take 20 mg by mouth once daily.    triamcinolone acetonide 0.1% (KENALOG) 0.1 % cream Apply topically 2 (two) times daily. for 7 days     Family History       Problem Relation (Age of Onset)    COPD Daughter    Cancer Daughter    Hearing loss Mother    Hypertension Mother    Mental illness Daughter    Vision loss Mother          Tobacco Use    Smoking  status: Never    Smokeless tobacco: Never   Substance and Sexual Activity    Alcohol use: Not Currently    Drug use: Never    Sexual activity: Not Currently     Partners: Female     Review of Systems   Constitutional: Negative.    HENT: Negative.     Eyes: Negative.    Respiratory: Negative.     Cardiovascular: Negative.    Gastrointestinal: Negative.    Endocrine: Negative.    Genitourinary: Negative.    Musculoskeletal:  Positive for arthralgias.        Left Elbow pain   Skin:  Positive for wound.   Allergic/Immunologic: Negative.    Neurological: Negative.    Hematological: Negative.    Psychiatric/Behavioral: Negative.     Objective:     Vital Signs (Most Recent):  Temp: 98.3 °F (36.8 °C) (11/14/22 2245)  Pulse: 77 (11/14/22 2245)  Resp: 20 (11/14/22 2245)  BP: 120/60 (11/14/22 2245)  SpO2: 98 % (11/14/22 2245) Vital Signs (24h Range):  Temp:  [98 °F (36.7 °C)-98.3 °F (36.8 °C)] 98.3 °F (36.8 °C)  Pulse:  [77-88] 77  Resp:  [18-20] 20  SpO2:  [97 %-98 %] 98 %  BP: ()/(48-60) 120/60     Weight: 56.7 kg (125 lb)  Body mass index is 22.86 kg/m².    Physical Exam  Constitutional:       Appearance: Normal appearance. She is normal weight.   HENT:      Head: Normocephalic and atraumatic.      Mouth/Throat:      Mouth: Mucous membranes are dry.   Eyes:      Extraocular Movements: Extraocular movements intact.      Conjunctiva/sclera: Conjunctivae normal.      Pupils: Pupils are equal, round, and reactive to light.   Cardiovascular:      Rate and Rhythm: Normal rate and regular rhythm.      Pulses: Normal pulses.      Heart sounds: Normal heart sounds.   Pulmonary:      Effort: Pulmonary effort is normal.      Breath sounds: Normal breath sounds.   Abdominal:      General: Abdomen is flat. Bowel sounds are normal.      Palpations: Abdomen is soft.   Musculoskeletal:      Cervical back: Normal range of motion.      Comments: Left Elbow Splint in place   Skin:     Comments: Left arm skin tear   Neurological:       General: No focal deficit present.      Mental Status: She is alert and oriented to person, place, and time. Mental status is at baseline.   Psychiatric:         Mood and Affect: Mood normal.         CRANIAL NERVES     CN III, IV, VI   Pupils are equal, round, and reactive to light.     Significant Labs: All pertinent labs within the past 24 hours have been reviewed.  ABGs: No results for input(s): PH, PCO2, HCO3, POCSATURATED, BE, TOTALHB, COHB, METHB, O2HB, POCFIO2, PO2 in the last 48 hours.  Bilirubin:   Recent Labs   Lab 10/24/22  1506 11/07/22  1238 11/14/22 2104   BILITOT 0.4 0.3 0.4     Blood Culture: No results for input(s): LABBLOO in the last 48 hours.  BMP:   Recent Labs   Lab 11/14/22 2104      K 4.0   CO2 21*   BUN 18.8   CREATININE 0.69   CALCIUM 8.8     CBC:   Recent Labs   Lab 11/14/22 2104   WBC 3.8*   HGB 7.5*   HCT 23.1*        CMP:   Recent Labs   Lab 11/14/22 2104      K 4.0   CO2 21*   BUN 18.8   CREATININE 0.69   CALCIUM 8.8   ALBUMIN 3.1*   BILITOT 0.4   ALKPHOS 96   AST 16   ALT 13     Cardiac Markers: No results for input(s): CKMB, MYOGLOBIN, BNP, TROPISTAT in the last 48 hours.  Coagulation:   Recent Labs   Lab 11/14/22 2104   INR 1.13*     Lactic Acid: No results for input(s): LACTATE in the last 48 hours.  Lipase: No results for input(s): LIPASE in the last 48 hours.  Lipid Panel: No results for input(s): CHOL, HDL, LDLCALC, TRIG, CHOLHDL in the last 48 hours.  Magnesium: No results for input(s): MG in the last 48 hours.  Prealbumin: No results for input(s): PREALBUMIN in the last 48 hours.  Respiratory Culture: No results for input(s): GSRESP, RESPIRATORYC in the last 48 hours.  Troponin: No results for input(s): TROPONINI, TROPONINIHS in the last 48 hours.  TSH:   Recent Labs   Lab 08/31/22  1015   TSH 2.3216     Urine Studies: No results for input(s): COLORU, APPEARANCEUA, PHUR, SPECGRAV, PROTEINUA, GLUCUA, KETONESU, BILIRUBINUA, OCCULTUA, NITRITE,  UROBILINOGEN, LEUKOCYTESUR, RBCUA, WBCUA, BACTERIA, SQUAMEPITHEL, HYALINECASTS in the last 48 hours.    Invalid input(s): WRIGHTSUR    Significant Imaging:   Head CT no acute finding  Cervical CT   Degenerative changes and mild malalignment.  No acute fractures are seen  Left Elbow Xray  Interval displacement of previously occult humeral supracondylar fracture.  Left Elbow CT  Changes consistent with a supracondylar fracture.      Assessment/Plan:     * Closed fracture of left elbow  Pain under control, Dr. Celestin to see in AM. NPO after MN      Macrocytic anemia  Check TSH, Vitamin B12, Folate, iron studies, if needed transfuse if Hgb <7      Primary hypertension  Hold BP med avoid perioperative hypotension      Acquired hypothyroidism  Continue Synthroid        VTE Risk Mitigation (From admission, onward)         Ordered     IP VTE HIGH RISK PATIENT  Once         11/14/22 2056     Place sequential compression device  Until discontinued         11/14/22 2056            DVT ppx on SCD  Pt is Full code, SDM is her son Gabe Alvarado  Pt is seen and examined via telemed. Pt is in [facility]. I am in [place], [place]. Nursing staff assisted with evaluation of the patient. Software is Audio/ [employer]  Pt is being admitted as an inpatient to the hospitalist service for further eval and treatment         Jared Augustin MD  Department of Hospital Medicine   [facility] - Orthopaedics

## 2022-11-15 NOTE — PROGRESS NOTES
[facility] ORTHOPAEDIC  [facility] Medicine Progress Note      Patient Name: Edith Alvarado  MRN: [id number]  Admission Date: 11/14/2022   Length of Stay: 1  Attending Physician: Francisco Fierro MD  Primary Care Provider: Tiffany Cameron MD  Face-to-Face encounter date: 11/15/2022    Code Status: Full Code        Chief Complaint:   Arm Pain (Trip and fall 11/11/22 seen here. Today has increased pain left elbow seen at doctor's office today.)        HPI:    89-year-old female with hx HTN, Hypothyroidism, CAD presents emergency department for re-evaluation of left elbow pain.  Patient states that she does not remember falling but supposedly per chart review on 11/11 she sustained a trip and fall and landed on her left elbow.  X-rays at that time did not show any fracture.  She seen ortho this morning.  Supposedly she was sitting talking to her family members when she moved her elbow and stated significant pain so came for re-evaluation.  No recent falls other than the one on 11/11. She asked by ortho to be admitted for surgery. Denies F/C, SOB, Chest pain. Left elbow and hand pain, but now better. She had an elbow splint. She also had a skin tear not sure if they gave her a tetanus shot or not     Overview/Hospital Course:  No notes on file       Interval Hx:   Ortho rec no surgery, sling, pt refusing blood transfusion, has mds and has been getting transfusions q2wks.  Spoke with palliative care and does not want any more transfusions.      Review of Systems   All other systems reviewed and are negative.    Objective/physical exam:  General: In no acute distress, afebrile  Chest: Clear to auscultation bilaterally  Heart: RRR, +S1, S2, no appreciable murmur  Abdomen: Soft, nontender, BS +  MSK: left arm sling in place  Neurologic: Alert and oriented x4, Cranial nerve II-XII intact,      VITAL SIGNS: 24 HRS MIN & MAX LAST   Temp  Min: 98 °F (36.7 °C)  Max: 99.9 °F (37.7 °C)  98.4 °F (36.9 °C)   BP  Min: 91/54  Max: 125/71 (!) 101/56     Pulse  Min: 77  Max: 88  88   Resp  Min: 18  Max: 20 18   SpO2  Min: 95 %  Max: 98 % 95 %       Recent Labs   Lab 11/14/22  2104 11/15/22  0526   WBC 3.8* 3.7*   RBC 2.25* 2.18*   HGB 7.5* 7.2*   HCT 23.1* 22.3*   .7* 102.3*   MCH 33.3* 33.0*   MCHC 32.5* 32.3*   RDW 20.0* 19.9*    202   MPV 13.5* 13.4*       Recent Labs   Lab 11/14/22  2104 11/15/22  0526    137   K 4.0 4.0   CO2 21* 21*   BUN 18.8 16.2   CREATININE 0.69 0.68   CALCIUM 8.8 8.7   ALBUMIN 3.1* 3.0*   ALKPHOS 96 105   ALT 13 14   AST 16 17   BILITOT 0.4 0.4        Microbiology Results (last 7 days)       ** No results found for the last 168 hours. **             Radiology:  CT Arm Elbow Without Contrast Left  Narrative: EXAMINATION:  CT ARM ELBOW WITHOUT CONTRAST LEFT    CLINICAL HISTORY:  Fracture, elbow;    TECHNIQUE:  Automatic exposure control (AEC) was utilized for dose reduction.    Dose: 441 mGycm    COMPARISON:  None    FINDINGS:  Patient has a  displaced supracondylar fracture.  This is more pronounced on the medial side the proximal radius and ulna appear intact.  Impression: Changes consistent with a supracondylar fracture.    Electronically signed by: Turner Ladd MD  Date:    11/14/2022  Time:    20:09  CT Cervical Spine Without Contrast  Narrative: EXAMINATION:  CT CERVICAL SPINE WITHOUT CONTRAST    CLINICAL HISTORY:  Neck trauma (Age >= 65y);    TECHNIQUE:  Low dose axial images, sagittal and coronal reformations were performed though the cervical spine.  Contrast was not administered.    Automatic exposure control (AEC) was utilized for dose reduction.    Dose: 233 mGycm    COMPARISON:  None    FINDINGS:  There is a mild anterolisthesis of C3 on C4-C4 on C5-C5 on C6 and C6 on C7.  The odontoid is intact.  There are diffuse degenerative changes.  There is no prevertebral edema noted.  Impression: Degenerative changes and mild malalignment.  No acute  fractures are seen    Electronically signed by: Turner Ladd MD  Date:    11/14/2022  Time:    20:06  CT Head Without Contrast  Narrative: EXAMINATION:  CT HEAD WITHOUT CONTRAST    CLINICAL HISTORY:  Head trauma, minor (Age >= 65y);    TECHNIQUE:  Low dose axial images were obtained through the head.  Coronal and sagittal reformations were also performed. Contrast was not administered.    Automatic exposure control (AEC) was utilized for dose reduction.    Dose: 742 mGycm    COMPARISON:  08/31/2022    FINDINGS:  There is atrophy.  There is decreased attenuation in the periventricular white matter most consistent with chronic ischemia.  There is no hemorrhage or extra-axial fluid collections noted.  No masses is seen no acute infarcts are noted.  There is vascular calcification noted.  There are changes consistent with an empty sella syndrome.  The calvarium appears intact.  Impression: Chronic changes, no acute disease is seen    Electronically signed by: Turner Ladd MD  Date:    11/14/2022  Time:    20:05  X-Ray Elbow Complete Left  EXAMINATION  XR ELBOW COMPLETE 3 VIEW LEFT    CLINICAL HISTORY  Pain in left elbow    TECHNIQUE  A total of 3 images submitted of the elbow.    COMPARISON  11 November 2022    FINDINGS  Previously occult nondisplaced supracondylar fracture is now readily apparent, with associated anterior displacement and prominence of the anterior and posterior fat pads secondary to enlarged joint effusion.  The remaining visualized osseous structures are grossly intact.  No joint incongruity is identified.  There is no destructive skeletal lesion.    Periarticular soft tissue swelling is present.    IMPRESSION  Interval displacement of previously occult humeral supracondylar fracture.    Electronically signed by: Gen Middleton  Date:    11/14/2022  Time:    19:29      Scheduled Med:   acetaminophen  500 mg Oral 6 times per day    aspirin  81 mg Oral Daily    isosorbide mononitrate  60 mg Oral Daily     levothyroxine  88 mcg Oral Daily        Continuous Infusions:       PRN Meds:  sodium chloride 0.9%, Tdap, tramadol     Nutrition Status:      Assessment/Plan:  Closed fracture of left elbow  Anemia  Htn  Hypothyroid    Plan    Trying tramadol  If tolerates will dc home    Gi proph: pepcid  Dvt proph: scd        All diagnosis and differential diagnosis have been reviewed; assessment and plan has been documented; I have personally reviewed the labs and test results that are presently available; I have reviewed the patients medication list; I have reviewed the consulting providers response and recommendations. I have reviewed or attempted to review medical records based upon their availability      _____________________________________________________________________            Francisco Fierro MD   11/15/2022

## 2022-12-05 PROBLEM — N39.0 UTI (URINARY TRACT INFECTION): Status: RESOLVED | Noted: 2022-01-01 | Resolved: 2022-01-01

## 2023-01-12 ENCOUNTER — TELEPHONE (OUTPATIENT)
Dept: HEMATOLOGY/ONCOLOGY | Facility: CLINIC | Age: 88
End: 2023-01-12
Payer: MEDICARE

## 2024-06-18 NOTE — TELEPHONE ENCOUNTER
Brevado auth called in to [facility]   Well developed, in no acute distress, normal communication ability

## (undated) DEVICE — DRAPE HAND STERILE

## (undated) DEVICE — DRAPE STERI U-SHAPED 47X51IN

## (undated) DEVICE — COVER MAYO STAND REINFRCD 30

## (undated) DEVICE — COVER EQUIPMENT 36X25

## (undated) DEVICE — GLOVE PROTEXIS HYDROGEL SZ8

## (undated) DEVICE — APPLICATOR CHLORAPREP ORN 26ML

## (undated) DEVICE — GLOVE PROTEXIS NEU-THERA SZ6

## (undated) DEVICE — SUT VICRYL 3-0 27 SH

## (undated) DEVICE — SOL NACL IRR 1000ML BTL

## (undated) DEVICE — COVER TABLE HVY DTY 60X90IN

## (undated) DEVICE — GLOVE PROTEXIS LTX MICRO 8

## (undated) DEVICE — CAST PLSTR SPLINT X-FAST 3X15

## (undated) DEVICE — Device

## (undated) DEVICE — PAD ABDOMINAL 5X9 STERILE

## (undated) DEVICE — SUT MONOCRYL 3-0 PS-2 UND

## (undated) DEVICE — GLOVE PROTEXIS HYDROGEL SZ6.5

## (undated) DEVICE — DRAPE U-DRAPE ADHESIVE 60X60IN

## (undated) DEVICE — DRAPE INCISE IOBAN 2 23X23IN

## (undated) DEVICE — DRESSING XEROFORM FOIL PK 1X8

## (undated) DEVICE — STOCKINETTE IMPERVIOUS LARGE

## (undated) DEVICE — SPLINT PLASTER EXT FAST 4X15

## (undated) DEVICE — GAUZE SPONGE 4X4 12PLY

## (undated) DEVICE — GOWN POLY REINF X-LONG 2XL

## (undated) DEVICE — KIT SURGICAL TURNOVER

## (undated) DEVICE — SPONGE LAP STRL 18X18IN

## (undated) DEVICE — ELECTRODE PATIENT RETURN DISP

## (undated) DEVICE — PEROXIDE HYDROGEN 3% 16OZ

## (undated) DEVICE — DRAPE C-ARMOR EQUIPMENT COVER

## (undated) DEVICE — DRAPE FULL SHEET 70X100IN